# Patient Record
Sex: MALE | Race: WHITE | NOT HISPANIC OR LATINO | Employment: FULL TIME | URBAN - METROPOLITAN AREA
[De-identification: names, ages, dates, MRNs, and addresses within clinical notes are randomized per-mention and may not be internally consistent; named-entity substitution may affect disease eponyms.]

---

## 2017-01-30 RX ORDER — BUMETANIDE 1 MG/1
1 TABLET ORAL DAILY
Qty: 30 TABLET | Refills: 3 | Status: SHIPPED | OUTPATIENT
Start: 2017-01-30 | End: 2018-02-01 | Stop reason: ALTCHOICE

## 2017-05-24 ENCOUNTER — APPOINTMENT (OUTPATIENT)
Dept: CARDIOLOGY | Facility: HOSPITAL | Age: 52
End: 2017-05-24
Attending: INTERNAL MEDICINE

## 2017-06-28 ENCOUNTER — APPOINTMENT (OUTPATIENT)
Dept: CARDIOLOGY | Facility: HOSPITAL | Age: 52
End: 2017-06-28
Attending: INTERNAL MEDICINE

## 2018-02-01 ENCOUNTER — ANTICOAGULATION VISIT (OUTPATIENT)
Dept: CARDIOLOGY | Facility: CLINIC | Age: 53
End: 2018-02-01

## 2018-02-01 ENCOUNTER — OFFICE VISIT (OUTPATIENT)
Dept: CARDIOLOGY | Facility: CLINIC | Age: 53
End: 2018-02-01

## 2018-02-01 ENCOUNTER — HOSPITAL ENCOUNTER (OUTPATIENT)
Dept: CARDIOLOGY | Facility: HOSPITAL | Age: 53
Discharge: HOME | End: 2018-02-01
Admitting: INTERNAL MEDICINE

## 2018-02-01 VITALS
WEIGHT: 315 LBS | BODY MASS INDEX: 40.05 KG/M2 | HEART RATE: 59 BPM | DIASTOLIC BLOOD PRESSURE: 83 MMHG | SYSTOLIC BLOOD PRESSURE: 120 MMHG

## 2018-02-01 DIAGNOSIS — E78.5 HYPERLIPIDEMIA, UNSPECIFIED HYPERLIPIDEMIA TYPE: ICD-10-CM

## 2018-02-01 DIAGNOSIS — I50.9 CONGESTIVE HEART FAILURE, UNSPECIFIED CONGESTIVE HEART FAILURE CHRONICITY, UNSPECIFIED CONGESTIVE HEART FAILURE TYPE: ICD-10-CM

## 2018-02-01 DIAGNOSIS — I48.0 PAROXYSMAL ATRIAL FIBRILLATION (HCC): Primary | ICD-10-CM

## 2018-02-01 LAB — INR PPP: 1.6

## 2018-02-01 PROCEDURE — 93000 ELECTROCARDIOGRAM COMPLETE: CPT | Performed by: INTERNAL MEDICINE

## 2018-02-01 PROCEDURE — 85610 PROTHROMBIN TIME: CPT | Performed by: INTERNAL MEDICINE

## 2018-02-01 PROCEDURE — 99213 OFFICE O/P EST LOW 20 MIN: CPT | Performed by: INTERNAL MEDICINE

## 2018-02-01 NOTE — PROGRESS NOTES
Subjective:       Marcus Simon is a 52 y.o. male who here for follow up    CC  Follow-up for atrial Coblation congestive heart failure and hyperlipidemia  HPI  52-year-old male with known history of paroxysmal atrial fibrillation, congestive heart failure hyperlipidemia here for the follow-up with no complaints of chest pains or tightness in chest no heaviness with a pressure sensation     Problem List Items Addressed This Visit        Cardiovascular and Mediastinum    Paroxysmal atrial fibrillation - Primary    Relevant Orders    ECG 12 Lead    Adult Transthoracic Echo Complete W/ Cont if Necessary Per Protocol    Protime-INR    Congestive heart failure    Relevant Orders    Adult Transthoracic Echo Complete W/ Cont if Necessary Per Protocol    Protime-INR    Hyperlipidemia    Relevant Orders    Adult Transthoracic Echo Complete W/ Cont if Necessary Per Protocol    Protime-INR        .    The following portions of the patient's history were reviewed and updated as appropriate: allergies, current medications, past family history, past medical history, past social history, past surgical history and problem list.    Past Medical History:   Diagnosis Date   • Bulging disc    • CAD (coronary artery disease)    • CHF (congestive heart failure)    • DDD (degenerative disc disease)    • HLD (hyperlipidemia)    • HTN (hypertension)    • Hypothyroidism    • MI (myocardial infarction)    • Osteoarthritis    • PAF (paroxysmal atrial fibrillation)    • Peptic ulcer    • SOB (shortness of breath)    • Transient cerebral ischemia     reports that he has never smoked. He has never used smokeless tobacco. He reports that he does not drink alcohol or use illicit drugs.  Family History   Problem Relation Age of Onset   • Heart attack Mother    • No Known Problems Father    • No Known Problems Sister    • No Known Problems Brother    • No Known Problems Maternal Aunt    • No Known Problems Maternal Uncle    • No Known Problems  Paternal Aunt    • No Known Problems Paternal Uncle    • No Known Problems Maternal Grandmother    • No Known Problems Maternal Grandfather    • No Known Problems Paternal Grandmother    • No Known Problems Paternal Grandfather        Review of Systems  Constitutional: No wt loss, fever, fatigue  Gastrointestinal: No nausea, abdominal pain  Behavioral/Psych: No insomnia or anxiety   Cardiovascular No chest pains or tightness in chest  Objective:       Physical Exam           Physical Exam  /83  Pulse 59  Wt (!) 149 kg (329 lb)  BMI 40.05 kg/m2    General appearance: NAD, conversant   Eyes: anicteric sclerae, moist conjunctivae; no lid-lag; PERRLA   HENT: Atraumatic; oropharynx clear with moist mucous membranes and no mucosal ulcerations;  normal hard and soft palate   Neck: Trachea midline; FROM, supple, no thyromegaly or lymphadenopathy   Lungs: CTA, with normal respiratory effort and no intercostal retractions   CV: S1-S2 regular, no murmurs, no rub, no gallop   Abdomen: Soft, non-tender; no masses or HSM   Extremities: No peripheral edema or extremity lymphadenopathy  Skin: Normal temperature, turgor and texture; no rash, ulcers or subcutaneous nodules   Psych: Appropriate affect, alert and oriented to person, place and time           Cardiographics  @  ECG 12 Lead  Date/Time: 2/1/2018 1:03 PM  Performed by: GONZALES ALONSO  Authorized by: GONZALES ALONSO   Comparison: compared with previous ECG   Similar to previous ECG  Rhythm: atrial fibrillation  ST Flattening: all  Clinical impression: abnormal ECG            Echocardiogram:        Current Outpatient Prescriptions:   •  amLODIPine-benazepril (LOTREL) 10-20 MG per capsule, Take 1 capsule by mouth Daily., Disp: , Rfl:   •  baclofen (LIORESAL) 10 MG tablet, Take 10 mg by mouth 3 (Three) Times a Day., Disp: , Rfl:   •  colchicine 0.6 MG tablet, Take 0.6 mg by mouth Daily., Disp: , Rfl:   •  Furosemide (LASIX PO), Take  by mouth., Disp: ,  Rfl:   •  levothyroxine (SYNTHROID, LEVOTHROID) 150 MCG tablet, Take 150 mcg by mouth Daily., Disp: , Rfl:   •  metoprolol succinate XL (TOPROL-XL) 100 MG 24 hr tablet, Take 100 mg by mouth Daily., Disp: , Rfl:   •  sildenafil (VIAGRA) 100 MG tablet, Take 100 mg by mouth As Needed for erectile dysfunction., Disp: , Rfl:   •  warfarin (COUMADIN) 5 MG tablet, Take 5 mg by mouth Daily., Disp: , Rfl:    Assessment:        Patient Active Problem List   Diagnosis   • Dyspnea on exertion   • Paroxysmal atrial fibrillation   • Congestive heart failure   • Cardiomyopathy   • Hypertension   • Spinal stenosis of lumbar region   • Morbid (severe) obesity due to excess calories   • Old myocardial infarction   • Osteoarthritis of lumbar spine with myelopathy   • Hypothyroidism   • Hyperlipidemia   • Chronic atrial fibrillation   • Long term current use of anticoagulant   • Coronary artery disease involving native coronary artery of native heart without angina pectoris               Plan:            ICD-10-CM ICD-9-CM   1. Paroxysmal atrial fibrillation I48.0 427.31   2. Hyperlipidemia, unspecified hyperlipidemia type E78.5 272.4   3. Congestive heart failure, unspecified congestive heart failure chronicity, unspecified congestive heart failure type I50.9 428.0     1. Paroxysmal atrial fibrillation  Considering patient's medical condition as well as the risk factors, patient will require echocardiogram for further evaluation for the LV function, four-chamber evaluation, including the pressures, valvular function and  pericardial disease and pericardial effusion    - ECG 12 Lead  - Adult Transthoracic Echo Complete W/ Cont if Necessary Per Protocol; Future  - Protime-INR    2. Hyperlipidemia, unspecified hyperlipidemia type  Risk of the hyperlipidemia, importance of the treatment has been explained    Pros and cons of the statins has been explained    Regular blood workup as well as side effects including the liver failure,  myelopathy death has been explained        - Adult Transthoracic Echo Complete W/ Cont if Necessary Per Protocol; Future  - Protime-INR    3. Congestive heart failure, unspecified congestive heart failure chronicity, unspecified congestive heart failure type  Compensated  - Adult Transthoracic Echo Complete W/ Cont if Necessary Per Protocol; Future  - Protime-INR  1 YR WITH ECHO  COUNSELING:    Marcus Browneling was given to patient for the following topics: diagnostic results, risk factor reductions, impressions, risks and benefits of treatment options and importance of treatment compliance .       SMOKING COUNSELING:    Counseling given: Not Answered      EMR Dragon/Transcription disclaimer:   Much of this encounter note is an electronic transcription/translation of spoken language to printed text. The electronic translation of spoken language may permit erroneous, or at times, nonsensical words or phrases to be inadvertently transcribed; Although I have reviewed the note for such errors, some may still exist.

## 2018-11-02 DIAGNOSIS — I50.9 CONGESTIVE HEART FAILURE, UNSPECIFIED HF CHRONICITY, UNSPECIFIED HEART FAILURE TYPE (HCC): ICD-10-CM

## 2018-11-02 DIAGNOSIS — I48.91 ATRIAL FIBRILLATION, UNSPECIFIED TYPE (HCC): Primary | ICD-10-CM

## 2019-02-07 ENCOUNTER — HOSPITAL ENCOUNTER (OUTPATIENT)
Dept: CARDIOLOGY | Facility: HOSPITAL | Age: 54
Discharge: HOME | End: 2019-02-07
Attending: INTERNAL MEDICINE | Admitting: INTERNAL MEDICINE

## 2019-02-07 ENCOUNTER — OFFICE VISIT (OUTPATIENT)
Dept: CARDIOLOGY | Facility: CLINIC | Age: 54
End: 2019-02-07

## 2019-02-07 VITALS
BODY MASS INDEX: 38.36 KG/M2 | WEIGHT: 315 LBS | HEIGHT: 76 IN | SYSTOLIC BLOOD PRESSURE: 146 MMHG | HEART RATE: 91 BPM | DIASTOLIC BLOOD PRESSURE: 89 MMHG

## 2019-02-07 VITALS
WEIGHT: 315 LBS | BODY MASS INDEX: 38.36 KG/M2 | HEIGHT: 76 IN | HEART RATE: 84 BPM | SYSTOLIC BLOOD PRESSURE: 187 MMHG | DIASTOLIC BLOOD PRESSURE: 80 MMHG

## 2019-02-07 DIAGNOSIS — I25.10 CORONARY ARTERY DISEASE INVOLVING NATIVE CORONARY ARTERY OF NATIVE HEART WITHOUT ANGINA PECTORIS: ICD-10-CM

## 2019-02-07 DIAGNOSIS — I48.0 PAROXYSMAL ATRIAL FIBRILLATION (HCC): Primary | ICD-10-CM

## 2019-02-07 DIAGNOSIS — I10 ESSENTIAL HYPERTENSION: ICD-10-CM

## 2019-02-07 LAB
BH CV ECHO MEAS - ACS: 2.4 CM
BH CV ECHO MEAS - AO MAX PG (FULL): 2.3 MMHG
BH CV ECHO MEAS - AO MAX PG: 5 MMHG
BH CV ECHO MEAS - AO MEAN PG (FULL): 1 MMHG
BH CV ECHO MEAS - AO MEAN PG: 3 MMHG
BH CV ECHO MEAS - AO ROOT AREA (BSA CORRECTED): 1.2
BH CV ECHO MEAS - AO ROOT AREA: 8 CM^2
BH CV ECHO MEAS - AO ROOT DIAM: 3.2 CM
BH CV ECHO MEAS - AO V2 MAX: 112 CM/SEC
BH CV ECHO MEAS - AO V2 MEAN: 81.4 CM/SEC
BH CV ECHO MEAS - AO V2 VTI: 19 CM
BH CV ECHO MEAS - AVA(I,A): 3.9 CM^2
BH CV ECHO MEAS - AVA(I,D): 3.9 CM^2
BH CV ECHO MEAS - AVA(V,A): 3.6 CM^2
BH CV ECHO MEAS - AVA(V,D): 3.6 CM^2
BH CV ECHO MEAS - BSA(HAYCOCK): 2.9 M^2
BH CV ECHO MEAS - BSA: 2.7 M^2
BH CV ECHO MEAS - BZI_BMI: 40 KILOGRAMS/M^2
BH CV ECHO MEAS - BZI_METRIC_HEIGHT: 193 CM
BH CV ECHO MEAS - BZI_METRIC_WEIGHT: 149.2 KG
BH CV ECHO MEAS - EDV(CUBED): 117.6 ML
BH CV ECHO MEAS - EDV(MOD-SP2): 144 ML
BH CV ECHO MEAS - EDV(MOD-SP4): 173 ML
BH CV ECHO MEAS - EDV(TEICH): 112.8 ML
BH CV ECHO MEAS - EF(CUBED): 56.9 %
BH CV ECHO MEAS - EF(MOD-BP): 40 %
BH CV ECHO MEAS - EF(MOD-SP2): 48.6 %
BH CV ECHO MEAS - EF(MOD-SP4): 45.1 %
BH CV ECHO MEAS - EF(TEICH): 48.5 %
BH CV ECHO MEAS - ESV(CUBED): 50.7 ML
BH CV ECHO MEAS - ESV(MOD-SP2): 74 ML
BH CV ECHO MEAS - ESV(MOD-SP4): 95 ML
BH CV ECHO MEAS - ESV(TEICH): 58.1 ML
BH CV ECHO MEAS - FS: 24.5 %
BH CV ECHO MEAS - IVS/LVPW: 0.79
BH CV ECHO MEAS - IVSD: 1.1 CM
BH CV ECHO MEAS - LA DIMENSION: 5.6 CM
BH CV ECHO MEAS - LA/AO: 1.8
BH CV ECHO MEAS - LAT PEAK E' VEL: 16 CM/SEC
BH CV ECHO MEAS - LV DIASTOLIC VOL/BSA (35-75): 63.2 ML/M^2
BH CV ECHO MEAS - LV MASS(C)D: 239.9 GRAMS
BH CV ECHO MEAS - LV MASS(C)DI: 87.6 GRAMS/M^2
BH CV ECHO MEAS - LV MAX PG: 2.7 MMHG
BH CV ECHO MEAS - LV MEAN PG: 2 MMHG
BH CV ECHO MEAS - LV SYSTOLIC VOL/BSA (12-30): 34.7 ML/M^2
BH CV ECHO MEAS - LV V1 MAX: 82.8 CM/SEC
BH CV ECHO MEAS - LV V1 MEAN: 56.3 CM/SEC
BH CV ECHO MEAS - LV V1 VTI: 15 CM
BH CV ECHO MEAS - LVIDD: 4.9 CM
BH CV ECHO MEAS - LVIDS: 3.7 CM
BH CV ECHO MEAS - LVLD AP2: 8.9 CM
BH CV ECHO MEAS - LVLD AP4: 9.4 CM
BH CV ECHO MEAS - LVLS AP2: 7.7 CM
BH CV ECHO MEAS - LVLS AP4: 8.4 CM
BH CV ECHO MEAS - LVOT AREA (M): 4.9 CM^2
BH CV ECHO MEAS - LVOT AREA: 4.9 CM^2
BH CV ECHO MEAS - LVOT DIAM: 2.5 CM
BH CV ECHO MEAS - LVPWD: 1.4 CM
BH CV ECHO MEAS - MED PEAK E' VEL: 8.4 CM/SEC
BH CV ECHO MEAS - MR MAX PG: 55.1 MMHG
BH CV ECHO MEAS - MR MAX VEL: 371 CM/SEC
BH CV ECHO MEAS - MV DEC SLOPE: 522 CM/SEC^2
BH CV ECHO MEAS - MV DEC TIME: 0.17 SEC
BH CV ECHO MEAS - MV E MAX VEL: 89.2 CM/SEC
BH CV ECHO MEAS - MV MAX PG: 5.6 MMHG
BH CV ECHO MEAS - MV MEAN PG: 2 MMHG
BH CV ECHO MEAS - MV P1/2T MAX VEL: 113 CM/SEC
BH CV ECHO MEAS - MV P1/2T: 63.4 MSEC
BH CV ECHO MEAS - MV V2 MAX: 118 CM/SEC
BH CV ECHO MEAS - MV V2 MEAN: 56.2 CM/SEC
BH CV ECHO MEAS - MV V2 VTI: 27.4 CM
BH CV ECHO MEAS - MVA P1/2T LCG: 1.9 CM^2
BH CV ECHO MEAS - MVA(P1/2T): 3.5 CM^2
BH CV ECHO MEAS - MVA(VTI): 2.7 CM^2
BH CV ECHO MEAS - PA ACC TIME: 0.09 SEC
BH CV ECHO MEAS - PA MAX PG (FULL): 1.2 MMHG
BH CV ECHO MEAS - PA MAX PG: 2.8 MMHG
BH CV ECHO MEAS - PA PR(ACCEL): 37.6 MMHG
BH CV ECHO MEAS - PA V2 MAX: 83.3 CM/SEC
BH CV ECHO MEAS - PVA(V,A): 3.2 CM^2
BH CV ECHO MEAS - PVA(V,D): 3.2 CM^2
BH CV ECHO MEAS - QP/QS: 0.77
BH CV ECHO MEAS - RAP SYSTOLE: 15 MMHG
BH CV ECHO MEAS - RV MAX PG: 1.6 MMHG
BH CV ECHO MEAS - RV MEAN PG: 1 MMHG
BH CV ECHO MEAS - RV V1 MAX: 63.5 CM/SEC
BH CV ECHO MEAS - RV V1 MEAN: 43 CM/SEC
BH CV ECHO MEAS - RV V1 VTI: 13.7 CM
BH CV ECHO MEAS - RVDD: 2.8 CM
BH CV ECHO MEAS - RVOT AREA: 4.2 CM^2
BH CV ECHO MEAS - RVOT DIAM: 2.3 CM
BH CV ECHO MEAS - RVSP: 40 MMHG
BH CV ECHO MEAS - SI(AO): 55.8 ML/M^2
BH CV ECHO MEAS - SI(CUBED): 24.5 ML/M^2
BH CV ECHO MEAS - SI(LVOT): 26.9 ML/M^2
BH CV ECHO MEAS - SI(MOD-SP2): 25.6 ML/M^2
BH CV ECHO MEAS - SI(MOD-SP4): 28.5 ML/M^2
BH CV ECHO MEAS - SI(TEICH): 20 ML/M^2
BH CV ECHO MEAS - SV(AO): 152.8 ML
BH CV ECHO MEAS - SV(CUBED): 67 ML
BH CV ECHO MEAS - SV(LVOT): 73.6 ML
BH CV ECHO MEAS - SV(MOD-SP2): 70 ML
BH CV ECHO MEAS - SV(MOD-SP4): 78 ML
BH CV ECHO MEAS - SV(RVOT): 56.9 ML
BH CV ECHO MEAS - SV(TEICH): 54.7 ML
BH CV ECHO MEAS - TAPSE (>1.6): 1.5 CM2
BH CV ECHO MEAS - TR MAX VEL: 250 CM/SEC
BH CV ECHO MEASUREMENTS AVERAGE E/E' RATIO: 7.31
BH CV XLRA - RV BASE: 3.9 CM
BH CV XLRA - RV LENGTH: 8 CM
BH CV XLRA - RV MID: 3.7 CM
BH CV XLRA - TDI S': 9.7 CM/SEC
LEFT ATRIUM VOLUME INDEX: 36 ML/M2
MAXIMAL PREDICTED HEART RATE: 168 BPM
STRESS TARGET HR: 143 BPM

## 2019-02-07 PROCEDURE — 93306 TTE W/DOPPLER COMPLETE: CPT

## 2019-02-07 PROCEDURE — 93306 TTE W/DOPPLER COMPLETE: CPT | Performed by: INTERNAL MEDICINE

## 2019-02-07 PROCEDURE — 99214 OFFICE O/P EST MOD 30 MIN: CPT | Performed by: INTERNAL MEDICINE

## 2019-02-07 PROCEDURE — 25010000002 PERFLUTREN (DEFINITY) 8.476 MG IN SODIUM CHLORIDE 0.9 % 10 ML INJECTION: Performed by: INTERNAL MEDICINE

## 2019-02-07 PROCEDURE — 93000 ELECTROCARDIOGRAM COMPLETE: CPT | Performed by: INTERNAL MEDICINE

## 2019-02-07 RX ORDER — FUROSEMIDE 20 MG/1
10 TABLET ORAL DAILY
Qty: 30 TABLET | Refills: 6 | Status: SHIPPED | OUTPATIENT
Start: 2019-02-07

## 2019-02-07 RX ADMIN — SODIUM CHLORIDE 2.5 ML: 9 INJECTION INTRAMUSCULAR; INTRAVENOUS; SUBCUTANEOUS at 11:39

## 2019-02-07 NOTE — PROGRESS NOTES
Subjective:        Marcus Simon is a 53 y.o. male who here for follow up    CC  INCREASE WT GAIN    LEG SWELLING  HPI  53-year-old male with known history of the paroxysmal atrial fibrillation, benign essential arterial hypertension and coronary artery disease here for the follow-up has been complaining of increasing weight gain as well as increasing leg swellings     Problem List Items Addressed This Visit        Cardiovascular and Mediastinum    Paroxysmal atrial fibrillation (CMS/HCC) - Primary    Relevant Orders    ECG 12 Lead    Hypertension    Relevant Medications    furosemide (LASIX) 20 MG tablet    Coronary artery disease involving native coronary artery of native heart without angina pectoris        .    The following portions of the patient's history were reviewed and updated as appropriate: allergies, current medications, past family history, past medical history, past social history, past surgical history and problem list.    Past Medical History:   Diagnosis Date   • Bulging disc    • CAD (coronary artery disease)    • CHF (congestive heart failure) (CMS/HCC)    • DDD (degenerative disc disease)    • HLD (hyperlipidemia)    • HTN (hypertension)    • Hypothyroidism    • MI (myocardial infarction) (CMS/HCC)    • Osteoarthritis    • PAF (paroxysmal atrial fibrillation) (CMS/HCC)    • Peptic ulcer    • SOB (shortness of breath)    • Transient cerebral ischemia      reports that  has never smoked. he has never used smokeless tobacco. He reports that he does not drink alcohol or use drugs.   Family History   Problem Relation Age of Onset   • Heart attack Mother    • No Known Problems Father    • No Known Problems Sister    • No Known Problems Brother    • No Known Problems Maternal Aunt    • No Known Problems Maternal Uncle    • No Known Problems Paternal Aunt    • No Known Problems Paternal Uncle    • No Known Problems Maternal Grandmother    • No Known Problems Maternal Grandfather    • No Known  "Problems Paternal Grandmother    • No Known Problems Paternal Grandfather        Review of Systems  Constitutional: No wt loss, fever, fatigue  Gastrointestinal: No nausea, abdominal pain  Behavioral/Psych: No insomnia or anxiety   Cardiovascular increasing weight gain and swelling  Objective:   .BP (!) 187/80   Pulse 84   Ht 193 cm (76\")   Wt (!) 166 kg (366 lb)   BMI 44.55 kg/m²   General appearance: No acute changes   Neck: Trachea midline; NECK, supple, no thyromegaly or lymphadenopathy   Lungs: Normal size and shape, normal breath sounds, equal distribution of air, no rales and rhonchi   CV: S1-S2 regular, no murmurs, no rub, no gallop   Abdomen: Soft, non-tender; no masses , no abnormal abdominal sounds   Extremities: No deformity , normal color , 2+ peripheral edema   Skin: Normal temperature, turgor and texture; no rash, ulcers            ECG 12 Lead  Date/Time: 2/7/2019 12:46 PM  Performed by: Ron Cowan MD  Authorized by: Ron Cowan MD   Comparison: compared with previous ECG   Similar to previous ECG  Rhythm: atrial fibrillation  ST Flattening: all  Clinical impression: abnormal ECG              Echocardiogram:        Current Outpatient Medications:   •  amLODIPine-benazepril (LOTREL) 10-20 MG per capsule, Take 1 capsule by mouth Daily., Disp: , Rfl:   •  baclofen (LIORESAL) 10 MG tablet, Take 10 mg by mouth 3 (Three) Times a Day., Disp: , Rfl:   •  colchicine 0.6 MG tablet, Take 0.6 mg by mouth Daily., Disp: , Rfl:   •  Furosemide (LASIX PO), Take 10 mg by mouth Daily., Disp: , Rfl:   •  levothyroxine (SYNTHROID, LEVOTHROID) 150 MCG tablet, Take 150 mcg by mouth Daily., Disp: , Rfl:   •  metoprolol succinate XL (TOPROL-XL) 100 MG 24 hr tablet, Take 100 mg by mouth Daily., Disp: , Rfl:   •  warfarin (COUMADIN) 5 MG tablet, Take 5 mg by mouth Daily., Disp: , Rfl:   •  sildenafil (VIAGRA) 100 MG tablet, Take 100 mg by mouth As Needed for erectile dysfunction., Disp: , Rfl:   No " current facility-administered medications for this visit.    Assessment:        Patient Active Problem List   Diagnosis   • Dyspnea on exertion   • Paroxysmal atrial fibrillation (CMS/HCC)   • Congestive heart failure (CMS/HCC)   • Cardiomyopathy (CMS/HCC)   • Hypertension   • Spinal stenosis of lumbar region   • Morbid (severe) obesity due to excess calories (CMS/HCC)   • Old myocardial infarction   • Osteoarthritis of lumbar spine with myelopathy   • Hypothyroidism   • Hyperlipidemia   • Chronic atrial fibrillation (CMS/HCC)   • Long term current use of anticoagulant   • Coronary artery disease involving native coronary artery of native heart without angina pectoris               Plan:            ICD-10-CM ICD-9-CM   1. Paroxysmal atrial fibrillation (CMS/HCC) I48.0 427.31   2. Coronary artery disease involving native coronary artery of native heart without angina pectoris I25.10 414.01   3. Essential hypertension I10 401.9     1. Paroxysmal atrial fibrillation (CMS/HCC)  Atrial fibrillation rate controlled  - ECG 12 Lead    2. Coronary artery disease involving native coronary artery of native heart without angina pectoris  No chest pain    3. Essential hypertension  Blood pressure controlled       INCREASE 20 MG  LASIX    Pros and cons of this new medication / change medication has been explained to  the patient    Possible side effects has been explained    Associated need of the blood  Work has been explained    Need for the compliance of the medication has been explained      SEE IN 1 MONTH      COUNSELING:    Marcus Beverly was given to patient for the following topics: diagnostic results, risk factor reductions, impressions, risks and benefits of treatment options and importance of treatment compliance .       SMOKING COUNSELING:    Counseling given: Not Answered      Dictated using Dragon dictation

## 2019-03-07 ENCOUNTER — OFFICE VISIT (OUTPATIENT)
Dept: CARDIOLOGY | Facility: CLINIC | Age: 54
End: 2019-03-07

## 2019-03-07 VITALS
HEART RATE: 79 BPM | DIASTOLIC BLOOD PRESSURE: 85 MMHG | SYSTOLIC BLOOD PRESSURE: 136 MMHG | HEIGHT: 76 IN | BODY MASS INDEX: 38.36 KG/M2 | WEIGHT: 315 LBS

## 2019-03-07 DIAGNOSIS — E78.5 HYPERLIPIDEMIA, UNSPECIFIED HYPERLIPIDEMIA TYPE: ICD-10-CM

## 2019-03-07 DIAGNOSIS — I25.10 CORONARY ARTERY DISEASE INVOLVING NATIVE CORONARY ARTERY OF NATIVE HEART WITHOUT ANGINA PECTORIS: ICD-10-CM

## 2019-03-07 DIAGNOSIS — I10 ESSENTIAL HYPERTENSION: ICD-10-CM

## 2019-03-07 DIAGNOSIS — I48.0 PAROXYSMAL ATRIAL FIBRILLATION (HCC): Primary | ICD-10-CM

## 2019-03-07 DIAGNOSIS — I42.0 DILATED CARDIOMYOPATHY (HCC): ICD-10-CM

## 2019-03-07 PROCEDURE — 99213 OFFICE O/P EST LOW 20 MIN: CPT | Performed by: INTERNAL MEDICINE

## 2019-03-07 NOTE — PROGRESS NOTES
Subjective:        Marcus Simon is a 54 y.o. male who here for follow up    CC  FOLLOW UP AFTER INCREASE LASIX  HPI  54-year-old male with known history of coronary artery disease, benign essential arterial hypertension, hyperlipidemia and paroxysmal atrial fibrillation here for the follow-up after Lasix was increased for shortness of breath and bilateral leg swelling 1 month ago     Problem List Items Addressed This Visit        Cardiovascular and Mediastinum    Paroxysmal atrial fibrillation (CMS/HCC) - Primary    Hypertension    Hyperlipidemia    Coronary artery disease involving native coronary artery of native heart without angina pectoris        .    The following portions of the patient's history were reviewed and updated as appropriate: allergies, current medications, past family history, past medical history, past social history, past surgical history and problem list.    Past Medical History:   Diagnosis Date   • Bulging disc    • CAD (coronary artery disease)    • CHF (congestive heart failure) (CMS/HCC)    • DDD (degenerative disc disease)    • HLD (hyperlipidemia)    • HTN (hypertension)    • Hypothyroidism    • MI (myocardial infarction) (CMS/HCC)    • Osteoarthritis    • PAF (paroxysmal atrial fibrillation) (CMS/HCC)    • Peptic ulcer    • SOB (shortness of breath)    • Transient cerebral ischemia      reports that  has never smoked. he has never used smokeless tobacco. He reports that he does not drink alcohol or use drugs.   Family History   Problem Relation Age of Onset   • Heart attack Mother    • No Known Problems Father    • No Known Problems Sister    • No Known Problems Brother    • No Known Problems Maternal Aunt    • No Known Problems Maternal Uncle    • No Known Problems Paternal Aunt    • No Known Problems Paternal Uncle    • No Known Problems Maternal Grandmother    • No Known Problems Maternal Grandfather    • No Known Problems Paternal Grandmother    • No Known Problems Paternal  "Grandfather        Review of Systems  Constitutional: No wt loss, fever, fatigue  Gastrointestinal: No nausea, abdominal pain  Behavioral/Psych: No insomnia or anxiety   Cardiovascular swelling much better  Objective:       Physical Exam  /85   Pulse 79   Ht 193 cm (76\")   Wt (!) 160 kg (353 lb)   BMI 42.97 kg/m²   General appearance: No acute changes   Neck: Trachea midline; NECK, supple, no thyromegaly or lymphadenopathy   Lungs: Normal size and shape, normal breath sounds, equal distribution of air, no rales and rhonchi   CV: S1-S2 regular, no murmurs, no rub, no gallop   Abdomen: Soft, non-tender; no masses , no abnormal abdominal sounds   Extremities: No deformity , normal color , trace peripheral edema   Skin: Normal temperature, turgor and texture; no rash, ulcers          Procedures      Echocardiogram:        Current Outpatient Medications:   •  amLODIPine-benazepril (LOTREL) 10-20 MG per capsule, Take 1 capsule by mouth Daily., Disp: , Rfl:   •  baclofen (LIORESAL) 10 MG tablet, Take 10 mg by mouth 3 (Three) Times a Day., Disp: , Rfl:   •  colchicine 0.6 MG tablet, Take 0.6 mg by mouth Daily., Disp: , Rfl:   •  furosemide (LASIX) 20 MG tablet, Take 0.5 tablets by mouth Daily. (Patient taking differently: Take 20 mg by mouth Daily. PT STATES HAD BEEN TAKING 20 MG DAILY, INCREASED TO 40 MG FOR THE PAST MONTH.), Disp: 30 tablet, Rfl: 6  •  levothyroxine (SYNTHROID, LEVOTHROID) 150 MCG tablet, Take 150 mcg by mouth Daily., Disp: , Rfl:   •  metoprolol succinate XL (TOPROL-XL) 100 MG 24 hr tablet, Take 100 mg by mouth Daily., Disp: , Rfl:   •  sildenafil (VIAGRA) 100 MG tablet, Take 100 mg by mouth As Needed for erectile dysfunction., Disp: , Rfl:   •  warfarin (COUMADIN) 5 MG tablet, Take 5 mg by mouth Daily., Disp: , Rfl:    Assessment:        Patient Active Problem List   Diagnosis   • Dyspnea on exertion   • Paroxysmal atrial fibrillation (CMS/HCC)   • Congestive heart failure (CMS/HCC)   • " Cardiomyopathy (CMS/HCC)   • Hypertension   • Spinal stenosis of lumbar region   • Morbid (severe) obesity due to excess calories (CMS/HCC)   • Old myocardial infarction   • Osteoarthritis of lumbar spine with myelopathy   • Hypothyroidism   • Hyperlipidemia   • Chronic atrial fibrillation (CMS/HCC)   • Long term current use of anticoagulant   • Coronary artery disease involving native coronary artery of native heart without angina pectoris               Plan:            ICD-10-CM ICD-9-CM   1. Paroxysmal atrial fibrillation (CMS/HCC) I48.0 427.31   2. Essential hypertension I10 401.9   3. Hyperlipidemia, unspecified hyperlipidemia type E78.5 272.4   4. Coronary artery disease involving native coronary artery of native heart without angina pectoris I25.10 414.01     1. Paroxysmal atrial fibrillation (CMS/HCC)  Controlled    2. Essential hypertension  Blood pressure controlled    3. Hyperlipidemia, unspecified hyperlipidemia type  Counseling done    4. Coronary artery disease involving native coronary artery of native heart without angina pectoris  No angina pectoris    5. Dilated cardiomyopathy (CMS/HCC)  Compensated    MUCH BETTER after starting the Lasix    SEE IN 6 MONTHS    COUNSELING:    Marcus Beverly was given to patient for the following topics: diagnostic results, risk factor reductions, impressions, risks and benefits of treatment options and importance of treatment compliance .       SMOKING COUNSELING:    Counseling given: Not Answered      Dictated using Dragon dictation

## 2021-03-11 ENCOUNTER — OFFICE VISIT (OUTPATIENT)
Dept: CARDIOLOGY | Facility: CLINIC | Age: 56
End: 2021-03-11

## 2021-03-11 VITALS
SYSTOLIC BLOOD PRESSURE: 131 MMHG | DIASTOLIC BLOOD PRESSURE: 85 MMHG | HEART RATE: 94 BPM | HEIGHT: 76 IN | WEIGHT: 315 LBS | BODY MASS INDEX: 38.36 KG/M2

## 2021-03-11 DIAGNOSIS — Z79.01 LONG TERM CURRENT USE OF ANTICOAGULANT: ICD-10-CM

## 2021-03-11 DIAGNOSIS — E78.5 HYPERLIPIDEMIA, UNSPECIFIED HYPERLIPIDEMIA TYPE: ICD-10-CM

## 2021-03-11 DIAGNOSIS — I50.9 CONGESTIVE HEART FAILURE, UNSPECIFIED HF CHRONICITY, UNSPECIFIED HEART FAILURE TYPE (HCC): ICD-10-CM

## 2021-03-11 DIAGNOSIS — I48.21 PERMANENT ATRIAL FIBRILLATION (HCC): Primary | ICD-10-CM

## 2021-03-11 DIAGNOSIS — I10 ESSENTIAL HYPERTENSION: ICD-10-CM

## 2021-03-11 PROCEDURE — 99213 OFFICE O/P EST LOW 20 MIN: CPT | Performed by: INTERNAL MEDICINE

## 2021-03-11 PROCEDURE — 93000 ELECTROCARDIOGRAM COMPLETE: CPT | Performed by: INTERNAL MEDICINE

## 2021-03-11 RX ORDER — GABAPENTIN 300 MG/1
300 CAPSULE ORAL 2 TIMES DAILY
COMMUNITY
Start: 2021-02-23

## 2021-03-11 RX ORDER — DOXYCYCLINE HYCLATE 50 MG/1
324 CAPSULE, GELATIN COATED ORAL
COMMUNITY
Start: 2021-02-11

## 2021-03-11 RX ORDER — DIGOXIN 125 MCG
125 TABLET ORAL DAILY
Qty: 30 TABLET | Refills: 11 | Status: SHIPPED | OUTPATIENT
Start: 2021-03-11 | End: 2021-03-11 | Stop reason: SDUPTHER

## 2021-03-11 RX ORDER — DIGOXIN 125 MCG
125 TABLET ORAL DAILY
Qty: 30 TABLET | Refills: 11 | Status: ON HOLD | OUTPATIENT
Start: 2021-03-11 | End: 2022-10-19

## 2021-03-11 NOTE — PROGRESS NOTES
Follow up       Leg swelling    Subjective:        Marcus Simon is a 56 y.o. male who here for follow up    CC  afib    Leg swelling  HPI  56-year-old male with history of the hypertension congestive heart failure complaining of the bilateral mild leg swelling     Problems Addressed this Visit        Other    Congestive heart failure (CMS/HCC)    Relevant Orders    Stress Test With Myocardial Perfusion One Day    Adult Transthoracic Echo Complete W/ Cont if Necessary Per Protocol    Hypertension    Relevant Orders    Stress Test With Myocardial Perfusion One Day    Adult Transthoracic Echo Complete W/ Cont if Necessary Per Protocol    Hyperlipidemia    Relevant Orders    Stress Test With Myocardial Perfusion One Day    Adult Transthoracic Echo Complete W/ Cont if Necessary Per Protocol    Long term current use of anticoagulant    Relevant Orders    Stress Test With Myocardial Perfusion One Day    Adult Transthoracic Echo Complete W/ Cont if Necessary Per Protocol    Permanent atrial fibrillation (CMS/HCC) - Primary    Relevant Orders    Stress Test With Myocardial Perfusion One Day    Adult Transthoracic Echo Complete W/ Cont if Necessary Per Protocol      Diagnoses       Codes Comments    Permanent atrial fibrillation (CMS/HCC)    -  Primary ICD-10-CM: I48.21  ICD-9-CM: 427.31     Long term current use of anticoagulant     ICD-10-CM: Z79.01  ICD-9-CM: V58.61     Congestive heart failure, unspecified HF chronicity, unspecified heart failure type (CMS/HCC)     ICD-10-CM: I50.9  ICD-9-CM: 428.0     Essential hypertension     ICD-10-CM: I10  ICD-9-CM: 401.9     Hyperlipidemia, unspecified hyperlipidemia type     ICD-10-CM: E78.5  ICD-9-CM: 272.4         .    The following portions of the patient's history were reviewed and updated as appropriate: allergies, current medications, past family history, past medical history, past social history, past surgical history and problem list.    Past Medical History:   Diagnosis  "Date   • Bulging disc    • CAD (coronary artery disease)    • CHF (congestive heart failure) (CMS/[facility])    • DDD (degenerative disc disease)    • HLD (hyperlipidemia)    • HTN (hypertension)    • Hypothyroidism    • MI (myocardial infarction) (CMS/[facility])    • Osteoarthritis    • PAF (paroxysmal atrial fibrillation) (CMS/[facility])    • Peptic ulcer    • SOB (shortness of breath)    • Transient cerebral ischemia      reports that he has never smoked. He has never used smokeless tobacco. He reports that he does not drink alcohol and does not use drugs.   Family History   Problem Relation Age of Onset   • Heart attack Mother    • No Known Problems Father    • No Known Problems Sister    • No Known Problems Brother    • No Known Problems Maternal Aunt    • No Known Problems Maternal Uncle    • No Known Problems Paternal Aunt    • No Known Problems Paternal Uncle    • No Known Problems Maternal Grandmother    • No Known Problems Maternal Grandfather    • No Known Problems Paternal Grandmother    • No Known Problems Paternal Grandfather        Review of Systems  Constitutional: No wt loss, fever, fatigue  Gastrointestinal: No nausea, abdominal pain  Behavioral/Psych: No insomnia or anxiety   Cardiovascular bilateral leg swelling  Objective:       Physical Exam  /85   Pulse 94   Ht 193 cm (76\")   Wt (!) 161 kg (356 lb)   BMI 43.33 kg/m²   General appearance: No acute changes   Neck: Trachea midline; NECK, supple, no thyromegaly or lymphadenopathy   Lungs: Normal size and shape, normal breath sounds, equal distribution of air, no rales and rhonchi   CV: S1-S2 regular, no murmurs, no rub, no gallop   Abdomen: Soft, non-tender; no masses , no abnormal abdominal sounds   Extremities: No deformity , normal color , 1+ peripheral edema   Skin: Normal temperature, turgor and texture; no rash, ulcers            ECG 12 Lead    Date/Time: 3/11/2021 3:32 PM  Performed by: Ron Cowan MD  Authorized by: Chapo" MD Ron   Comparison: compared with previous ECG   Comparison to previous ECG: afib  Rhythm: atrial fibrillation  ST Flattening: all    Clinical impression: abnormal EKG              Echocardiogram:        Current Outpatient Medications:   •  amLODIPine-benazepril (LOTREL) 10-20 MG per capsule, Take 1 capsule by mouth Daily., Disp: , Rfl:   •  baclofen (LIORESAL) 10 MG tablet, Take 10 mg by mouth 3 (Three) Times a Day., Disp: , Rfl:   •  colchicine 0.6 MG tablet, Take 0.6 mg by mouth Daily., Disp: , Rfl:   •  ferrous gluconate (FERGON) 324 MG tablet, , Disp: , Rfl:   •  furosemide (LASIX) 20 MG tablet, Take 0.5 tablets by mouth Daily. (Patient taking differently: Take 20 mg by mouth Daily. PT STATES HAD BEEN TAKING 20 MG DAILY, INCREASED TO 40 MG FOR THE PAST MONTH.), Disp: 30 tablet, Rfl: 6  •  gabapentin (NEURONTIN) 300 MG capsule, , Disp: , Rfl:   •  levothyroxine (SYNTHROID, LEVOTHROID) 150 MCG tablet, Take 150 mcg by mouth Daily., Disp: , Rfl:   •  metoprolol succinate XL (TOPROL-XL) 100 MG 24 hr tablet, Take 100 mg by mouth Daily., Disp: , Rfl:   •  sildenafil (VIAGRA) 100 MG tablet, Take 100 mg by mouth As Needed for erectile dysfunction., Disp: , Rfl:   •  warfarin (COUMADIN) 5 MG tablet, Take 5 mg by mouth Daily., Disp: , Rfl:    Assessment:        Patient Active Problem List   Diagnosis   • Dyspnea on exertion   • Paroxysmal atrial fibrillation (CMS/HCC)   • Congestive heart failure (CMS/HCC)   • Cardiomyopathy (CMS/HCC)   • Hypertension   • Spinal stenosis of lumbar region   • Morbid (severe) obesity due to excess calories (CMS/HCC)   • Old myocardial infarction   • Osteoarthritis of lumbar spine with myelopathy   • Hypothyroidism   • Hyperlipidemia   • Chronic atrial fibrillation (CMS/HCC)   • Long term current use of anticoagulant   • Coronary artery disease involving native coronary artery of native heart without angina pectoris               Plan:            ICD-10-CM ICD-9-CM   1. Permanent  atrial fibrillation (CMS/HCC)  I48.21 427.31   2. Long term current use of anticoagulant  Z79.01 V58.61   3. Congestive heart failure, unspecified HF chronicity, unspecified heart failure type (CMS/HCC)  I50.9 428.0   4. Essential hypertension  I10 401.9   5. Hyperlipidemia, unspecified hyperlipidemia type  E78.5 272.4     1. Permanent atrial fibrillation (CMS/HCC)  Considering the patient's symptoms as well as clinical situation and  EKG findings, along with cardiac risk factors, ischemic workup is necessary to rule out ischemic cardiomyopathy, stress induced arrhythmias, and functional capacity for diagnosis as well as prognostic consideration    - Stress Test With Myocardial Perfusion One Day  - Adult Transthoracic Echo Complete W/ Cont if Necessary Per Protocol    2. Long term current use of anticoagulant  Continue current treatment  - Stress Test With Myocardial Perfusion One Day  - Adult Transthoracic Echo Complete W/ Cont if Necessary Per Protocol    3. Congestive heart failure, unspecified HF chronicity, unspecified heart failure type (CMS/HCC)  Considering patient's medical condition as well as the risk factors, patient will require echocardiogram for further evaluation for the LV function, four-chamber evaluation, including the pressures, valvular function and  pericardial disease and pericardial effusion    - Stress Test With Myocardial Perfusion One Day  - Adult Transthoracic Echo Complete W/ Cont if Necessary Per Protocol    4. Essential hypertension  Blood pressure controlled  - Stress Test With Myocardial Perfusion One Day  - Adult Transthoracic Echo Complete W/ Cont if Necessary Per Protocol    5. Hyperlipidemia, unspecified hyperlipidemia type  Continue current treatment  - Stress Test With Myocardial Perfusion One Day  - Adult Transthoracic Echo Complete W/ Cont if Necessary Per Protocol       Echo, yfn    May need cath  COUNSELING:    Marcus Beverly was given to patient for the  following topics: diagnostic results, risk factor reductions, impressions, risks and benefits of treatment options and importance of treatment compliance .       SMOKING COUNSELING:    [unfilled]    Dictated using Dragon dictation

## 2021-03-24 ENCOUNTER — BULK ORDERING (OUTPATIENT)
Dept: CASE MANAGEMENT | Facility: OTHER | Age: 56
End: 2021-03-24

## 2021-03-24 DIAGNOSIS — Z23 IMMUNIZATION DUE: ICD-10-CM

## 2021-03-26 ENCOUNTER — HOSPITAL ENCOUNTER (OUTPATIENT)
Dept: CARDIOLOGY | Facility: HOSPITAL | Age: 56
Discharge: HOME | End: 2021-03-26
Admitting: INTERNAL MEDICINE

## 2021-03-26 VITALS
DIASTOLIC BLOOD PRESSURE: 101 MMHG | HEIGHT: 76 IN | SYSTOLIC BLOOD PRESSURE: 141 MMHG | WEIGHT: 315 LBS | BODY MASS INDEX: 38.36 KG/M2

## 2021-03-26 PROCEDURE — 25010000002 PERFLUTREN (DEFINITY) 8.476 MG IN SODIUM CHLORIDE (PF) 0.9 % 10 ML INJECTION: Performed by: INTERNAL MEDICINE

## 2021-03-26 PROCEDURE — 93306 TTE W/DOPPLER COMPLETE: CPT

## 2021-03-26 PROCEDURE — 93306 TTE W/DOPPLER COMPLETE: CPT | Performed by: INTERNAL MEDICINE

## 2021-03-26 RX ADMIN — SODIUM CHLORIDE 2 ML: 9 INJECTION INTRAMUSCULAR; INTRAVENOUS; SUBCUTANEOUS at 13:46

## 2021-03-29 LAB
AORTIC DIMENSIONLESS INDEX: 0.9 (DI)
BH CV ECHO MEAS - ACS: 2.2 CM
BH CV ECHO MEAS - AO MAX PG (FULL): 1 MMHG
BH CV ECHO MEAS - AO MAX PG: 3 MMHG
BH CV ECHO MEAS - AO MEAN PG (FULL): 1 MMHG
BH CV ECHO MEAS - AO MEAN PG: 2 MMHG
BH CV ECHO MEAS - AO ROOT AREA (BSA CORRECTED): 0.95
BH CV ECHO MEAS - AO ROOT AREA: 5.7 CM^2
BH CV ECHO MEAS - AO ROOT DIAM: 2.7 CM
BH CV ECHO MEAS - AO V2 MAX: 87 CM/SEC
BH CV ECHO MEAS - AO V2 MEAN: 67.9 CM/SEC
BH CV ECHO MEAS - AO V2 VTI: 13.8 CM
BH CV ECHO MEAS - AVA(I,A): 2.8 CM^2
BH CV ECHO MEAS - AVA(I,D): 2.8 CM^2
BH CV ECHO MEAS - AVA(V,A): 2.6 CM^2
BH CV ECHO MEAS - AVA(V,D): 2.6 CM^2
BH CV ECHO MEAS - BSA(HAYCOCK): 3 M^2
BH CV ECHO MEAS - BSA: 2.8 M^2
BH CV ECHO MEAS - BZI_BMI: 43.3 KILOGRAMS/M^2
BH CV ECHO MEAS - BZI_METRIC_HEIGHT: 193 CM
BH CV ECHO MEAS - BZI_METRIC_WEIGHT: 161.5 KG
BH CV ECHO MEAS - CONTRAST EF 4CH: 44 CM2
BH CV ECHO MEAS - EDV(CUBED): 140.6 ML
BH CV ECHO MEAS - EDV(MOD-SP2): 102 ML
BH CV ECHO MEAS - EDV(MOD-SP4): 100 ML
BH CV ECHO MEAS - EDV(TEICH): 129.5 ML
BH CV ECHO MEAS - EF(CUBED): 76.7 %
BH CV ECHO MEAS - EF(MOD-BP): 40 %
BH CV ECHO MEAS - EF(MOD-SP2): 48 %
BH CV ECHO MEAS - EF(MOD-SP4): 40 %
BH CV ECHO MEAS - EF(TEICH): 68.4 %
BH CV ECHO MEAS - ESV(CUBED): 32.8 ML
BH CV ECHO MEAS - ESV(MOD-SP2): 53 ML
BH CV ECHO MEAS - ESV(MOD-SP4): 60 ML
BH CV ECHO MEAS - ESV(TEICH): 41 ML
BH CV ECHO MEAS - FS: 38.5 %
BH CV ECHO MEAS - IVS/LVPW: 1
BH CV ECHO MEAS - IVSD: 1.2 CM
BH CV ECHO MEAS - LV DIASTOLIC VOL/BSA (35-75): 35.3 ML/M^2
BH CV ECHO MEAS - LV MASS(C)D: 248.8 GRAMS
BH CV ECHO MEAS - LV MASS(C)DI: 87.9 GRAMS/M^2
BH CV ECHO MEAS - LV MAX PG: 2 MMHG
BH CV ECHO MEAS - LV MEAN PG: 1 MMHG
BH CV ECHO MEAS - LV SYSTOLIC VOL/BSA (12-30): 21.2 ML/M^2
BH CV ECHO MEAS - LV V1 MAX: 71.1 CM/SEC
BH CV ECHO MEAS - LV V1 MEAN: 53.8 CM/SEC
BH CV ECHO MEAS - LV V1 VTI: 12.5 CM
BH CV ECHO MEAS - LVIDD: 5.2 CM
BH CV ECHO MEAS - LVIDS: 3.2 CM
BH CV ECHO MEAS - LVLD AP2: 7.7 CM
BH CV ECHO MEAS - LVLD AP4: 7.6 CM
BH CV ECHO MEAS - LVLS AP2: 7 CM
BH CV ECHO MEAS - LVLS AP4: 7.2 CM
BH CV ECHO MEAS - LVOT AREA (M): 3.1 CM^2
BH CV ECHO MEAS - LVOT AREA: 3.1 CM^2
BH CV ECHO MEAS - LVOT DIAM: 2 CM
BH CV ECHO MEAS - LVPWD: 1.2 CM
BH CV ECHO MEAS - MV DEC SLOPE: 550 CM/SEC^2
BH CV ECHO MEAS - MV DEC TIME: 280 SEC
BH CV ECHO MEAS - MV E MAX VEL: 76.7 CM/SEC
BH CV ECHO MEAS - MV MAX PG: 5.3 MMHG
BH CV ECHO MEAS - MV MEAN PG: 4 MMHG
BH CV ECHO MEAS - MV P1/2T MAX VEL: 107 CM/SEC
BH CV ECHO MEAS - MV P1/2T: 57 MSEC
BH CV ECHO MEAS - MV V2 MAX: 114.5 CM/SEC
BH CV ECHO MEAS - MV V2 MEAN: 89.2 CM/SEC
BH CV ECHO MEAS - MV V2 VTI: 19.9 CM
BH CV ECHO MEAS - MVA P1/2T LCG: 2.1 CM^2
BH CV ECHO MEAS - MVA(P1/2T): 3.9 CM^2
BH CV ECHO MEAS - MVA(VTI): 2 CM^2
BH CV ECHO MEAS - PA ACC TIME: 0.07 SEC
BH CV ECHO MEAS - PA MAX PG (FULL): 1.7 MMHG
BH CV ECHO MEAS - PA MAX PG: 2.5 MMHG
BH CV ECHO MEAS - PA PR(ACCEL): 49.3 MMHG
BH CV ECHO MEAS - PA V2 MAX: 78.7 CM/SEC
BH CV ECHO MEAS - PVA(V,A): 2.5 CM^2
BH CV ECHO MEAS - PVA(V,D): 2.5 CM^2
BH CV ECHO MEAS - QP/QS: 0.7
BH CV ECHO MEAS - RV MAX PG: 0.74 MMHG
BH CV ECHO MEAS - RV MEAN PG: 0 MMHG
BH CV ECHO MEAS - RV V1 MAX: 43.1 CM/SEC
BH CV ECHO MEAS - RV V1 MEAN: 25.4 CM/SEC
BH CV ECHO MEAS - RV V1 VTI: 6 CM
BH CV ECHO MEAS - RVOT AREA: 4.5 CM^2
BH CV ECHO MEAS - RVOT DIAM: 2.4 CM
BH CV ECHO MEAS - SI(AO): 27.9 ML/M^2
BH CV ECHO MEAS - SI(CUBED): 38.1 ML/M^2
BH CV ECHO MEAS - SI(LVOT): 13.9 ML/M^2
BH CV ECHO MEAS - SI(MOD-SP2): 17.3 ML/M^2
BH CV ECHO MEAS - SI(MOD-SP4): 14.1 ML/M^2
BH CV ECHO MEAS - SI(TEICH): 31.3 ML/M^2
BH CV ECHO MEAS - SV(AO): 79 ML
BH CV ECHO MEAS - SV(CUBED): 107.8 ML
BH CV ECHO MEAS - SV(LVOT): 39.3 ML
BH CV ECHO MEAS - SV(MOD-SP2): 49 ML
BH CV ECHO MEAS - SV(MOD-SP4): 40 ML
BH CV ECHO MEAS - SV(RVOT): 27.3 ML
BH CV ECHO MEAS - SV(TEICH): 88.5 ML
BH CV ECHO MEAS - TAPSE (>1.6): 1.4 CM
BH CV ECHO MEAS - TR MAX VEL: 227 CM/SEC
BH CV VAS BP RIGHT ARM: NORMAL MMHG
BH CV XLRA - RV BASE: 3.9 CM
BH CV XLRA - RV LENGTH: 8.6 CM
BH CV XLRA - RV MID: 3.1 CM
BH CV XLRA - TDI S': 9.4 CM/SEC
LEFT ATRIUM VOLUME INDEX: 28 ML/M2
MAXIMAL PREDICTED HEART RATE: 165 BPM
STRESS TARGET HR: 140 BPM

## 2021-03-31 ENCOUNTER — APPOINTMENT (OUTPATIENT)
Dept: CARDIOLOGY | Facility: HOSPITAL | Age: 56
End: 2021-03-31

## 2022-03-23 RX ORDER — DIGOXIN 125 MCG
TABLET ORAL
Qty: 30 TABLET | Refills: 11 | OUTPATIENT
Start: 2022-03-23

## 2022-08-24 ENCOUNTER — OFFICE VISIT (OUTPATIENT)
Dept: CARDIOLOGY | Age: 57
End: 2022-08-24

## 2022-08-24 VITALS
BODY MASS INDEX: 38.36 KG/M2 | HEIGHT: 76 IN | HEART RATE: 69 BPM | WEIGHT: 315 LBS | DIASTOLIC BLOOD PRESSURE: 79 MMHG | SYSTOLIC BLOOD PRESSURE: 169 MMHG

## 2022-08-24 DIAGNOSIS — I42.0 DILATED CARDIOMYOPATHY: ICD-10-CM

## 2022-08-24 DIAGNOSIS — R94.31 ABNORMAL ELECTROCARDIOGRAM: ICD-10-CM

## 2022-08-24 DIAGNOSIS — I48.21 PERMANENT ATRIAL FIBRILLATION: Primary | ICD-10-CM

## 2022-08-24 DIAGNOSIS — I10 PRIMARY HYPERTENSION: ICD-10-CM

## 2022-08-24 DIAGNOSIS — I25.10 CORONARY ARTERY DISEASE INVOLVING NATIVE CORONARY ARTERY OF NATIVE HEART WITHOUT ANGINA PECTORIS: ICD-10-CM

## 2022-08-24 PROCEDURE — 99214 OFFICE O/P EST MOD 30 MIN: CPT | Performed by: INTERNAL MEDICINE

## 2022-08-24 PROCEDURE — 93000 ELECTROCARDIOGRAM COMPLETE: CPT | Performed by: INTERNAL MEDICINE

## 2022-08-24 RX ORDER — METHYLPREDNISOLONE 4 MG/1
TABLET ORAL SEE ADMIN INSTRUCTIONS
COMMUNITY
Start: 2022-08-24 | End: 2022-08-24

## 2022-09-15 ENCOUNTER — HOSPITAL ENCOUNTER (OUTPATIENT)
Dept: CARDIOLOGY | Facility: HOSPITAL | Age: 57
Discharge: HOME | End: 2022-09-15

## 2022-09-15 VITALS
BODY MASS INDEX: 42.12 KG/M2 | HEART RATE: 120 BPM | OXYGEN SATURATION: 97 % | DIASTOLIC BLOOD PRESSURE: 60 MMHG | SYSTOLIC BLOOD PRESSURE: 110 MMHG | HEIGHT: 76 IN

## 2022-09-15 VITALS — BODY MASS INDEX: 42.12 KG/M2 | WEIGHT: 315 LBS

## 2022-09-15 LAB
BH CV IMMEDIATE POST TECH DATA BLOOD PRESSURE: NORMAL MMHG
BH CV IMMEDIATE POST TECH DATA HEART RATE: 136 BPM
BH CV REST NUCLEAR ISOTOPE DOSE: 10.9 MCI
BH CV STRESS BP STAGE 1: NORMAL
BH CV STRESS DURATION MIN STAGE 1: 1
BH CV STRESS DURATION SEC STAGE 1: 53
BH CV STRESS GRADE STAGE 1: 0
BH CV STRESS HR STAGE 1: 156
BH CV STRESS METS STAGE 1: 2.2
BH CV STRESS NUCLEAR ISOTOPE DOSE: 29.5 MCI
BH CV STRESS PROTOCOL 1: NORMAL
BH CV STRESS RECOVERY BP: NORMAL MMHG
BH CV STRESS RECOVERY HR: 100 BPM
BH CV STRESS SPEED STAGE 1: 1.7
BH CV STRESS STAGE 1: 1
BH CV THREE MINUTE POST TECH DATA BLOOD PRESSURE: NORMAL MMHG
BH CV THREE MINUTE POST TECH DATA HEART RATE: 115 BPM
LV EF NUC BP: 42 %
MAXIMAL PREDICTED HEART RATE: 164 BPM
PERCENT MAX PREDICTED HR: 95.12 %
STRESS BASELINE BP: NORMAL MMHG
STRESS BASELINE HR: 120 BPM
STRESS O2 SAT REST: 97 %
STRESS PERCENT HR: 112 %
STRESS POST ESTIMATED WORKLOAD: 2.2 METS
STRESS POST EXERCISE DUR MIN: 1 MIN
STRESS POST EXERCISE DUR SEC: 53 SEC
STRESS POST PEAK BP: NORMAL MMHG
STRESS POST PEAK HR: 156 BPM
STRESS TARGET HR: 139 BPM

## 2022-09-15 PROCEDURE — 0 TECHNETIUM SESTAMIBI: Performed by: INTERNAL MEDICINE

## 2022-09-15 PROCEDURE — 78452 HT MUSCLE IMAGE SPECT MULT: CPT

## 2022-09-15 PROCEDURE — 93017 CV STRESS TEST TRACING ONLY: CPT

## 2022-09-15 PROCEDURE — 93018 CV STRESS TEST I&R ONLY: CPT | Performed by: INTERNAL MEDICINE

## 2022-09-15 PROCEDURE — 78452 HT MUSCLE IMAGE SPECT MULT: CPT | Performed by: INTERNAL MEDICINE

## 2022-09-15 PROCEDURE — 25010000002 REGADENOSON 0.4 MG/5ML SOLUTION: Performed by: INTERNAL MEDICINE

## 2022-09-15 PROCEDURE — A9500 TC99M SESTAMIBI: HCPCS | Performed by: INTERNAL MEDICINE

## 2022-09-15 RX ADMIN — TECHNETIUM TC 99M SESTAMIBI 1 DOSE: 1 INJECTION INTRAVENOUS at 08:05

## 2022-09-15 RX ADMIN — TECHNETIUM TC 99M SESTAMIBI 1 DOSE: 1 INJECTION INTRAVENOUS at 11:37

## 2022-09-21 ENCOUNTER — HOSPITAL ENCOUNTER (OUTPATIENT)
Dept: CARDIOLOGY | Facility: HOSPITAL | Age: 57
Discharge: HOME | End: 2022-09-21
Admitting: INTERNAL MEDICINE

## 2022-09-21 VITALS
HEART RATE: 105 BPM | WEIGHT: 315 LBS | DIASTOLIC BLOOD PRESSURE: 86 MMHG | HEIGHT: 76 IN | BODY MASS INDEX: 38.36 KG/M2 | SYSTOLIC BLOOD PRESSURE: 124 MMHG

## 2022-09-21 PROCEDURE — 93306 TTE W/DOPPLER COMPLETE: CPT

## 2022-09-21 PROCEDURE — 25010000002 PERFLUTREN (DEFINITY) 8.476 MG IN SODIUM CHLORIDE (PF) 0.9 % 10 ML INJECTION: Performed by: INTERNAL MEDICINE

## 2022-09-21 PROCEDURE — 93306 TTE W/DOPPLER COMPLETE: CPT | Performed by: INTERNAL MEDICINE

## 2022-09-21 RX ADMIN — SODIUM CHLORIDE 3 ML: 9 INJECTION INTRAMUSCULAR; INTRAVENOUS; SUBCUTANEOUS at 15:26

## 2022-09-22 LAB
BH CV ECHO MEAS - ACS: 2.6 CM
BH CV ECHO MEAS - AO MAX PG: 3.7 MMHG
BH CV ECHO MEAS - AO MEAN PG: 3 MMHG
BH CV ECHO MEAS - AO ROOT DIAM: 2.9 CM
BH CV ECHO MEAS - AO V2 MAX: 96.4 CM/SEC
BH CV ECHO MEAS - AO V2 VTI: 18.2 CM
BH CV ECHO MEAS - AVA(I,D): 3.3 CM2
BH CV ECHO MEAS - EDV(CUBED): 60 ML
BH CV ECHO MEAS - EDV(MOD-SP2): 76 ML
BH CV ECHO MEAS - EDV(MOD-SP4): 100 ML
BH CV ECHO MEAS - EF(MOD-BP): 40.5 %
BH CV ECHO MEAS - EF(MOD-SP2): 42.1 %
BH CV ECHO MEAS - EF(MOD-SP4): 46 %
BH CV ECHO MEAS - ESV(CUBED): 28.9 ML
BH CV ECHO MEAS - ESV(MOD-SP2): 44 ML
BH CV ECHO MEAS - ESV(MOD-SP4): 54 ML
BH CV ECHO MEAS - FS: 21.7 %
BH CV ECHO MEAS - IVS/LVPW: 1.04 CM
BH CV ECHO MEAS - IVSD: 1.5 CM
BH CV ECHO MEAS - LA DIMENSION: 3.6 CM
BH CV ECHO MEAS - LV DIASTOLIC VOL/BSA (35-75): 35.8 CM2
BH CV ECHO MEAS - LV MASS(C)D: 220 GRAMS
BH CV ECHO MEAS - LV MAX PG: 1.9 MMHG
BH CV ECHO MEAS - LV MEAN PG: 1.18 MMHG
BH CV ECHO MEAS - LV SYSTOLIC VOL/BSA (12-30): 19.3 CM2
BH CV ECHO MEAS - LV V1 MAX: 68.9 CM/SEC
BH CV ECHO MEAS - LV V1 VTI: 12.3 CM
BH CV ECHO MEAS - LVIDD: 3.9 CM
BH CV ECHO MEAS - LVIDS: 3.1 CM
BH CV ECHO MEAS - LVOT AREA: 4.9 CM2
BH CV ECHO MEAS - LVOT DIAM: 2.5 CM
BH CV ECHO MEAS - LVPWD: 1.45 CM
BH CV ECHO MEAS - MV DEC SLOPE: 305.6 CM/SEC2
BH CV ECHO MEAS - MV DEC TIME: 0.2 MSEC
BH CV ECHO MEAS - MV E MAX VEL: 91.2 CM/SEC
BH CV ECHO MEAS - MV MAX PG: 3.1 MMHG
BH CV ECHO MEAS - MV MEAN PG: 1.54 MMHG
BH CV ECHO MEAS - MV P1/2T: 80.7 MSEC
BH CV ECHO MEAS - MV V2 VTI: 14.9 CM
BH CV ECHO MEAS - MVA(P1/2T): 2.7 CM2
BH CV ECHO MEAS - MVA(VTI): 4 CM2
BH CV ECHO MEAS - PA ACC TIME: 0.11 SEC
BH CV ECHO MEAS - PA PR(ACCEL): 29.9 MMHG
BH CV ECHO MEAS - PA V2 MAX: 80.1 CM/SEC
BH CV ECHO MEAS - QP/QS: 0.73
BH CV ECHO MEAS - RAP SYSTOLE: 3 MMHG
BH CV ECHO MEAS - RV MAX PG: 1.33 MMHG
BH CV ECHO MEAS - RV V1 MAX: 57.7 CM/SEC
BH CV ECHO MEAS - RV V1 VTI: 9.7 CM
BH CV ECHO MEAS - RVDD: 3.7 CM
BH CV ECHO MEAS - RVOT DIAM: 2.4 CM
BH CV ECHO MEAS - RVSP: 32.2 MMHG
BH CV ECHO MEAS - SI(MOD-SP2): 11.4 ML/M2
BH CV ECHO MEAS - SI(MOD-SP4): 16.4 ML/M2
BH CV ECHO MEAS - SV(LVOT): 60.4 ML
BH CV ECHO MEAS - SV(MOD-SP2): 32 ML
BH CV ECHO MEAS - SV(MOD-SP4): 46 ML
BH CV ECHO MEAS - SV(RVOT): 44 ML
BH CV ECHO MEAS - TAPSE (>1.6): 1.95 CM
BH CV ECHO MEAS - TR MAX PG: 29.2 MMHG
BH CV ECHO MEAS - TR MAX VEL: 270.1 CM/SEC
BH CV XLRA - RV BASE: 3.8 CM
BH CV XLRA - RV LENGTH: 7.7 CM
BH CV XLRA - RV MID: 3.4 CM
LV EF 2D ECHO EST: 40 %
MAXIMAL PREDICTED HEART RATE: 164 BPM
STRESS TARGET HR: 139 BPM

## 2022-10-05 ENCOUNTER — OFFICE VISIT (OUTPATIENT)
Dept: CARDIOLOGY | Age: 57
End: 2022-10-05

## 2022-10-05 VITALS
SYSTOLIC BLOOD PRESSURE: 131 MMHG | WEIGHT: 315 LBS | HEART RATE: 123 BPM | BODY MASS INDEX: 38.36 KG/M2 | DIASTOLIC BLOOD PRESSURE: 84 MMHG | HEIGHT: 76 IN

## 2022-10-05 DIAGNOSIS — E78.00 PURE HYPERCHOLESTEROLEMIA: ICD-10-CM

## 2022-10-05 DIAGNOSIS — I48.20 CHRONIC ATRIAL FIBRILLATION: ICD-10-CM

## 2022-10-05 DIAGNOSIS — R94.39 ABNORMAL NUCLEAR STRESS TEST: Primary | ICD-10-CM

## 2022-10-05 DIAGNOSIS — I25.10 CORONARY ARTERY DISEASE INVOLVING NATIVE CORONARY ARTERY OF NATIVE HEART WITHOUT ANGINA PECTORIS: ICD-10-CM

## 2022-10-05 DIAGNOSIS — I10 PRIMARY HYPERTENSION: ICD-10-CM

## 2022-10-05 PROCEDURE — 99214 OFFICE O/P EST MOD 30 MIN: CPT | Performed by: INTERNAL MEDICINE

## 2022-10-05 NOTE — H&P (VIEW-ONLY)
TEST RESULTS   Subjective:        Marcus Simon is a 57 y.o. male who here for follow up    CC  ABNORMAL CARDIAC FUN TEST  HPI  57 years old male with a chronic atrial fibrillation coronary artery disease hyperlipidemia and hypertension had a stress test which is positive     Problems Addressed this Visit        Cardiac and Vasculature    Hypertension    Hyperlipidemia    Chronic atrial fibrillation (HCC)    Coronary artery disease involving native coronary artery of native heart without angina pectoris      Other Visit Diagnoses     Abnormal nuclear stress test    -  Primary    Relevant Orders    Case Request Cath Lab: Left Heart Cath (Completed)    CBC & Differential    Basic Metabolic Panel    aPTT    Protime-INR      Diagnoses       Codes Comments    Abnormal nuclear stress test    -  Primary ICD-10-CM: R94.39  ICD-9-CM: 794.39     Chronic atrial fibrillation (HCC)     ICD-10-CM: I48.20  ICD-9-CM: 427.31     Coronary artery disease involving native coronary artery of native heart without angina pectoris     ICD-10-CM: I25.10  ICD-9-CM: 414.01     Primary hypertension     ICD-10-CM: I10  ICD-9-CM: 401.9     Pure hypercholesterolemia     ICD-10-CM: E78.00  ICD-9-CM: 272.0         .    The following portions of the patient's history were reviewed and updated as appropriate: allergies, current medications, past family history, past medical history, past social history, past surgical history and problem list.    Past Medical History:   Diagnosis Date   • Bulging disc    • CAD (coronary artery disease)    • CHF (congestive heart failure) ([facility])    • DDD (degenerative disc disease)    • HLD (hyperlipidemia)    • HTN (hypertension)    • Hypothyroidism    • MI (myocardial infarction) ([facility])    • Osteoarthritis    • PAF (paroxysmal atrial fibrillation) ([facility])    • Peptic ulcer    • SOB (shortness of breath)    • Transient cerebral ischemia      reports that he has never smoked. He has never used smokeless tobacco. He  "reports that he does not drink alcohol and does not use drugs.   Family History   Problem Relation Age of Onset   • Heart attack Mother    • No Known Problems Father    • No Known Problems Sister    • No Known Problems Brother    • No Known Problems Maternal Aunt    • No Known Problems Maternal Uncle    • No Known Problems Paternal Aunt    • No Known Problems Paternal Uncle    • No Known Problems Maternal Grandmother    • No Known Problems Maternal Grandfather    • No Known Problems Paternal Grandmother    • No Known Problems Paternal Grandfather        Review of Systems  Constitutional: No wt loss, fever, fatigue  Gastrointestinal: No nausea, abdominal pain  Behavioral/Psych: No insomnia or anxiety   Cardiovascular chest pain  Objective:       Physical Exam           Physical Exam  /84   Pulse (!) 123   Ht 193 cm (76\")   Wt (!) 164 kg (361 lb)   BMI 43.94 kg/m²     General appearance: No acute changes   Eyes: Sclerae conjunctivae normal, pupils reactive   HENT: Atraumatic; oropharynx clear with moist mucous membranes and no mucosal ulcerations;  Neck: Trachea midline; NECK, supple, no thyromegaly or lymphadenopathy   Lungs: Normal size and shape, normal breath sounds, equal distribution of air, no rales and rhonchi   CV: S1-S2 regular, no murmurs, no rub, no gallop   Abdomen: Soft, nontender; no masses , no abnormal abdominal sounds   Extremities: No deformity , normal color , no peripheral edema   Skin: Normal temperature, turgor and texture; no rash, ulcers  Psych: Appropriate affect, alert and oriented to person, place and time           Procedures      Echocardiogram:        Current Outpatient Medications:   •  amLODIPine-benazepril (LOTREL) 10-20 MG per capsule, Take 1 capsule by mouth Daily., Disp: , Rfl:   •  baclofen (LIORESAL) 10 MG tablet, Take 10 mg by mouth 3 (Three) Times a Day., Disp: , Rfl:   •  digoxin (LANOXIN) 125 MCG tablet, Take 1 tablet by mouth Daily., Disp: 30 tablet, Rfl: 11  •  " ferrous gluconate (FERGON) 324 MG tablet, , Disp: , Rfl:   •  furosemide (LASIX) 20 MG tablet, Take 0.5 tablets by mouth Daily. (Patient taking differently: Take 20 mg by mouth 2 (Two) Times a Day. PT STATES HAD BEEN TAKING 20 MG DAILY, INCREASED TO 40 MG FOR THE PAST MONTH.), Disp: 30 tablet, Rfl: 6  •  gabapentin (NEURONTIN) 300 MG capsule, , Disp: , Rfl:   •  levothyroxine (SYNTHROID, LEVOTHROID) 150 MCG tablet, Take 150 mcg by mouth Daily., Disp: , Rfl:   •  metoprolol succinate XL (TOPROL-XL) 100 MG 24 hr tablet, Take 100 mg by mouth Daily., Disp: , Rfl:   •  warfarin (COUMADIN) 5 MG tablet, Take 5 mg by mouth Daily., Disp: , Rfl:    Assessment:        Patient Active Problem List   Diagnosis   • Dyspnea on exertion   • Congestive heart failure (HCC)   • Cardiomyopathy (HCC)   • Hypertension   • Spinal stenosis of lumbar region   • Morbid (severe) obesity due to excess calories (HCC)   • Old myocardial infarction   • Osteoarthritis of lumbar spine with myelopathy   • Hypothyroidism   • Hyperlipidemia   • Chronic atrial fibrillation (HCC)   • Long term current use of anticoagulant   • Coronary artery disease involving native coronary artery of native heart without angina pectoris   • Permanent atrial fibrillation (HCC)     Interpretation Summary       · Findings consistent with a normal ECG stress test.  · Left ventricular ejection fraction is mildly reduced. (Calculated EF = 42%).  · Myocardial perfusion imaging indicates a small-sized, mildly severe area of ischemia located in the inferior wall.  · Impressions are consistent with an intermediate risk study.    Interpretation Summary    · Estimated left ventricular EF = 40% Left ventricular systolic function is mildly decreased.  · Left ventricular diastolic function was indeterminate.  · Left ventricular wall thickness is consistent with moderate concentric hypertrophy.  · The right ventricular cavity is borderline dilated.  · The right atrial cavity is mildly  dilated.  · Mildly reduced right ventricular systolic function noted.  · Estimated right ventricular systolic pressure from tricuspid regurgitation is normal (<35 mmHg).  · The following left ventricular wall segments are hypokinetic: mid anterior, apical anterior, basal anterolateral, mid anterolateral, apical lateral, basal inferolateral, mid inferolateral, apical inferior, mid inferior, apical septal, basal inferoseptal, mid inferoseptal, apex hypokinetic, mid anteroseptal, basal anterior, basal inferior and basal inferoseptal.               Plan:            ICD-10-CM ICD-9-CM   1. Abnormal nuclear stress test  R94.39 794.39   2. Chronic atrial fibrillation (HCC)  I48.20 427.31   3. Coronary artery disease involving native coronary artery of native heart without angina pectoris  I25.10 414.01   4. Primary hypertension  I10 401.9   5. Pure hypercholesterolemia  E78.00 272.0     1. Abnormal nuclear stress test  Procedure, risks and options of cardiac cath explained to pt INCLUDING BUT NOT LIMITED TO MI, STROKE, DEATH, INFECTION HAEMORRHAGE, . Pt understands well and agrees with no further questions.    - Case Request Cath Lab: Left Heart Cath  - CBC & Differential; Future  - Basic Metabolic Panel  - aPTT; Future  - Protime-INR; Future    2. Chronic atrial fibrillation (HCC)  Under control    3. Coronary artery disease involving native coronary artery of native heart without angina pectoris  No angina pectoris    4. Primary hypertension  Continue current treatment    5. Pure hypercholesterolemia  Continue current treatment     Procedure, risks and options of cardiac cath explained to pt INCLUDING BUT NOT LIMITED TO MI, STROKE, DEATH, INFECTION HAEMORRHAGE, . Pt understands well and agrees with no further questions.      STOP WARFARIN 3 DAYS  COUNSELING:    Marcus Beverly was given to patient for the following topics: diagnostic results, risk factor reductions, impressions, risks and benefits of treatment  options and importance of treatment compliance .       SMOKING COUNSELING:        Dictated using Dragon dictation

## 2022-10-05 NOTE — PROGRESS NOTES
TEST RESULTS   Subjective:        Marcus Simon is a 57 y.o. male who here for follow up    CC  ABNORMAL CARDIAC FUN TEST  HPI  57 years old male with a chronic atrial fibrillation coronary artery disease hyperlipidemia and hypertension had a stress test which is positive     Problems Addressed this Visit        Cardiac and Vasculature    Hypertension    Hyperlipidemia    Chronic atrial fibrillation (HCC)    Coronary artery disease involving native coronary artery of native heart without angina pectoris      Other Visit Diagnoses     Abnormal nuclear stress test    -  Primary    Relevant Orders    Case Request Cath Lab: Left Heart Cath (Completed)    CBC & Differential    Basic Metabolic Panel    aPTT    Protime-INR      Diagnoses       Codes Comments    Abnormal nuclear stress test    -  Primary ICD-10-CM: R94.39  ICD-9-CM: 794.39     Chronic atrial fibrillation (HCC)     ICD-10-CM: I48.20  ICD-9-CM: 427.31     Coronary artery disease involving native coronary artery of native heart without angina pectoris     ICD-10-CM: I25.10  ICD-9-CM: 414.01     Primary hypertension     ICD-10-CM: I10  ICD-9-CM: 401.9     Pure hypercholesterolemia     ICD-10-CM: E78.00  ICD-9-CM: 272.0         .    The following portions of the patient's history were reviewed and updated as appropriate: allergies, current medications, past family history, past medical history, past social history, past surgical history and problem list.    Past Medical History:   Diagnosis Date   • Bulging disc    • CAD (coronary artery disease)    • CHF (congestive heart failure) ([facility])    • DDD (degenerative disc disease)    • HLD (hyperlipidemia)    • HTN (hypertension)    • Hypothyroidism    • MI (myocardial infarction) ([facility])    • Osteoarthritis    • PAF (paroxysmal atrial fibrillation) ([facility])    • Peptic ulcer    • SOB (shortness of breath)    • Transient cerebral ischemia      reports that he has never smoked. He has never used smokeless tobacco. He  "reports that he does not drink alcohol and does not use drugs.   Family History   Problem Relation Age of Onset   • Heart attack Mother    • No Known Problems Father    • No Known Problems Sister    • No Known Problems Brother    • No Known Problems Maternal Aunt    • No Known Problems Maternal Uncle    • No Known Problems Paternal Aunt    • No Known Problems Paternal Uncle    • No Known Problems Maternal Grandmother    • No Known Problems Maternal Grandfather    • No Known Problems Paternal Grandmother    • No Known Problems Paternal Grandfather        Review of Systems  Constitutional: No wt loss, fever, fatigue  Gastrointestinal: No nausea, abdominal pain  Behavioral/Psych: No insomnia or anxiety   Cardiovascular chest pain  Objective:       Physical Exam           Physical Exam  /84   Pulse (!) 123   Ht 193 cm (76\")   Wt (!) 164 kg (361 lb)   BMI 43.94 kg/m²     General appearance: No acute changes   Eyes: Sclerae conjunctivae normal, pupils reactive   HENT: Atraumatic; oropharynx clear with moist mucous membranes and no mucosal ulcerations;  Neck: Trachea midline; NECK, supple, no thyromegaly or lymphadenopathy   Lungs: Normal size and shape, normal breath sounds, equal distribution of air, no rales and rhonchi   CV: S1-S2 regular, no murmurs, no rub, no gallop   Abdomen: Soft, nontender; no masses , no abnormal abdominal sounds   Extremities: No deformity , normal color , no peripheral edema   Skin: Normal temperature, turgor and texture; no rash, ulcers  Psych: Appropriate affect, alert and oriented to person, place and time           Procedures      Echocardiogram:        Current Outpatient Medications:   •  amLODIPine-benazepril (LOTREL) 10-20 MG per capsule, Take 1 capsule by mouth Daily., Disp: , Rfl:   •  baclofen (LIORESAL) 10 MG tablet, Take 10 mg by mouth 3 (Three) Times a Day., Disp: , Rfl:   •  digoxin (LANOXIN) 125 MCG tablet, Take 1 tablet by mouth Daily., Disp: 30 tablet, Rfl: 11  •  " ferrous gluconate (FERGON) 324 MG tablet, , Disp: , Rfl:   •  furosemide (LASIX) 20 MG tablet, Take 0.5 tablets by mouth Daily. (Patient taking differently: Take 20 mg by mouth 2 (Two) Times a Day. PT STATES HAD BEEN TAKING 20 MG DAILY, INCREASED TO 40 MG FOR THE PAST MONTH.), Disp: 30 tablet, Rfl: 6  •  gabapentin (NEURONTIN) 300 MG capsule, , Disp: , Rfl:   •  levothyroxine (SYNTHROID, LEVOTHROID) 150 MCG tablet, Take 150 mcg by mouth Daily., Disp: , Rfl:   •  metoprolol succinate XL (TOPROL-XL) 100 MG 24 hr tablet, Take 100 mg by mouth Daily., Disp: , Rfl:   •  warfarin (COUMADIN) 5 MG tablet, Take 5 mg by mouth Daily., Disp: , Rfl:    Assessment:        Patient Active Problem List   Diagnosis   • Dyspnea on exertion   • Congestive heart failure (HCC)   • Cardiomyopathy (HCC)   • Hypertension   • Spinal stenosis of lumbar region   • Morbid (severe) obesity due to excess calories (HCC)   • Old myocardial infarction   • Osteoarthritis of lumbar spine with myelopathy   • Hypothyroidism   • Hyperlipidemia   • Chronic atrial fibrillation (HCC)   • Long term current use of anticoagulant   • Coronary artery disease involving native coronary artery of native heart without angina pectoris   • Permanent atrial fibrillation (HCC)     Interpretation Summary       · Findings consistent with a normal ECG stress test.  · Left ventricular ejection fraction is mildly reduced. (Calculated EF = 42%).  · Myocardial perfusion imaging indicates a small-sized, mildly severe area of ischemia located in the inferior wall.  · Impressions are consistent with an intermediate risk study.    Interpretation Summary    · Estimated left ventricular EF = 40% Left ventricular systolic function is mildly decreased.  · Left ventricular diastolic function was indeterminate.  · Left ventricular wall thickness is consistent with moderate concentric hypertrophy.  · The right ventricular cavity is borderline dilated.  · The right atrial cavity is mildly  dilated.  · Mildly reduced right ventricular systolic function noted.  · Estimated right ventricular systolic pressure from tricuspid regurgitation is normal (<35 mmHg).  · The following left ventricular wall segments are hypokinetic: mid anterior, apical anterior, basal anterolateral, mid anterolateral, apical lateral, basal inferolateral, mid inferolateral, apical inferior, mid inferior, apical septal, basal inferoseptal, mid inferoseptal, apex hypokinetic, mid anteroseptal, basal anterior, basal inferior and basal inferoseptal.               Plan:            ICD-10-CM ICD-9-CM   1. Abnormal nuclear stress test  R94.39 794.39   2. Chronic atrial fibrillation (HCC)  I48.20 427.31   3. Coronary artery disease involving native coronary artery of native heart without angina pectoris  I25.10 414.01   4. Primary hypertension  I10 401.9   5. Pure hypercholesterolemia  E78.00 272.0     1. Abnormal nuclear stress test  Procedure, risks and options of cardiac cath explained to pt INCLUDING BUT NOT LIMITED TO MI, STROKE, DEATH, INFECTION HAEMORRHAGE, . Pt understands well and agrees with no further questions.    - Case Request Cath Lab: Left Heart Cath  - CBC & Differential; Future  - Basic Metabolic Panel  - aPTT; Future  - Protime-INR; Future    2. Chronic atrial fibrillation (HCC)  Under control    3. Coronary artery disease involving native coronary artery of native heart without angina pectoris  No angina pectoris    4. Primary hypertension  Continue current treatment    5. Pure hypercholesterolemia  Continue current treatment     Procedure, risks and options of cardiac cath explained to pt INCLUDING BUT NOT LIMITED TO MI, STROKE, DEATH, INFECTION HAEMORRHAGE, . Pt understands well and agrees with no further questions.      STOP WARFARIN 3 DAYS  COUNSELING:    Marcus Beverly was given to patient for the following topics: diagnostic results, risk factor reductions, impressions, risks and benefits of treatment  options and importance of treatment compliance .       SMOKING COUNSELING:        Dictated using Dragon dictation

## 2022-10-06 PROBLEM — R94.39 ABNORMAL NUCLEAR STRESS TEST: Status: ACTIVE | Noted: 2022-10-06

## 2022-10-14 ENCOUNTER — HOSPITAL ENCOUNTER (OUTPATIENT)
Dept: CARDIOLOGY | Facility: HOSPITAL | Age: 57
Discharge: HOME | End: 2022-10-14
Admitting: INTERNAL MEDICINE

## 2022-10-14 DIAGNOSIS — R94.39 ABNORMAL NUCLEAR STRESS TEST: ICD-10-CM

## 2022-10-14 LAB
ANION GAP SERPL CALCULATED.3IONS-SCNC: 9.5 MMOL/L (ref 5–15)
APTT PPP: 33.1 SECONDS (ref 22.7–35.4)
BASOPHILS # BLD AUTO: 0.03 10*3/MM3 (ref 0–0.2)
BASOPHILS NFR BLD AUTO: 0.7 % (ref 0–1.5)
BUN SERPL-MCNC: 15 MG/DL (ref 6–20)
BUN/CREAT SERPL: 19.2 (ref 7–25)
CALCIUM SPEC-SCNC: 9.5 MG/DL (ref 8.6–10.5)
CHLORIDE SERPL-SCNC: 101 MMOL/L (ref 98–107)
CO2 SERPL-SCNC: 28.5 MMOL/L (ref 22–29)
CREAT SERPL-MCNC: 0.78 MG/DL (ref 0.76–1.27)
DEPRECATED RDW RBC AUTO: 47.7 FL (ref 37–54)
EGFRCR SERPLBLD CKD-EPI 2021: 104.7 ML/MIN/1.73
EOSINOPHIL # BLD AUTO: 0.16 10*3/MM3 (ref 0–0.4)
EOSINOPHIL NFR BLD AUTO: 3.7 % (ref 0.3–6.2)
ERYTHROCYTE [DISTWIDTH] IN BLOOD BY AUTOMATED COUNT: 13 % (ref 12.3–15.4)
GLUCOSE SERPL-MCNC: 235 MG/DL (ref 65–99)
HCT VFR BLD AUTO: 45.4 % (ref 37.5–51)
HGB BLD-MCNC: 14.9 G/DL (ref 13–17.7)
IMM GRANULOCYTES # BLD AUTO: 0.01 10*3/MM3 (ref 0–0.05)
IMM GRANULOCYTES NFR BLD AUTO: 0.2 % (ref 0–0.5)
INR PPP: 1.71 (ref 0.9–1.1)
LYMPHOCYTES # BLD AUTO: 0.9 10*3/MM3 (ref 0.7–3.1)
LYMPHOCYTES NFR BLD AUTO: 20.6 % (ref 19.6–45.3)
MCH RBC QN AUTO: 33.2 PG (ref 26.6–33)
MCHC RBC AUTO-ENTMCNC: 32.8 G/DL (ref 31.5–35.7)
MCV RBC AUTO: 101.1 FL (ref 79–97)
MONOCYTES # BLD AUTO: 0.34 10*3/MM3 (ref 0.1–0.9)
MONOCYTES NFR BLD AUTO: 7.8 % (ref 5–12)
NEUTROPHILS NFR BLD AUTO: 2.93 10*3/MM3 (ref 1.7–7)
NEUTROPHILS NFR BLD AUTO: 67 % (ref 42.7–76)
NRBC BLD AUTO-RTO: 0 /100 WBC (ref 0–0.2)
PLATELET # BLD AUTO: 255 10*3/MM3 (ref 140–450)
PMV BLD AUTO: 9.6 FL (ref 6–12)
POTASSIUM SERPL-SCNC: 4.4 MMOL/L (ref 3.5–5.2)
PROTHROMBIN TIME: 20.3 SECONDS (ref 11.7–14.2)
RBC # BLD AUTO: 4.49 10*6/MM3 (ref 4.14–5.8)
SODIUM SERPL-SCNC: 139 MMOL/L (ref 136–145)
WBC NRBC COR # BLD: 4.37 10*3/MM3 (ref 3.4–10.8)

## 2022-10-14 PROCEDURE — 36415 COLL VENOUS BLD VENIPUNCTURE: CPT | Performed by: INTERNAL MEDICINE

## 2022-10-14 PROCEDURE — 85025 COMPLETE CBC W/AUTO DIFF WBC: CPT | Performed by: INTERNAL MEDICINE

## 2022-10-14 PROCEDURE — 80048 BASIC METABOLIC PNL TOTAL CA: CPT | Performed by: INTERNAL MEDICINE

## 2022-10-14 PROCEDURE — 85610 PROTHROMBIN TIME: CPT | Performed by: INTERNAL MEDICINE

## 2022-10-14 PROCEDURE — 85730 THROMBOPLASTIN TIME PARTIAL: CPT | Performed by: INTERNAL MEDICINE

## 2022-10-19 ENCOUNTER — HOSPITAL ENCOUNTER (OUTPATIENT)
Facility: HOSPITAL | Age: 57
Setting detail: HOSPITAL OUTPATIENT SURGERY
Discharge: HOME | End: 2022-10-19
Attending: INTERNAL MEDICINE | Admitting: INTERNAL MEDICINE

## 2022-10-19 VITALS
HEART RATE: 91 BPM | SYSTOLIC BLOOD PRESSURE: 122 MMHG | DIASTOLIC BLOOD PRESSURE: 91 MMHG | OXYGEN SATURATION: 95 % | BODY MASS INDEX: 38.36 KG/M2 | TEMPERATURE: 96.8 F | WEIGHT: 315 LBS | RESPIRATION RATE: 18 BRPM | HEIGHT: 76 IN

## 2022-10-19 DIAGNOSIS — R94.39 ABNORMAL NUCLEAR STRESS TEST: ICD-10-CM

## 2022-10-19 LAB
INR PPP: 1.3 (ref 0.8–1.2)
PROTHROMBIN TIME: 15.9 SECONDS (ref 12.8–15.2)

## 2022-10-19 PROCEDURE — C1894 INTRO/SHEATH, NON-LASER: HCPCS | Performed by: INTERNAL MEDICINE

## 2022-10-19 PROCEDURE — C1769 GUIDE WIRE: HCPCS | Performed by: INTERNAL MEDICINE

## 2022-10-19 PROCEDURE — 85610 PROTHROMBIN TIME: CPT

## 2022-10-19 PROCEDURE — 25010000002 MIDAZOLAM PER 1 MG: Performed by: INTERNAL MEDICINE

## 2022-10-19 PROCEDURE — 25010000002 HEPARIN (PORCINE) PER 1000 UNITS: Performed by: INTERNAL MEDICINE

## 2022-10-19 PROCEDURE — 93458 L HRT ARTERY/VENTRICLE ANGIO: CPT | Performed by: INTERNAL MEDICINE

## 2022-10-19 PROCEDURE — 0 IOPAMIDOL PER 1 ML: Performed by: INTERNAL MEDICINE

## 2022-10-19 PROCEDURE — 25010000002 FENTANYL CITRATE (PF) 50 MCG/ML SOLUTION: Performed by: INTERNAL MEDICINE

## 2022-10-19 RX ORDER — MIDAZOLAM HYDROCHLORIDE 1 MG/ML
INJECTION INTRAMUSCULAR; INTRAVENOUS
Status: DISCONTINUED | OUTPATIENT
Start: 2022-10-19 | End: 2022-10-19 | Stop reason: HOSPADM

## 2022-10-19 RX ORDER — ACETAMINOPHEN 325 MG/1
650 TABLET ORAL EVERY 4 HOURS PRN
Status: DISCONTINUED | OUTPATIENT
Start: 2022-10-19 | End: 2022-10-19 | Stop reason: HOSPADM

## 2022-10-19 RX ORDER — SODIUM CHLORIDE 9 MG/ML
75 INJECTION, SOLUTION INTRAVENOUS CONTINUOUS
Status: DISCONTINUED | OUTPATIENT
Start: 2022-10-19 | End: 2022-10-19 | Stop reason: HOSPADM

## 2022-10-19 RX ORDER — ERGOCALCIFEROL (VITAMIN D2) 10 MCG
400 TABLET ORAL DAILY
COMMUNITY

## 2022-10-19 RX ORDER — FENTANYL CITRATE 50 UG/ML
INJECTION, SOLUTION INTRAMUSCULAR; INTRAVENOUS
Status: DISCONTINUED | OUTPATIENT
Start: 2022-10-19 | End: 2022-10-19 | Stop reason: HOSPADM

## 2022-10-19 RX ORDER — SODIUM CHLORIDE 0.9 % (FLUSH) 0.9 %
10 SYRINGE (ML) INJECTION EVERY 12 HOURS SCHEDULED
Status: DISCONTINUED | OUTPATIENT
Start: 2022-10-19 | End: 2022-10-19 | Stop reason: HOSPADM

## 2022-10-19 RX ORDER — SODIUM CHLORIDE 0.9 % (FLUSH) 0.9 %
10 SYRINGE (ML) INJECTION AS NEEDED
Status: DISCONTINUED | OUTPATIENT
Start: 2022-10-19 | End: 2022-10-19 | Stop reason: HOSPADM

## 2022-10-19 RX ORDER — HEPARIN SODIUM 1000 [USP'U]/ML
INJECTION, SOLUTION INTRAVENOUS; SUBCUTANEOUS
Status: DISCONTINUED | OUTPATIENT
Start: 2022-10-19 | End: 2022-10-19 | Stop reason: HOSPADM

## 2022-10-19 RX ORDER — LIDOCAINE HYDROCHLORIDE 20 MG/ML
INJECTION, SOLUTION INFILTRATION; PERINEURAL
Status: DISCONTINUED | OUTPATIENT
Start: 2022-10-19 | End: 2022-10-19 | Stop reason: HOSPADM

## 2022-10-19 RX ORDER — VERAPAMIL HYDROCHLORIDE 2.5 MG/ML
INJECTION, SOLUTION INTRAVENOUS
Status: DISCONTINUED | OUTPATIENT
Start: 2022-10-19 | End: 2022-10-19 | Stop reason: HOSPADM

## 2022-10-19 RX ADMIN — SODIUM CHLORIDE 75 ML/HR: 9 INJECTION, SOLUTION INTRAVENOUS at 08:59

## 2022-10-19 NOTE — DISCHARGE INSTRUCTIONS
[facility]  [address] [place] [address]    Coronary Angiogram (Radial/Ulnar Approach) After Care    Refer to this sheet in the next few weeks. These instructions provide you with information on caring for yourself after your procedure. Your caregiver may also give you more specific instructions. Your treatment has been planned according to current medical practices, but problems sometimes occur. Call your caregiver if you have any problems or questions after your procedure.    Home Care Instructions:  You may shower the day after the procedure. Remove the bandage (dressing) and gently wash the site with plain soap and water. Gently pat the site dry. You may apply a band aid daily for 2 days if desired.    Do not apply powder or lotion to the site.  Do not submerge the affected site in water for 3 to 5 days or until the site is completely healed.   Do not lift, push or pull anything over 5 pounds for 5 days after your procedure or as directed by your physician.  As a reference, a gallon of milk weighs 8 pounds.   Inspect the site at least twice daily. You may notice some bruising at the site and it may be tender for 1 to 2 weeks.     Increase your fluid intake for the next 2 days.    Keep arm elevated for 24 hours. For the remainder of the day, keep your arm in “Pledge of Allegiance” position when up and about.     You may drive 24 hours after the procedure unless otherwise instructed by your caregiver.  Do not operate machinery or power tools for 24 hours.  A responsible adult should be with you for the first 24 hours after you arrive home. Do not make any important legal decisions or sign legal papers for 24 hours.  Do not drink alcohol for 24 hours.    Metformin or any medications containing Metformin should not be taken for 48 hours after your procedure.      Call Your Doctor if:   You have unusual pain at the radial/ulnar (wrist) site.  You have redness, warmth, swelling, or pain at the  radial/ulnar (wrist) site.  You have drainage (other than a small amount of blood on the dressing).  `You have chills or a fever > 101.  Your arm becomes pale or dark, cool, tingly, or numb.  You develop chest pain, shortness of breath, feel faint or pass out.    You have heavy bleeding from the site, hold pressure on the site for 20 minutes.  If the bleeding stops, apply a fresh bandage and call your cardiologist.  However, if you        continue to have bleeding, call 911 and continue to apply pressure to the site.   You have any symptoms of a stroke.  Remember BE FAST  B-balance. Sudden trouble walking or loss of balance.  E-eyes.  Sudden changes in how you see or a sudden onset of a very bad headache.   F-face. Sudden weakness or loss of feeling of the face or facial droop on one side.   A-arms Sudden weakness or numbness in one arm.  One arm drifts down if they are both held out in front of you. This happens suddenly and usually on one side of the body.   S-speech.  Sudden trouble speaking, slurred speech or trouble understanding what are saying.   T-time  Time to call emergency services.  Write down the symptoms and the time they started.

## 2022-11-08 ENCOUNTER — OFFICE VISIT (OUTPATIENT)
Dept: CARDIOLOGY | Facility: CLINIC | Age: 57
End: 2022-11-08

## 2022-11-08 VITALS
HEIGHT: 76 IN | SYSTOLIC BLOOD PRESSURE: 135 MMHG | WEIGHT: 315 LBS | BODY MASS INDEX: 38.36 KG/M2 | HEART RATE: 95 BPM | DIASTOLIC BLOOD PRESSURE: 85 MMHG

## 2022-11-08 DIAGNOSIS — I10 PRIMARY HYPERTENSION: ICD-10-CM

## 2022-11-08 DIAGNOSIS — I50.22 CHRONIC SYSTOLIC CONGESTIVE HEART FAILURE: ICD-10-CM

## 2022-11-08 DIAGNOSIS — I25.10 CORONARY ARTERY DISEASE INVOLVING NATIVE CORONARY ARTERY OF NATIVE HEART WITHOUT ANGINA PECTORIS: ICD-10-CM

## 2022-11-08 DIAGNOSIS — I48.0 PAROXYSMAL ATRIAL FIBRILLATION: Primary | ICD-10-CM

## 2022-11-08 DIAGNOSIS — I48.21 PERMANENT ATRIAL FIBRILLATION: ICD-10-CM

## 2022-11-08 PROCEDURE — 93000 ELECTROCARDIOGRAM COMPLETE: CPT

## 2022-11-08 PROCEDURE — 99213 OFFICE O/P EST LOW 20 MIN: CPT

## 2022-11-08 RX ORDER — BENZONATATE 200 MG/1
CAPSULE ORAL 2 TIMES DAILY PRN
COMMUNITY
Start: 2022-10-06

## 2022-11-08 RX ORDER — COLCHICINE 0.6 MG/1
TABLET ORAL DAILY PRN
COMMUNITY
Start: 2022-10-20

## 2022-11-08 NOTE — PROGRESS NOTES
Subjective:        Marcus Simon is a 57 y.o. male who here for follow up    Chief Complaint   Patient presents with   • Hospital Follow Up Visit     CATH       HPI    Marcus Simon is a 57 year old male with cardiomyopathy, hypertension, atrial fibrillation, CHF, hyperlipidemia, CAD. He follows up in office today s/p cath 10/19/22 early atherosclerotic plaque otherwise normal coronaries, LV function lower limit of normal.  Echo 9/21/22 ef 40%, full report as below.       The following portions of the patient's history were reviewed and updated as appropriate: allergies, current medications, past family history, past medical history, past social history, past surgical history and problem list.    Past Medical History:   Diagnosis Date   • Bulging disc    • CAD (coronary artery disease)    • CHF (congestive heart failure) ([facility])    • DDD (degenerative disc disease)    • HLD (hyperlipidemia)    • HTN (hypertension)    • Hypothyroidism    • MI (myocardial infarction) ([facility])    • Osteoarthritis    • PAF (paroxysmal atrial fibrillation) ([facility])    • Peptic ulcer    • SOB (shortness of breath)    • Transient cerebral ischemia          reports that he has never smoked. He has never used smokeless tobacco. He reports that he does not drink alcohol and does not use drugs.     Family History   Problem Relation Age of Onset   • Heart attack Mother    • No Known Problems Father    • No Known Problems Sister    • No Known Problems Brother    • No Known Problems Maternal Aunt    • No Known Problems Maternal Uncle    • No Known Problems Paternal Aunt    • No Known Problems Paternal Uncle    • No Known Problems Maternal Grandmother    • No Known Problems Maternal Grandfather    • No Known Problems Paternal Grandmother    • No Known Problems Paternal Grandfather        ROS     Review of Systems  Constitutional: No wt loss, fever, fatigue  Gastrointestinal: No nausea, abdominal pain  Behavioral/Psych: No insomnia or  anxiety  Cardiovascular no chest pain or shortness of breath      Objective:           Vitals and nursing note reviewed.   Constitutional:       Appearance: Well-developed.   HENT:      Head: Normocephalic.      Right Ear: External ear normal.      Left Ear: External ear normal.   Neck:      Vascular: No JVD.   Pulmonary:      Effort: Pulmonary effort is normal. No respiratory distress.      Breath sounds: Normal breath sounds. No stridor. No rales.   Cardiovascular:      Normal rate. Irregularly irregular rhythm.      No gallop.   Pulses:     Intact distal pulses.   Abdominal:      General: Bowel sounds are normal. There is no distension.      Palpations: Abdomen is soft.      Tenderness: There is no abdominal tenderness. There is no guarding.   Musculoskeletal: Normal range of motion.         General: No tenderness.      Cervical back: Normal range of motion. Skin:     General: Skin is warm.   Neurological:      Mental Status: Alert and oriented to person, place, and time.      Deep Tendon Reflexes: Reflexes are normal and symmetric.   Psychiatric:         Judgment: Judgment normal.           ECG 12 Lead    Date/Time: 11/8/2022 2:18 PM  Performed by: Delrois Lund APRN  Authorized by: Deloris Lund APRN   Comparison: compared with previous ECG from 11/8/2022  Similar to previous ECG  Rhythm: atrial fibrillation  Rate: normal  BPM: 96  ST Flattening: all    Clinical impression: abnormal EKG          Interpretation Summary    • Estimated left ventricular EF = 40% Left ventricular systolic function is mildly decreased.  • Left ventricular diastolic function was indeterminate.  • Left ventricular wall thickness is consistent with moderate concentric hypertrophy.  • The right ventricular cavity is borderline dilated.  • The right atrial cavity is mildly dilated.  • Mildly reduced right ventricular systolic function noted.  • Estimated right ventricular systolic pressure from tricuspid regurgitation is normal  (<35 mmHg).  • The following left ventricular wall segments are hypokinetic: mid anterior, apical anterior, basal anterolateral, mid anterolateral, apical lateral, basal inferolateral, mid inferolateral, apical inferior, mid inferior, apical septal, basal inferoseptal, mid inferoseptal, apex hypokinetic, mid anteroseptal, basal anterior, basal inferior and basal inferoseptal.  Conclusion       •  Successful right and left coronary angiogram and LV gram  •  Early atherosclerotic plaque otherwise normal coronary arteries  •  LV function at the lower limit of normal        Current Outpatient Medications:   •  amLODIPine-benazepril (LOTREL) 10-20 MG per capsule, Take 1 capsule by mouth Daily., Disp: , Rfl:   •  baclofen (LIORESAL) 10 MG tablet, Take 10 mg by mouth 3 (Three) Times a Day., Disp: , Rfl:   •  benzonatate (TESSALON) 200 MG capsule, , Disp: , Rfl:   •  colchicine 0.6 MG tablet, , Disp: , Rfl:   •  ferrous gluconate (FERGON) 324 MG tablet, , Disp: , Rfl:   •  furosemide (LASIX) 20 MG tablet, Take 0.5 tablets by mouth Daily., Disp: 30 tablet, Rfl: 6  •  gabapentin (NEURONTIN) 300 MG capsule, Daily., Disp: , Rfl:   •  levothyroxine (SYNTHROID, LEVOTHROID) 150 MCG tablet, Take 150 mcg by mouth Daily., Disp: , Rfl:   •  metoprolol succinate XL (TOPROL-XL) 100 MG 24 hr tablet, Take 100 mg by mouth Daily., Disp: , Rfl:   •  Vitamin D, Cholecalciferol, (CHOLECALCIFEROL) 10 MCG (400 UNIT) tablet, Take 1 tablet by mouth Daily., Disp: , Rfl:   •  vitamin E 100 UNIT capsule, Take 1 capsule by mouth Daily., Disp: , Rfl:   •  warfarin (COUMADIN) 5 MG tablet, Take 5 mg by mouth Daily., Disp: , Rfl:      Assessment:        Patient Active Problem List   Diagnosis   • Dyspnea on exertion   • Congestive heart failure (HCC)   • Cardiomyopathy (HCC)   • Hypertension   • Spinal stenosis of lumbar region   • Morbid (severe) obesity due to excess calories (HCC)   • Old myocardial infarction   • Osteoarthritis of lumbar spine with  myelopathy   • Hypothyroidism   • Hyperlipidemia   • Chronic atrial fibrillation (HCC)   • Long term current use of anticoagulant   • Coronary artery disease involving native coronary artery of native heart without angina pectoris   • Permanent atrial fibrillation (HCC)   • Abnormal nuclear stress test               Plan:   1. Permanent atrial fibrillation: feels tired and fatigued all the time, would like to speak follow up with EP.     2. HFrEF: euvolemic on exam today, no weight gain. Continue gdmt    3. Coronary artery disease: cath with ealry atherosclerotic plaque    4. Hypertension: controlled on current regimen             No diagnosis found.    There are no diagnoses linked to this encounter.    COUNSELING: alyssa Browneling was given to patient for the following topics: diagnostic results, risk factor reductions, impressions, risks and benefits of treatment options and importance of treatment compliance .       SMOKING COUNSELING:denies    Request referral to ep    Sincerely,   JOESPH Hwang  [facility]  11/08/22  14:17 EST    EMR Dragon/Transcription disclaimer:   Much of this encounter note is an electronic transcription/translation of spoken language to printed text. The electronic translation of spoken language may permit erroneous, or at times, nonsensical words or phrases to be inadvertently transcribed; Although I have reviewed the note for such errors, some may still exist.

## 2022-11-21 ENCOUNTER — OFFICE (OUTPATIENT)
Dept: URBAN - METROPOLITAN AREA CLINIC 76 | Facility: CLINIC | Age: 57
End: 2022-11-21
Payer: COMMERCIAL

## 2022-11-21 VITALS
SYSTOLIC BLOOD PRESSURE: 128 MMHG | HEIGHT: 76 IN | DIASTOLIC BLOOD PRESSURE: 82 MMHG | HEART RATE: 117 BPM | OXYGEN SATURATION: 97 % | WEIGHT: 315 LBS

## 2022-11-21 DIAGNOSIS — R10.11 RIGHT UPPER QUADRANT PAIN: ICD-10-CM

## 2022-11-21 DIAGNOSIS — K70.30 ALCOHOLIC CIRRHOSIS OF LIVER WITHOUT ASCITES: ICD-10-CM

## 2022-11-21 DIAGNOSIS — Z86.010 PERSONAL HISTORY OF COLONIC POLYPS: ICD-10-CM

## 2022-11-21 DIAGNOSIS — F10.10 ALCOHOL ABUSE, UNCOMPLICATED: ICD-10-CM

## 2022-11-21 PROCEDURE — 99204 OFFICE O/P NEW MOD 45 MIN: CPT | Performed by: INTERNAL MEDICINE

## 2022-12-27 ENCOUNTER — OFFICE VISIT (OUTPATIENT)
Dept: CARDIOLOGY | Facility: CLINIC | Age: 57
End: 2022-12-27
Payer: COMMERCIAL

## 2022-12-27 VITALS
SYSTOLIC BLOOD PRESSURE: 144 MMHG | WEIGHT: 315 LBS | DIASTOLIC BLOOD PRESSURE: 82 MMHG | BODY MASS INDEX: 38.36 KG/M2 | HEART RATE: 152 BPM | HEIGHT: 76 IN

## 2022-12-27 DIAGNOSIS — I48.19 ATRIAL FIBRILLATION, PERSISTENT: Primary | ICD-10-CM

## 2022-12-27 PROCEDURE — 93000 ELECTROCARDIOGRAM COMPLETE: CPT | Performed by: INTERNAL MEDICINE

## 2022-12-27 PROCEDURE — 99203 OFFICE O/P NEW LOW 30 MIN: CPT | Performed by: INTERNAL MEDICINE

## 2023-01-31 NOTE — PROGRESS NOTES
Date of Office Visit: 2022  Encounter Provider: Ben Adams MD  Place of Service: [facility] CARDIOLOGY  Patient Name: Marcus Simon  :1965    Chief Complaint   Patient presents with   • Atrial Fibrillation     New Patient Consult   :     HPI: Marcus Simon is a 57 y.o. male who presents today for persistent atrial fibrillation.     He has a history of coronary artery disease and cardiomyopathy    He was recently found to be in atrial fibrillation.     He notes symptoms of increased fatigue.     He has not had previous antiarrhythmic treatment or cardioversion.           Past Medical History:   Diagnosis Date   • Bulging disc    • CAD (coronary artery disease)    • CHF (congestive heart failure) ([facility])    • DDD (degenerative disc disease)    • HLD (hyperlipidemia)    • HTN (hypertension)    • Hypothyroidism    • MI (myocardial infarction) ([facility])    • Osteoarthritis    • PAF (paroxysmal atrial fibrillation) ([facility])    • Peptic ulcer    • SOB (shortness of breath)    • Transient cerebral ischemia        Past Surgical History:   Procedure Laterality Date   • CARDIAC CATHETERIZATION     • CARDIAC CATHETERIZATION N/A 10/19/2022    Procedure: Left Heart Cath;  Surgeon: Ron Cowan MD;  Location: [facility] CATH INVASIVE LOCATION;  Service: Cardiology;  Laterality: N/A;   • CARDIAC CATHETERIZATION N/A 10/19/2022    Procedure: Left ventriculography;  Surgeon: Ron Cowan MD;  Location: [facility] CATH INVASIVE LOCATION;  Service: Cardiology;  Laterality: N/A;   • CARDIAC CATHETERIZATION N/A 10/19/2022    Procedure: Coronary angiography;  Surgeon: Ron Cowan MD;  Location: [facility] CATH INVASIVE LOCATION;  Service: Cardiology;  Laterality: N/A;   • CHOLECYSTECTOMY     • GASTRIC RESTRICTION SURGERY     • HAND SURGERY     • SHOULDER SURGERY         Social History     Socioeconomic History   • Marital status: Unknown   Tobacco Use   • Smoking status: Never   •  Smokeless tobacco: Never   Vaping Use   • Vaping Use: Never used   Substance and Sexual Activity   • Alcohol use: No   • Drug use: No   • Sexual activity: Yes       Family History   Problem Relation Age of Onset   • Heart attack Mother    • No Known Problems Father    • No Known Problems Sister    • No Known Problems Brother    • No Known Problems Maternal Aunt    • No Known Problems Maternal Uncle    • No Known Problems Paternal Aunt    • No Known Problems Paternal Uncle    • No Known Problems Maternal Grandmother    • No Known Problems Maternal Grandfather    • No Known Problems Paternal Grandmother    • No Known Problems Paternal Grandfather        Review of Systems   Constitutional: Positive for malaise/fatigue.   Cardiovascular: Negative.    Respiratory: Negative.    Gastrointestinal: Negative.        Allergies   Allergen Reactions   • Penicillins          Current Outpatient Medications:   •  amLODIPine-benazepril (LOTREL) 10-20 MG per capsule, Take 1 capsule by mouth Daily., Disp: , Rfl:   •  baclofen (LIORESAL) 10 MG tablet, Take 10 mg by mouth 3 (Three) Times a Day As Needed., Disp: , Rfl:   •  benzonatate (TESSALON) 200 MG capsule, 2 (Two) Times a Day As Needed., Disp: , Rfl:   •  colchicine 0.6 MG tablet, Daily As Needed (GOUT)., Disp: , Rfl:   •  ferrous gluconate (FERGON) 324 MG tablet, Take 324 mg by mouth Daily With Breakfast., Disp: , Rfl:   •  furosemide (LASIX) 20 MG tablet, Take 0.5 tablets by mouth Daily. (Patient taking differently: Take 20 mg by mouth 2 (Two) Times a Day.), Disp: 30 tablet, Rfl: 6  •  gabapentin (NEURONTIN) 300 MG capsule, Take 300 mg by mouth 2 (Two) Times a Day., Disp: , Rfl:   •  levothyroxine (SYNTHROID, LEVOTHROID) 150 MCG tablet, Take 150 mcg by mouth Daily., Disp: , Rfl:   •  metoprolol succinate XL (TOPROL-XL) 100 MG 24 hr tablet, Take 100 mg by mouth Daily., Disp: , Rfl:   •  Vitamin D, Cholecalciferol, (CHOLECALCIFEROL) 10 MCG (400 UNIT) tablet, Take 1 tablet by mouth  "Daily., Disp: , Rfl:   •  vitamin E 100 UNIT capsule, Take 1 capsule by mouth Daily., Disp: , Rfl:   •  warfarin (COUMADIN) 5 MG tablet, Take 5 mg by mouth Daily., Disp: , Rfl:       Objective:     Vitals:    12/27/22 1203   BP: 144/82   Pulse: (!) 152   Weight: (!) 167 kg (369 lb)   Height: 193 cm (76\")     Body mass index is 44.92 kg/m².    PHYSICAL EXAM:    Vitals and nursing note reviewed.   Constitutional:       General: Not in acute distress.  Pulmonary:      Effort: Pulmonary effort is normal. No respiratory distress.   Cardiovascular:      Normal rate. Irregular rhythm.   Skin:     General: Skin is warm and dry.   Neurological:      Mental Status: Alert and oriented to person, place, and time.   Psychiatric:         Behavior: Behavior normal.         Thought Content: Thought content normal.         Judgment: Judgment normal.             ECG 12 Lead    Date/Time: 12/27/2022 9:39 PM  Performed by: Ben Adams MD  Authorized by: Ben Adams MD   Comparison: compared with previous ECG from 11/8/2022  Rhythm: atrial fibrillation              Assessment:       Diagnosis Plan   1. Atrial fibrillation, persistent (HCC)  Cardioversion External in Cardiology Department             Plan:       We discussed options for rhythm control.  He is symptomatic and has poor rate control    We discussed ablation, antiarrhythmic treatment, and cardioversion.     We discussed lifestyle management including weight loss.     He wishes to proceed with ablation.  He will need three weeks of therapeutic INR 2-3.     As always, it has been a pleasure to participate in your patient's care.      Sincerely,         Ben Adams MD  "

## 2023-02-08 DIAGNOSIS — I48.19 ATRIAL FIBRILLATION, PERSISTENT: Primary | ICD-10-CM

## 2023-03-01 ENCOUNTER — TELEPHONE (OUTPATIENT)
Dept: CARDIOLOGY | Facility: CLINIC | Age: 58
End: 2023-03-01
Payer: COMMERCIAL

## 2023-03-01 NOTE — TELEPHONE ENCOUNTER
Caller: Marcus Simon    Relationship: Self    Best call back number: [phone]    What is the best time to reach you: ANYTIME    Who are you requesting to speak with (clinical staff, provider,  specific staff member): ZOHREH      What was the call regarding:  PATIENT WAS TOLD TO CALLBACK WITH INFORMATION. YET PATIENT ATTEMPTED TO REACHED EXTENSION AND THERE WAS NO RESPONSE. PATIENT STATES QING HRARIS IS PERFORMING IRL.    Do you require a callback: YES

## 2023-03-08 ENCOUNTER — HOSPITAL ENCOUNTER (INPATIENT)
Facility: HOSPITAL | Age: 58
LOS: 2 days | Discharge: HOME | DRG: 308 | End: 2023-03-10
Attending: INTERNAL MEDICINE | Admitting: INTERNAL MEDICINE
Payer: COMMERCIAL

## 2023-03-08 DIAGNOSIS — I48.19 ATRIAL FIBRILLATION, PERSISTENT: ICD-10-CM

## 2023-03-08 LAB
ANION GAP SERPL CALCULATED.3IONS-SCNC: 6.8 MMOL/L (ref 5–15)
ANION GAP SERPL CALCULATED.3IONS-SCNC: 9.3 MMOL/L (ref 5–15)
BUN SERPL-MCNC: 15 MG/DL (ref 6–20)
BUN SERPL-MCNC: 15 MG/DL (ref 6–20)
BUN/CREAT SERPL: 19.7 (ref 7–25)
BUN/CREAT SERPL: 24.2 (ref 7–25)
CALCIUM SPEC-SCNC: 9.5 MG/DL (ref 8.6–10.5)
CALCIUM SPEC-SCNC: 9.6 MG/DL (ref 8.6–10.5)
CHLORIDE SERPL-SCNC: 101 MMOL/L (ref 98–107)
CHLORIDE SERPL-SCNC: 103 MMOL/L (ref 98–107)
CO2 SERPL-SCNC: 26.7 MMOL/L (ref 22–29)
CO2 SERPL-SCNC: 29.2 MMOL/L (ref 22–29)
CREAT SERPL-MCNC: 0.62 MG/DL (ref 0.76–1.27)
CREAT SERPL-MCNC: 0.76 MG/DL (ref 0.76–1.27)
DEPRECATED RDW RBC AUTO: 49.8 FL (ref 37–54)
EGFRCR SERPLBLD CKD-EPI 2021: 104.8 ML/MIN/1.73
EGFRCR SERPLBLD CKD-EPI 2021: 111.5 ML/MIN/1.73
ERYTHROCYTE [DISTWIDTH] IN BLOOD BY AUTOMATED COUNT: 13.2 % (ref 12.3–15.4)
GLUCOSE SERPL-MCNC: 126 MG/DL (ref 65–99)
GLUCOSE SERPL-MCNC: 169 MG/DL (ref 65–99)
HCT VFR BLD AUTO: 46.8 % (ref 37.5–51)
HGB BLD-MCNC: 16.1 G/DL (ref 13–17.7)
INR PPP: 2.99 (ref 0.9–1.1)
MAGNESIUM SERPL-MCNC: 1.9 MG/DL (ref 1.6–2.6)
MAGNESIUM SERPL-MCNC: 2 MG/DL (ref 1.6–2.6)
MCH RBC QN AUTO: 35.2 PG (ref 26.6–33)
MCHC RBC AUTO-ENTMCNC: 34.4 G/DL (ref 31.5–35.7)
MCV RBC AUTO: 102.4 FL (ref 79–97)
PLATELET # BLD AUTO: 163 10*3/MM3 (ref 140–450)
PMV BLD AUTO: 9.7 FL (ref 6–12)
POTASSIUM SERPL-SCNC: 4.1 MMOL/L (ref 3.5–5.2)
POTASSIUM SERPL-SCNC: 4.4 MMOL/L (ref 3.5–5.2)
PROTHROMBIN TIME: 31.5 SECONDS (ref 11.7–14.2)
QT INTERVAL: 331 MS
QT INTERVAL: 343 MS
RBC # BLD AUTO: 4.57 10*6/MM3 (ref 4.14–5.8)
SODIUM SERPL-SCNC: 137 MMOL/L (ref 136–145)
SODIUM SERPL-SCNC: 139 MMOL/L (ref 136–145)
WBC NRBC COR # BLD: 5.85 10*3/MM3 (ref 3.4–10.8)

## 2023-03-08 PROCEDURE — 85027 COMPLETE CBC AUTOMATED: CPT

## 2023-03-08 PROCEDURE — 93005 ELECTROCARDIOGRAM TRACING: CPT | Performed by: INTERNAL MEDICINE

## 2023-03-08 PROCEDURE — 80048 BASIC METABOLIC PNL TOTAL CA: CPT

## 2023-03-08 PROCEDURE — 93010 ELECTROCARDIOGRAM REPORT: CPT | Performed by: INTERNAL MEDICINE

## 2023-03-08 PROCEDURE — 93005 ELECTROCARDIOGRAM TRACING: CPT

## 2023-03-08 PROCEDURE — 83735 ASSAY OF MAGNESIUM: CPT

## 2023-03-08 PROCEDURE — S0260 H&P FOR SURGERY: HCPCS | Performed by: INTERNAL MEDICINE

## 2023-03-08 PROCEDURE — 85610 PROTHROMBIN TIME: CPT

## 2023-03-08 RX ORDER — MAGNESIUM SULFATE HEPTAHYDRATE 40 MG/ML
2 INJECTION, SOLUTION INTRAVENOUS AS NEEDED
Status: DISCONTINUED | OUTPATIENT
Start: 2023-03-08 | End: 2023-03-10 | Stop reason: HOSPADM

## 2023-03-08 RX ORDER — POTASSIUM CHLORIDE 1.5 G/1.77G
40 POWDER, FOR SOLUTION ORAL AS NEEDED
Status: DISCONTINUED | OUTPATIENT
Start: 2023-03-08 | End: 2023-03-10 | Stop reason: HOSPADM

## 2023-03-08 RX ORDER — FUROSEMIDE 20 MG/1
10 TABLET ORAL DAILY
Status: DISCONTINUED | OUTPATIENT
Start: 2023-03-09 | End: 2023-03-10 | Stop reason: HOSPADM

## 2023-03-08 RX ORDER — BACLOFEN 10 MG/1
10 TABLET ORAL 3 TIMES DAILY PRN
Status: DISCONTINUED | OUTPATIENT
Start: 2023-03-08 | End: 2023-03-10 | Stop reason: HOSPADM

## 2023-03-08 RX ORDER — MAGNESIUM SULFATE HEPTAHYDRATE 40 MG/ML
4 INJECTION, SOLUTION INTRAVENOUS AS NEEDED
Status: DISCONTINUED | OUTPATIENT
Start: 2023-03-08 | End: 2023-03-10 | Stop reason: HOSPADM

## 2023-03-08 RX ORDER — LISINOPRIL 20 MG/1
20 TABLET ORAL
Status: DISCONTINUED | OUTPATIENT
Start: 2023-03-09 | End: 2023-03-10 | Stop reason: HOSPADM

## 2023-03-08 RX ORDER — LEVOTHYROXINE SODIUM 0.15 MG/1
150 TABLET ORAL
Status: DISCONTINUED | OUTPATIENT
Start: 2023-03-09 | End: 2023-03-10 | Stop reason: HOSPADM

## 2023-03-08 RX ORDER — FERROUS SULFATE 325(65) MG
325 TABLET ORAL
Status: DISCONTINUED | OUTPATIENT
Start: 2023-03-09 | End: 2023-03-10 | Stop reason: HOSPADM

## 2023-03-08 RX ORDER — METOPROLOL SUCCINATE 100 MG/1
100 TABLET, EXTENDED RELEASE ORAL DAILY
Status: DISCONTINUED | OUTPATIENT
Start: 2023-03-09 | End: 2023-03-10 | Stop reason: HOSPADM

## 2023-03-08 RX ORDER — NITROGLYCERIN 0.4 MG/1
0.4 TABLET SUBLINGUAL
Status: DISCONTINUED | OUTPATIENT
Start: 2023-03-08 | End: 2023-03-10 | Stop reason: HOSPADM

## 2023-03-08 RX ORDER — WARFARIN SODIUM 7.5 MG/1
7.5 TABLET ORAL
Status: DISCONTINUED | OUTPATIENT
Start: 2023-03-09 | End: 2023-03-09

## 2023-03-08 RX ORDER — WARFARIN SODIUM 3 MG/1
1.5 TABLET ORAL
Status: DISCONTINUED | OUTPATIENT
Start: 2023-03-08 | End: 2023-03-08

## 2023-03-08 RX ORDER — GABAPENTIN 300 MG/1
300 CAPSULE ORAL 2 TIMES DAILY
Status: DISCONTINUED | OUTPATIENT
Start: 2023-03-08 | End: 2023-03-10 | Stop reason: HOSPADM

## 2023-03-08 RX ORDER — DOFETILIDE 0.5 MG/1
500 CAPSULE ORAL EVERY 12 HOURS
Status: DISCONTINUED | OUTPATIENT
Start: 2023-03-08 | End: 2023-03-10 | Stop reason: HOSPADM

## 2023-03-08 RX ORDER — BENZONATATE 100 MG/1
100 CAPSULE ORAL 2 TIMES DAILY PRN
Status: DISCONTINUED | OUTPATIENT
Start: 2023-03-08 | End: 2023-03-10 | Stop reason: HOSPADM

## 2023-03-08 RX ORDER — POTASSIUM CHLORIDE 750 MG/1
40 TABLET, FILM COATED, EXTENDED RELEASE ORAL AS NEEDED
Status: DISCONTINUED | OUTPATIENT
Start: 2023-03-08 | End: 2023-03-10 | Stop reason: HOSPADM

## 2023-03-08 RX ORDER — AMLODIPINE BESYLATE 10 MG/1
10 TABLET ORAL
Status: DISCONTINUED | OUTPATIENT
Start: 2023-03-09 | End: 2023-03-10 | Stop reason: HOSPADM

## 2023-03-08 RX ADMIN — DOFETILIDE 500 MCG: 0.5 CAPSULE ORAL at 20:02

## 2023-03-08 RX ADMIN — GABAPENTIN 300 MG: 300 CAPSULE ORAL at 20:02

## 2023-03-08 NOTE — PROGRESS NOTES
[facility] Clinical Pharmacy Services: Tikosyn (Dofetilide) Consult    Pharmacy has been consulted to look over Marcus Simon's profile to review appropriateness of starting tikosyn inpatient based on renal function and drug interactions per Noah PINK's request.    Initiation   Patient's creatinine clearance has been hand-calculated using actual body weight: 293 mL/min    CrCl (mL/min) = (140 - AGE) X Actual Body Weight (kg)        X 0.85 (for females)                                                              72 X SCr (mg/dL)       I verified the dose based on CrCl:   CrCl >60 mL/minute: Initial: 500 mcg twice daily.    I evaluated the timing and interval between first and second doses to insure the space between the 2 doses is not <10 hours.    The baseline QTc has been reviewed and confirmed that QTc < 440 msec (500 msec in patients with ventricular conduction abnormality) QTc 470     Drug Interactions    CONTRAINDICATED WARNING Other Antiarrhythmics   verapamil   cimetidine   ketoconazole   trimethoprim   cotrimoxazole   hydrochlorothiazide   prochlorperazine   megestrol   dolutegravir   dronedarone  phenothiazines   amiodarone   tricyclic antidepressants   bepridil   some macrolide antibiotics   SSRIs   azole antifungals   fluoroquinolone antibiotics   protease inhibitors   potassium-depleting diuretics  procainamide   disopyramide   lidocaine   mexilitene   flecanide   propafenone   ibutilide   sotalol   amiodarone         Patient's home medications: Vitamin D (Cholecalciferol), amLODIPine-benazepril, baclofen, benzonatate, colchicine, ferrous gluconate, furosemide, gabapentin, levothyroxine, metoprolol succinate XL, vitamin E, and warfarin    Based on the known drug interactions and their above med list, the appropriate changes have been made or were discussed with the ordering provider.  Furosemide - may increase Qtc-prolonging effect of Tikosyn - Benefit outweighs risk    Assessment/Plan    1.  Based on patient's renal function and baseline EKG, dosing guidelines recommend a starting dose of tikosyn 500 mcg PO every 12 hours. This dose may change based on the EKG 2-3 hrs after initial dose, or per physician's judgement. QTc interval will continue to be monitored in-hospital after all additional doses to see if QTc has increased. If the QTc has increased by greater than 15%, or if the QTc is greater than 500 msec (550 msec for patients with ventricular conduction abnormalities), dose reduction should be considered. If patient does not convert to normal sinus rhythm within 24 hours of initiation of tikosyn therapy, electrical conversion may be scheduled for the patient.    Dose Adjustments based on prolonged QTc  Starting Dose Based on CrCl: Then the Adjusted Dose (for QT prolongation) is:   500mcg PO BID (greater than 60ml/min) 250mcg PO BID   250mcg PO BID (40 - 60ml/min) 125mcg PO BID   125mcg PO BID (20 - 39.9ml/min) 125mcg PO Daily     2. Ensure serum potassium and magnesium are maintained within normal range while on tikosyn to reduce risk of torsade de pointes     Potassium/Magnesium is currently:   Potassium   Date Value Ref Range Status   03/08/2023 4.1 3.5 - 5.2 mmol/L Final   01/02/2020 4.5 3.5 - 5.1 mmol/L Final     Magnesium   Date Value Ref Range Status   03/08/2023 1.9 1.6 - 2.6 mg/dL Final     Both K and Mag are within normal limits. Potassium and Magnesium protocols have been ordered on the patient's profile to replace as needed during hospitalization.    Thank you for allowing me to participate in your patient's care. Please call pharmacy with any questions or concerns.    Yasmin Wilson, [facility]  Clinical Pharmacist

## 2023-03-08 NOTE — PROGRESS NOTES
[facility] Clinical Pharmacy Services: Tikosyn (Dofetilide) Education    Marcus Simon was initiated on tikosyn for atrial fibrillation. Counseling points included the followin) Explained indication and need for tikosyn for atrial fibrillation, and the testing and labs needed during the initiation phase (EKGs, potassium, magnesium, renal function).  2) Went over dosing and frequency of this medication, stressing importance of taking medication every 12 hours and not missing or doubling up on doses.  3) Discussed any administration, storage, and monitoring instructions with tikosyn.  4) Discussed all important drug interactions, including antibiotics, antiemetics, over-the-counter medications and supplements.  5) Explained possible side effects for tikosyn.  6) Instructed the patient not to begin or discontinue any medications without informing his/her physician/pharmacist.    Patient expressed understanding and had no further questions.      Emily Combs, Pharm.D., [facility]   Clinical Staff Pharmacist   Phone Extension #[phone]

## 2023-03-08 NOTE — PLAN OF CARE
Goal Outcome Evaluation:  Plan of Care Reviewed With: patient           Outcome Evaluation: Direct admit for tikosyn therapy. Currently Afib on tele, HR 120s-160s. All other VSS, on RA. Ambulating independently. Tikosyn ordered for 2000/0800. Wife and pt updated on POC at bedside.

## 2023-03-09 ENCOUNTER — APPOINTMENT (OUTPATIENT)
Dept: CARDIOLOGY | Facility: HOSPITAL | Age: 58
DRG: 308 | End: 2023-03-09
Payer: COMMERCIAL

## 2023-03-09 PROBLEM — I48.19 PERSISTENT ATRIAL FIBRILLATION (HCC): Status: ACTIVE | Noted: 2023-03-09

## 2023-03-09 LAB
ANION GAP SERPL CALCULATED.3IONS-SCNC: 9 MMOL/L (ref 5–15)
BUN SERPL-MCNC: 15 MG/DL (ref 6–20)
BUN/CREAT SERPL: 25 (ref 7–25)
CALCIUM SPEC-SCNC: 9 MG/DL (ref 8.6–10.5)
CHLORIDE SERPL-SCNC: 103 MMOL/L (ref 98–107)
CO2 SERPL-SCNC: 28 MMOL/L (ref 22–29)
CREAT SERPL-MCNC: 0.6 MG/DL (ref 0.76–1.27)
EGFRCR SERPLBLD CKD-EPI 2021: 112.6 ML/MIN/1.73
GLUCOSE SERPL-MCNC: 121 MG/DL (ref 65–99)
INR PPP: 2.88 (ref 0.9–1.1)
MAXIMAL PREDICTED HEART RATE: 163 BPM
POTASSIUM SERPL-SCNC: 4 MMOL/L (ref 3.5–5.2)
PROTHROMBIN TIME: 30.6 SECONDS (ref 11.7–14.2)
QT INTERVAL: 369 MS
QT INTERVAL: 370 MS
QT INTERVAL: 381 MS
QT INTERVAL: 388 MS
SODIUM SERPL-SCNC: 140 MMOL/L (ref 136–145)
STRESS TARGET HR: 139 BPM
WHOLE BLOOD HOLD SPECIMEN: NORMAL

## 2023-03-09 PROCEDURE — 80048 BASIC METABOLIC PNL TOTAL CA: CPT

## 2023-03-09 PROCEDURE — 92960 CARDIOVERSION ELECTRIC EXT: CPT

## 2023-03-09 PROCEDURE — 93010 ELECTROCARDIOGRAM REPORT: CPT | Performed by: INTERNAL MEDICINE

## 2023-03-09 PROCEDURE — 93005 ELECTROCARDIOGRAM TRACING: CPT

## 2023-03-09 PROCEDURE — 85610 PROTHROMBIN TIME: CPT

## 2023-03-09 PROCEDURE — 93005 ELECTROCARDIOGRAM TRACING: CPT | Performed by: INTERNAL MEDICINE

## 2023-03-09 PROCEDURE — 5A2204Z RESTORATION OF CARDIAC RHYTHM, SINGLE: ICD-10-PCS | Performed by: INTERNAL MEDICINE

## 2023-03-09 RX ORDER — WARFARIN SODIUM 1 MG/1
1.5 TABLET ORAL
Status: ON HOLD | COMMUNITY
End: 2023-03-10

## 2023-03-09 RX ORDER — WARFARIN SODIUM 3 MG/1
1.5 TABLET ORAL
Status: DISCONTINUED | OUTPATIENT
Start: 2023-03-09 | End: 2023-03-10

## 2023-03-09 RX ORDER — SODIUM CHLORIDE 9 MG/ML
INJECTION, SOLUTION INTRAVENOUS
Status: COMPLETED | OUTPATIENT
Start: 2023-03-09 | End: 2023-03-09

## 2023-03-09 RX ADMIN — SODIUM CHLORIDE 50 ML/HR: 9 INJECTION, SOLUTION INTRAVENOUS at 12:01

## 2023-03-09 RX ADMIN — GABAPENTIN 300 MG: 300 CAPSULE ORAL at 09:58

## 2023-03-09 RX ADMIN — LEVOTHYROXINE SODIUM 150 MCG: 0.15 TABLET ORAL at 05:59

## 2023-03-09 RX ADMIN — Medication 70 MG: at 12:10

## 2023-03-09 RX ADMIN — DOFETILIDE 500 MCG: 0.5 CAPSULE ORAL at 20:00

## 2023-03-09 RX ADMIN — DOFETILIDE 500 MCG: 0.5 CAPSULE ORAL at 08:11

## 2023-03-09 RX ADMIN — WARFARIN 1.5 MG: 3 TABLET ORAL at 18:40

## 2023-03-09 RX ADMIN — GABAPENTIN 300 MG: 300 CAPSULE ORAL at 20:00

## 2023-03-09 RX ADMIN — METOPROLOL SUCCINATE 100 MG: 100 TABLET, EXTENDED RELEASE ORAL at 09:58

## 2023-03-09 RX ADMIN — AMLODIPINE BESYLATE 10 MG: 10 TABLET ORAL at 09:58

## 2023-03-09 RX ADMIN — LISINOPRIL 20 MG: 20 TABLET ORAL at 09:58

## 2023-03-09 RX ADMIN — FUROSEMIDE 10 MG: 20 TABLET ORAL at 09:58

## 2023-03-09 NOTE — PROGRESS NOTES
[facility] Clinical Pharmacy Services: Warfarin Dosing/Monitoring Consult    Marcus Simon is a 58 y.o. male, estimated creatinine clearance is 213.8 mL/min (A) (by C-G formula based on SCr of 0.62 mg/dL (L)). weighing (!) 158 kg (348 lb 3.2 oz).    Results from last 7 days   Lab Units 03/08/23  1533   INR  2.99*   HEMOGLOBIN g/dL 16.1   HEMATOCRIT % 46.8   PLATELETS 10*3/mm3 163     Prior to admission anticoagulation: warfarin 7.5 mg daily    Hospital Anticoagulation:  Consulting provider: Noah PINK  Start date: 3/9  Indication: A Fib - requiring full anticoagulation  Target INR: 2 - 3  Expected duration: tbd   Bridge Therapy: No      Potential food or drug interactions: Levothyroxine - previously tolerated    Education complete?/Date: No; plan for follow up TBD    Assessment/Plan:  Dose: no dose tonight (patient took prior to visit) - will restart home regimen starting tomorrow  Monitor for any signs or symptoms of bleeding  Follow up daily INRs and dose adjustments    Pharmacy will continue to follow until discharge or discontinuation of warfarin.     Yasmin Wilson [facility]  Clinical Pharmacist

## 2023-03-09 NOTE — PROGRESS NOTES
[facility] Clinical Pharmacy Services: Warfarin Dosing/Monitoring Consult    Marcus Simon is a 58 y.o. male, estimated creatinine clearance is 220.9 mL/min (A) (by C-G formula based on SCr of 0.6 mg/dL (L)). weighing (!) 158 kg (348 lb 3.2 oz).    Results from last 7 days   Lab Units 03/09/23  0248 03/08/23  1533   INR  2.88* 2.99*   HEMOGLOBIN g/dL  --  16.1   HEMATOCRIT %  --  46.8   PLATELETS 10*3/mm3  --  163     Prior to admission anticoagulation: Per patient interview, most recent warfarin regimen is 1.5 mg daily (10.5 mg/week). This has been updated on the patient's home med list.    Hospital Anticoagulation:  Consulting provider: JOESPH Mai  Start date: 3/8/23  Indication: Atrial fibrillation  Target INR: 2 - 3  Expected duration: Lifelong  Bridge Therapy: No    Potential food or drug interactions:   • Levothyroxine (minor-home med)-concurrent use increases metabolism of vitamin K-dependent clotting factors, resulting in an increased risk of bleeding.      Education complete?/Date: No; plan for follow up TBD    INR Assessment:  Date INR Dose   3/8 2.99 1.5 mg   3/9 2.88                Assessment/Plan:  1. INR is currently therapeutic today at 2.88. Will continue home dose of 1.5 mg daily. INR will be assessed tomorrow to determine if anything needs to be changed.  2. Monitor for any signs or symptoms of bleeding. H/H is stable at 16.1/46.8-no reports of bleeding.   3. Follow up daily INRs and dose adjustments.    Pharmacy will continue to follow until discharge or discontinuation of warfarin.     Emily Combs, Pharm.D., [facility]   Clinical Pharmacist   Phone Extension #[phone]    ADDENDUM: Patient has been telling staff that he is taking 1.5 mg daily, but he is actually taking 1 and a half of the 5 mg tablets=7.5 mg. JOESPH Mai is aware, and 7.5 mg has been given on 3/10 morning.

## 2023-03-09 NOTE — CASE MANAGEMENT/SOCIAL WORK
Discharge Planning Assessment  [facility]     Patient Name: Marcus Simon  MRN: [id number]  Today's Date: 3/9/2023    Admit Date: 3/8/2023    Plan: Home; follow for cost of Tikosyn   Discharge Needs Assessment     Row Name 03/09/23 1352       Living Environment    People in Home spouse    Current Living Arrangements home    Potentially Unsafe Housing Conditions none    Primary Care Provided by self    Provides Primary Care For no one    Family Caregiver if Needed spouse    Quality of Family Relationships helpful;involved;supportive    Able to Return to Prior Arrangements yes       Resource/Environmental Concerns    Resource/Environmental Concerns none       Transition Planning    Patient/Family Anticipates Transition to home with family    Patient/Family Anticipated Services at Transition none    Transportation Anticipated family or friend will provide       Discharge Needs Assessment    Readmission Within the Last 30 Days no previous admission in last 30 days    Equipment Currently Used at Home none    Concerns to be Addressed no discharge needs identified;denies needs/concerns at this time    Anticipated Changes Related to Illness none    Equipment Needed After Discharge none               Discharge Plan     Row Name 03/09/23 1352       Plan    Plan Home; follow for cost of Tikosyn    Plan Comments CCP met with patient at bedside. CCP role explained and face sheet verified. Patient lives with his wife. Patient is independent with his ADLs and does not use DME. Patient has not used home health or been to SNF. Patient plans to return home at discharge and does not anticipate any discharge needs. Patient’s preferred [facility] is [facility] but was agreeable to meds to beds. Patient’s wife to provide transportation home. CCP will follow for cost of Tikosyn and assist as needed. Gisel RINALDI              Continued Care and Services - Admitted Since 3/8/2023    Coordination has not been started for this  encounter.       Expected Discharge Date and Time     Expected Discharge Date Expected Discharge Time    Mar 10, 2023          Demographic Summary     Row Name 03/09/23 1352       General Information    Admission Type inpatient    Arrived From emergency department    Referral Source admission list    Reason for Consult discharge planning    Preferred Language English               Functional Status     Row Name 03/09/23 1352       Functional Status    Usual Activity Tolerance good    Current Activity Tolerance good       Functional Status, IADL    Medications independent    Meal Preparation independent    Housekeeping independent    Laundry independent    Shopping independent       Mental Status    General Appearance WDL WDL       Mental Status Summary    Recent Changes in Mental Status/Cognitive Functioning no changes               Psychosocial    No documentation.                Abuse/Neglect    No documentation.                Legal    No documentation.                Substance Abuse    No documentation.                Patient Forms    No documentation.                   GENTRY Farr

## 2023-03-09 NOTE — PROGRESS NOTES
Doing well this morning. QT WNL after first dose of tikosyn. Remains in A fib. Will proceed with cardioversion this afternoon. Patient NPO.

## 2023-03-09 NOTE — NURSING NOTE
Pt admitted for tikosyn administration. First dose given. Remains in afib, NPO for cardioversion today. Pt on RA. Up ad ady.

## 2023-03-09 NOTE — H&P
Patient Name: Marcus Simon  Age/Sex: 58 y.o. male  : 1965  MRN: [id number]    Date of Admission: 3/8/2023  Date of Encounter Visit: 23  Encounter Provider: Ben Adams MD  Place of Service: [facility] CARDIOLOGY      Referring Provider: Ben Adams MD  Patient Care Team:  Jackson Vázquez MD as PCP - General  Jackson Vázquez MD as PCP - Family Medicine    Subjective:       Chief Complaint:   Persistent Atrial Fibrillation    History of Present Illness:  Marcus Simon is a 57 y.o. male who presents today for persistent atrial fibrillation.      He has a history of coronary artery disease and cardiomyopathy     He was recently found to be in atrial fibrillation.      He notes symptoms of increased fatigue.      He has not had previous antiarrhythmic treatment or cardioversion.           Past Medical History:  Past Medical History:   Diagnosis Date   • Bulging disc    • CAD (coronary artery disease)    • CHF (congestive heart failure) ([facility])    • DDD (degenerative disc disease)    • HLD (hyperlipidemia)    • HTN (hypertension)    • Hypothyroidism    • MI (myocardial infarction) ([facility])    • Osteoarthritis    • PAF (paroxysmal atrial fibrillation) ([facility])    • Peptic ulcer    • SOB (shortness of breath)    • Transient cerebral ischemia        Past Surgical History:   Procedure Laterality Date   • CARDIAC CATHETERIZATION     • CARDIAC CATHETERIZATION N/A 10/19/2022    Procedure: Left Heart Cath;  Surgeon: Ron Cowan MD;  Location: [facility] CATH INVASIVE LOCATION;  Service: Cardiology;  Laterality: N/A;   • CARDIAC CATHETERIZATION N/A 10/19/2022    Procedure: Left ventriculography;  Surgeon: Ron Cowan MD;  Location[facility] CATH INVASIVE LOCATION;  Service: Cardiology;  Laterality: N/A;   • CARDIAC CATHETERIZATION N/A 10/19/2022    Procedure: Coronary angiography;  Surgeon: Ron Cowan MD;  Location: [facility] CATH INVASIVE  LOCATION;  Service: Cardiology;  Laterality: N/A;   • CHOLECYSTECTOMY     • GASTRIC RESTRICTION SURGERY     • HAND SURGERY     • SHOULDER SURGERY         Home Medications:   Medications Prior to Admission   Medication Sig Dispense Refill Last Dose   • amLODIPine-benazepril (LOTREL) 10-20 MG per capsule Take 1 capsule by mouth Daily.   3/8/2023   • benzonatate (TESSALON) 200 MG capsule 2 (Two) Times a Day As Needed.   3/7/2023   • colchicine 0.6 MG tablet Daily As Needed (GOUT).   Past Week   • ferrous gluconate (FERGON) 324 MG tablet Take 1 tablet by mouth Daily With Breakfast.   3/8/2023   • furosemide (LASIX) 20 MG tablet Take 0.5 tablets by mouth Daily. 30 tablet 6 3/8/2023   • gabapentin (NEURONTIN) 300 MG capsule Take 1 capsule by mouth 2 (Two) Times a Day.   3/8/2023   • levothyroxine (SYNTHROID, LEVOTHROID) 150 MCG tablet Take 1 tablet by mouth Daily.   3/8/2023   • metoprolol succinate XL (TOPROL-XL) 100 MG 24 hr tablet Take 1 tablet by mouth Daily.   3/8/2023   • Vitamin D, Cholecalciferol, (CHOLECALCIFEROL) 10 MCG (400 UNIT) tablet Take 1 tablet by mouth Daily.   3/8/2023   • vitamin E 100 UNIT capsule Take 1 capsule by mouth Daily.   3/8/2023   • warfarin (COUMADIN) 5 MG tablet Take 1.5 tablets by mouth Daily.   3/8/2023   • baclofen (LIORESAL) 10 MG tablet Take 1 tablet by mouth 3 (Three) Times a Day As Needed.   More than a month       Allergies:  Allergies   Allergen Reactions   • Penicillins    • Sulfa Antibiotics Unknown - High Severity       Past Social History:  Social History     Socioeconomic History   • Marital status:    Tobacco Use   • Smoking status: Never   • Smokeless tobacco: Never   Vaping Use   • Vaping Use: Never used   Substance and Sexual Activity   • Alcohol use: No   • Drug use: No   • Sexual activity: Yes        Past Family History:  Family History   Problem Relation Age of Onset   • Heart attack Mother    • No Known Problems Father    • No Known Problems Sister    • No  Known Problems Brother    • No Known Problems Maternal Aunt    • No Known Problems Maternal Uncle    • No Known Problems Paternal Aunt    • No Known Problems Paternal Uncle    • No Known Problems Maternal Grandmother    • No Known Problems Maternal Grandfather    • No Known Problems Paternal Grandmother    • No Known Problems Paternal Grandfather        Review of Systems: All systems reviewed. Pertinent positives identified in HPI. All other systems are negative.     Review of Systems   Constitutional: Negative for malaise/fatigue.   HENT: Negative.    Eyes: Negative.    Cardiovascular: Negative for chest pain, dyspnea on exertion, leg swelling and near-syncope.   Respiratory: Negative for cough and shortness of breath.    Endocrine: Negative.    Hematologic/Lymphatic: Negative.    Skin: Negative.    Musculoskeletal: Negative.    Gastrointestinal: Negative.    Genitourinary: Negative.    Neurological: Negative.  Negative for weakness.   Psychiatric/Behavioral: Negative.    Allergic/Immunologic: Negative.          Objective:     Objective:  Temp:  [98 °F (36.7 °C)-98.2 °F (36.8 °C)] 98.2 °F (36.8 °C)  Heart Rate:  [] 97  Resp:  [20] 20  BP: (114-132)/(64-88) 129/88    Intake/Output Summary (Last 24 hours) at 3/8/2023 2256  Last data filed at 3/8/2023 2100  Gross per 24 hour   Intake 240 ml   Output --   Net 240 ml     Body mass index is 42.38 kg/m².      03/08/23  1431   Weight: (!) 158 kg (348 lb 3.2 oz)           Physical Exam:   Vitals and nursing note reviewed.   Constitutional:       Appearance: Well-developed.   Eyes:      General:         Right eye: No discharge.         Left eye: No discharge.      Conjunctiva/sclera: Conjunctivae normal.   HENT:      Head: Normocephalic and atraumatic.   Neck:      Vascular: No JVD.   Pulmonary:      Effort: No respiratory distress.   Cardiovascular:      Normal rate. Irregular rhythm.   Edema:     Peripheral edema absent.   Abdominal:      General: There is no  distension.      Tenderness: There is no abdominal tenderness.   Musculoskeletal: Normal range of motion.      Cervical back: Neck supple. Neurological:      Cranial Nerves: No cranial nerve deficit.           Lab Review:     Results from last 7 days   Lab Units 03/08/23 1951 03/08/23  1533   SODIUM mmol/L 139 137   POTASSIUM mmol/L 4.4 4.1   CHLORIDE mmol/L 103 101   CO2 mmol/L 26.7 29.2*   BUN mg/dL 15 15   CREATININE mg/dL 0.76 0.62*   GLUCOSE mg/dL 169* 126*   CALCIUM mg/dL 9.6 9.5           Results from last 7 days   Lab Units 03/08/23  1533   WBC 10*3/mm3 5.85   HEMOGLOBIN g/dL 16.1   HEMATOCRIT % 46.8   PLATELETS 10*3/mm3 163     Results from last 7 days   Lab Units 03/08/23  1533   INR  2.99*         Results from last 7 days   Lab Units 03/08/23 1951   MAGNESIUM mg/dL 2.0                       Imaging:    Imaging Results (Most Recent)     None          EKG:   Atrial Fibrillation with rapid ventricular response      Baseline:     I personally viewed and interpreted the patient's EKG/Telemetry tracings.    Assessment:   Assessment & Plan     Persistent Atrial Fibrillation  Systolic Cardiomyopathy    Plan:         We previous discussed options for rhythm control of his atrial fibrillation.     After discussion we elected to pursue ablation.  We discussed the risk of dofetilide therapy.  He was agreeable with proceeding.     Thank you for allowing me to participate in the care of Marcus Simon. Feel free to contact me directly with any further questions or concerns.    Ben Adams MD  03/08/23  22:56 EST

## 2023-03-10 ENCOUNTER — READMISSION MANAGEMENT (OUTPATIENT)
Dept: CALL CENTER | Facility: HOSPITAL | Age: 58
End: 2023-03-10
Payer: COMMERCIAL

## 2023-03-10 ENCOUNTER — APPOINTMENT (OUTPATIENT)
Dept: CARDIOLOGY | Facility: HOSPITAL | Age: 58
DRG: 308 | End: 2023-03-10
Payer: COMMERCIAL

## 2023-03-10 VITALS
DIASTOLIC BLOOD PRESSURE: 97 MMHG | RESPIRATION RATE: 18 BRPM | BODY MASS INDEX: 38.36 KG/M2 | HEART RATE: 110 BPM | HEIGHT: 76 IN | SYSTOLIC BLOOD PRESSURE: 125 MMHG | OXYGEN SATURATION: 94 % | WEIGHT: 315 LBS | TEMPERATURE: 98.9 F

## 2023-03-10 PROBLEM — I50.21 ACUTE SYSTOLIC HEART FAILURE: Status: ACTIVE | Noted: 2023-03-10

## 2023-03-10 PROBLEM — I50.22 CHRONIC SYSTOLIC HEART FAILURE: Status: ACTIVE | Noted: 2023-03-10

## 2023-03-10 PROBLEM — I50.23 ACUTE ON CHRONIC SYSTOLIC HEART FAILURE: Status: ACTIVE | Noted: 2023-03-10

## 2023-03-10 LAB
ANION GAP SERPL CALCULATED.3IONS-SCNC: 11.3 MMOL/L (ref 5–15)
BUN SERPL-MCNC: 14 MG/DL (ref 6–20)
BUN/CREAT SERPL: 20.6 (ref 7–25)
CALCIUM SPEC-SCNC: 9.3 MG/DL (ref 8.6–10.5)
CHLORIDE SERPL-SCNC: 102 MMOL/L (ref 98–107)
CO2 SERPL-SCNC: 26.7 MMOL/L (ref 22–29)
CREAT SERPL-MCNC: 0.68 MG/DL (ref 0.76–1.27)
EGFRCR SERPLBLD CKD-EPI 2021: 108.4 ML/MIN/1.73
GLUCOSE SERPL-MCNC: 136 MG/DL (ref 65–99)
INR PPP: 1.57 (ref 0.9–1.1)
INR PPP: 1.59 (ref 0.9–1.1)
INR PPP: 1.82 (ref 0.9–1.1)
POTASSIUM SERPL-SCNC: 4 MMOL/L (ref 3.5–5.2)
PROTHROMBIN TIME: 19 SECONDS (ref 11.7–14.2)
PROTHROMBIN TIME: 19.2 SECONDS (ref 11.7–14.2)
PROTHROMBIN TIME: 21.3 SECONDS (ref 11.7–14.2)
QT INTERVAL: 344 MS
QT INTERVAL: 379 MS
QT INTERVAL: 405 MS
SODIUM SERPL-SCNC: 140 MMOL/L (ref 136–145)

## 2023-03-10 PROCEDURE — 80048 BASIC METABOLIC PNL TOTAL CA: CPT

## 2023-03-10 PROCEDURE — 93005 ELECTROCARDIOGRAM TRACING: CPT

## 2023-03-10 PROCEDURE — 85610 PROTHROMBIN TIME: CPT

## 2023-03-10 PROCEDURE — 99238 HOSP IP/OBS DSCHRG MGMT 30/<: CPT

## 2023-03-10 PROCEDURE — 93005 ELECTROCARDIOGRAM TRACING: CPT | Performed by: INTERNAL MEDICINE

## 2023-03-10 PROCEDURE — 93010 ELECTROCARDIOGRAM REPORT: CPT | Performed by: INTERNAL MEDICINE

## 2023-03-10 RX ORDER — COLCHICINE 0.6 MG/1
1.2 TABLET ORAL ONCE
Status: COMPLETED | OUTPATIENT
Start: 2023-03-10 | End: 2023-03-10

## 2023-03-10 RX ORDER — ENOXAPARIN SODIUM 150 MG/ML
150 INJECTION SUBCUTANEOUS EVERY 12 HOURS
Qty: 20 ML | Refills: 0 | Status: SHIPPED | OUTPATIENT
Start: 2023-03-10

## 2023-03-10 RX ORDER — ENOXAPARIN SODIUM 100 MG/ML
1 INJECTION SUBCUTANEOUS EVERY 12 HOURS SCHEDULED
Qty: 32 ML | Refills: 0 | Status: SHIPPED | OUTPATIENT
Start: 2023-03-10 | End: 2023-03-10

## 2023-03-10 RX ORDER — WARFARIN SODIUM 7.5 MG/1
7.5 TABLET ORAL
Status: DISCONTINUED | OUTPATIENT
Start: 2023-03-10 | End: 2023-03-10

## 2023-03-10 RX ORDER — WARFARIN SODIUM 3 MG/1
3 TABLET ORAL
Status: DISCONTINUED | OUTPATIENT
Start: 2023-03-10 | End: 2023-03-10

## 2023-03-10 RX ORDER — WARFARIN SODIUM 7.5 MG/1
7.5 TABLET ORAL ONCE
Status: COMPLETED | OUTPATIENT
Start: 2023-03-10 | End: 2023-03-10

## 2023-03-10 RX ORDER — WARFARIN SODIUM 5 MG/1
7.5 TABLET ORAL
COMMUNITY

## 2023-03-10 RX ORDER — WARFARIN SODIUM 7.5 MG/1
7.5 TABLET ORAL
Status: DISCONTINUED | OUTPATIENT
Start: 2023-03-11 | End: 2023-03-10 | Stop reason: HOSPADM

## 2023-03-10 RX ORDER — DOFETILIDE 0.5 MG/1
500 CAPSULE ORAL EVERY 12 HOURS
Qty: 60 CAPSULE | Refills: 2 | Status: SHIPPED | OUTPATIENT
Start: 2023-03-10

## 2023-03-10 RX ADMIN — METOPROLOL SUCCINATE 100 MG: 100 TABLET, EXTENDED RELEASE ORAL at 09:31

## 2023-03-10 RX ADMIN — AMLODIPINE BESYLATE 10 MG: 10 TABLET ORAL at 09:31

## 2023-03-10 RX ADMIN — COLCHICINE 1.2 MG: 0.6 TABLET, FILM COATED ORAL at 17:11

## 2023-03-10 RX ADMIN — WARFARIN 7.5 MG: 7.5 TABLET ORAL at 10:49

## 2023-03-10 RX ADMIN — LEVOTHYROXINE SODIUM 150 MCG: 0.15 TABLET ORAL at 05:39

## 2023-03-10 RX ADMIN — FUROSEMIDE 10 MG: 20 TABLET ORAL at 09:31

## 2023-03-10 RX ADMIN — DOFETILIDE 500 MCG: 0.5 CAPSULE ORAL at 09:51

## 2023-03-10 RX ADMIN — LISINOPRIL 20 MG: 20 TABLET ORAL at 09:31

## 2023-03-10 RX ADMIN — GABAPENTIN 300 MG: 300 CAPSULE ORAL at 09:31

## 2023-03-10 RX ADMIN — FERROUS SULFATE TAB 325 MG (65 MG ELEMENTAL FE) 325 MG: 325 (65 FE) TAB at 09:31

## 2023-03-10 NOTE — NURSING NOTE
S/p cardioversion that was unsuccessful. On tikosyn. Planned for cardioversion today. NPO. afib on tele. RA. Up ad ady. AOX4.

## 2023-03-10 NOTE — DISCHARGE SUMMARY
DISCHARGE NOTE    Patient Name: Marcus Simon  Age/Sex: 58 y.o. male  : 1965  MRN: [id number]    Date of Discharge:  3/10/2023   Date of Admit: 3/8/2023  Encounter Provider: JOESPH Mai  Place of Service: [facility] CARDIOLOGY  Patient Care Team:  Jackson Vázquez MD as PCP - General  Jackson Vázquez MD as PCP - Family Medicine    Subjective:     Discharge Diagnosis:    Persistent atrial fibrillation (HCC)    Acute systolic heart failure (CMS/HCC)    Acute on chronic systolic heart failure (CMS/HCC)    Chronic systolic heart failure (CMS/HCC)      Hospital Course:   Mr. Marcus Simon is a 58 year old male who is a patient of Dr. Adams. He has a history of persistent atrial fibrillation.     He was admitted for tikosyn initiation on 3/8. He was started on 500mcg BID. Yesterday 3/9 he was cardioverted. He went into NSR but after a couple of hours had recurrent AF. INR yesterday was 2.88.     We planned to cardiovert him again today. However, yesterday there was confusion about what dose of warfarin he was taking and he was underdosed. This morning his INR was 1.88, we gave 7.5mg and then rechecked and INR was 1.5.     We did not feel like it was safe to proceed with CV at this time. We discussed with the patient and will plan to discharge him on tikosyn 500mcg BID. We will bring him back when INR therapeutic for 3 weeks and cardiovert as outpatient.     He will see his PCP Monday who manages his INR. In the meantime we will cover with lovenox until INR therapeutic.     Vital Signs  Temp:  [97.7 °F (36.5 °C)-98.9 °F (37.2 °C)] 98.9 °F (37.2 °C)  Heart Rate:  [] 110  Resp:  [18] 18  BP: (116-129)/(82-99) 125/97    Intake/Output Summary (Last 24 hours) at 3/10/2023 1537  Last data filed at 3/10/2023 1203  Gross per 24 hour   Intake 0 ml   Output --   Net 0 ml        Physical Exam:    General Appearance: No acute distress, well developed and well nourished.   Eyes: Conjunctiva and lids: No erythema, swelling, or discharge. Sclera non-icteric.   HENT: Atraumatic, normocephalic. External eyes, ears, and nose normal.   Respiratory: No signs of respiratory distress. Respiration rhythm and depth normal.   Clear to auscultation. No rales, crackles, rhonchi, or wheezing auscultated.   Cardiovascular:  Heart Rate and Rhythm: irregularly irregular, Heart Sounds: Normal S1 and S2. No S3 or S4 noted  Gastrointestinal:  Abdomen soft, non-distended, non-tender.  Musculoskeletal: Normal movement of extremities  Skin: Warm and dry.   Psychiatric: Patient alert and oriented to person, place, and time. Speech and behavior appropriate. Normal mood and affect.    Labs:   Results from last 7 days   Lab Units 03/10/23  0308 03/09/23  0248 03/08/23 1951 03/08/23  1533   SODIUM mmol/L 140 140 139 137   POTASSIUM mmol/L 4.0 4.0 4.4 4.1   CHLORIDE mmol/L 102 103 103 101   CO2 mmol/L 26.7 28.0 26.7 29.2*   BUN mg/dL 14 15 15 15   CREATININE mg/dL 0.68* 0.60* 0.76 0.62*   GLUCOSE mg/dL 136* 121* 169* 126*   CALCIUM mg/dL 9.3 9.0 9.6 9.5         Results from last 7 days   Lab Units 03/08/23  1533   WBC 10*3/mm3 5.85   HEMOGLOBIN g/dL 16.1   HEMATOCRIT % 46.8   PLATELETS 10*3/mm3 163     Results from last 7 days   Lab Units 03/10/23  1454 03/10/23  1215 03/10/23  0308 03/09/23  0248 03/08/23  1533   INR  1.59* 1.57* 1.82* 2.88* 2.99*     Results from last 7 days   Lab Units 03/08/23 1951 03/08/23  1533   MAGNESIUM mg/dL 2.0 1.9                   Discharge Diet:    Dietary Orders (From admission, onward)     Start     Ordered    03/10/23 1433  Diet: Cardiac Diets; Healthy Heart (2-3 Na+); Texture: Regular Texture (IDDSI 7); Fluid Consistency: Thin (IDDSI 0)  Diet Effective Now        References:    Diet Order [place]   Question Answer Comment   Diets: Cardiac Diets    Cardiac Diet: Healthy Heart  (2-3 Na+)    Texture: Regular Texture (IDDSI 7)    Fluid Consistency: Thin (IDDSI 0)        03/10/23 9393                  Activity at Discharge:  as tolerated     Discharge Medications     Discharge Medications      New Medications      Instructions Start Date   dofetilide 500 MCG capsule  Commonly known as: Tikosyn   500 mcg, Oral, Every 12 Hours      Enoxaparin Sodium 100 MG/ML solution prefilled syringe syringe  Commonly known as: LOVENOX   1 mg/kg (160 mg), Subcutaneous, Every 12 Hours Scheduled, Take until INR within range (2-3)         Continue These Medications      Instructions Start Date   amLODIPine-benazepril 10-20 MG per capsule  Commonly known as: LOTREL   1 capsule, Oral, Daily      baclofen 10 MG tablet  Commonly known as: LIORESAL   10 mg, Oral, 3 Times Daily PRN      benzonatate 200 MG capsule  Commonly known as: TESSALON   2 Times Daily PRN      colchicine 0.6 MG tablet   Daily PRN      ferrous gluconate 324 MG tablet  Commonly known as: FERGON   324 mg, Oral, Daily With Breakfast      furosemide 20 MG tablet  Commonly known as: Lasix   10 mg, Oral, Daily      gabapentin 300 MG capsule  Commonly known as: NEURONTIN   300 mg, Oral, 2 Times Daily      levothyroxine 150 MCG tablet  Commonly known as: SYNTHROID, LEVOTHROID   150 mcg, Oral, Daily      metoprolol succinate  MG 24 hr tablet  Commonly known as: TOPROL-XL   100 mg, Oral, Daily      Vitamin D (Cholecalciferol) 10 MCG (400 UNIT) tablet  Commonly known as: CHOLECALCIFEROL   400 Units, Oral, Daily      vitamin E 100 UNIT capsule   100 Units, Oral, Daily      warfarin 5 MG tablet  Commonly known as: COUMADIN   7.5 mg, Oral, Daily Warfarin, For a total dose of 7.5 mg dose             Discharge disposition: Home     Follow-up Appointments   Follow-up Information     Jackson Vázquez MD .    Specialty: Family Medicine  Contact information:  [phone]  [place] [address]  [phone]                       No future  appointments.      Noah Aggarwal, APRN  03/10/23  15:37 EST

## 2023-03-10 NOTE — PROGRESS NOTES
[facility] Clinical Pharmacy Services: Warfarin Dosing/Monitoring Consult    Marcus Simon is a 58 y.o. male, estimated creatinine clearance is 220.9 mL/min (A) (by C-G formula based on SCr of 0.6 mg/dL (L)). weighing (!) 158 kg (348 lb 3.2 oz).    Results from last 7 days   Lab Units 03/09/23  0248 03/08/23  1533   INR  2.88* 2.99*   HEMOGLOBIN g/dL  --  16.1   HEMATOCRIT %  --  46.8   PLATELETS 10*3/mm3  --  163     Prior to admission anticoagulation: Per nurse practitioner, most recent warfarin regimen is 7.5 mg daily (52.5 mg/week)-see previous note.    Hospital Anticoagulation:  Consulting provider: JOESPH Mai  Start date: 3/8/23  Indication: Atrial fibrillation  Target INR: 2 - 3  Expected duration: Lifelong  Bridge Therapy: No    Potential food or drug interactions:   Levothyroxine (minor-home med)-concurrent use increases metabolism of vitamin K-dependent clotting factors, resulting in an increased risk of bleeding.      Education complete?/Date: No; plan for follow up TBD    INR Assessment:  Date INR Dose   3/8 2.99 7.5 mg   3/9 2.88 1.5 mg   3/10 1.82 7.5 mg in the AM          Assessment/Plan:  INR is currently subtherapeutic at 1.82 due to confusion with the dose. Will give a 7.5 mg dose now, then check an INR 2 hours after administration. Restart home dose after that.   Monitor for any signs or symptoms of bleeding. H/H is stable at 16.1/46.8-no reports of bleeding.   Follow up daily INRs and dose adjustments.    Pharmacy will continue to follow until discharge or discontinuation of warfarin.     Emily Combs, Pharm.D., [facility]   Clinical Pharmacist   Phone Extension #[phone]

## 2023-03-10 NOTE — NURSING NOTE
Diuretic in Use: lasix  Response to Diuretics (Output greater than intake):   Daily Weight   O2 Requirements: ra  Functional Status  independant symptoms):   Discharge Plans: tbd

## 2023-03-10 NOTE — PROGRESS NOTES
There was some confusion about the dose of warfarin that he was taking at home. Patient was telling staff that he had been taking 1.5mg daily. His dose was decreased yesterday and INR this morning 1.88. he now says that he was taking 1.5 tablets which would be 7.5mg.  We discussed the low risk of stroke associated with INR of 1.8 and recent CV. Patient acknowledges and would like to continue with current plan. We also discussed discharge and bringing him back in a few weeks for repeat CV but he wishes to proceed now.

## 2023-03-11 NOTE — OUTREACH NOTE
Prep Survey    Flowsheet Row Responses   [facility] facility patient discharged from? [facility]   Is LACE score < 7 ? No   Eligibility Readm Mgmt   Discharge diagnosis Persistent atrial fibrillation Acute systolic heart failure    Does the patient have one of the following disease processes/diagnoses(primary or secondary)? CHF   Does the patient have Home health ordered? No   Is there a DME ordered? No   Prep survey completed? Yes          CHRISTAL MARTINEZ - Registered Nurse

## 2023-03-14 ENCOUNTER — READMISSION MANAGEMENT (OUTPATIENT)
Dept: CALL CENTER | Facility: HOSPITAL | Age: 58
End: 2023-03-14
Payer: COMMERCIAL

## 2023-03-14 NOTE — OUTREACH NOTE
CHF Week 1 Survey    Flowsheet Row Responses   [facility] patient discharged from? [place]   Does the patient have one of the following disease processes/diagnoses(primary or secondary)? CHF   CHF Week 1 attempt successful? Yes   Call start time 1421   Call end time 1432   Discharge diagnosis Persistent atrial fibrillation Acute systolic heart failure    Person spoke with today (if not patient) and relationship patient   Meds reviewed with patient/caregiver? Yes   Is the patient having any side effects they believe may be caused by any medication additions or changes? No   Does the patient have all medications ordered at discharge? Yes   Is the patient taking all medications as directed (includes completed medication regime)? Yes   Does the patient have a primary care provider?  Yes   Does the patient have an appointment with their PCP within 7 days of discharge? No   Comments regarding PCP Dr. Vázquez   What is preventing the patient from scheduling follow up appointments within 7 days of discharge? Haven't had time   Nursing Interventions Educated patient on importance of making appointment   Has the patient kept scheduled appointments due by today? N/A   Pulse Ox monitoring None   Psychosocial issues? No   Did the patient receive a copy of their discharge instructions? Yes   Nursing interventions Reviewed instructions with patient   What is the patient's perception of their health status since discharge? Improving   Is the patient able to teach back signs and symptoms of worsening condition? (i.e. weight gain, shortness of air, etc.) Yes   If the patient is a current smoker, are they able to teach back resources for cessation? Not a smoker   Is the patient/caregiver able to teach back the hierarchy of who to call/visit for symptoms/problems? PCP, Specialist, Home health nurse, Urgent Care, ED, 911 Yes   Is the patient able to teach back Heart Failure Zones? Yes   CHF Nursing Interventions Education provided  on various zones   CHF Zone this Call Green Zone   Green Zone Patient reports doing well, No new or worsening shortness of breath, No chest pain   Green Zone Interventions Daily weight check, Low sodium diet, Meds as directed, Follow up visits planned    CHF Week 1 call completed? Yes   Is the patient interested in additional calls from an ambulatory ?  NOTE:  applies to high risk patients requiring additional follow-up. No   Would this patient benefit from a Referral to [facility] Social Work? No   Wrap up additional comments Pt is doing well at this time.  We discussed starting daily weights.  S/sx of CHf were reviewed.   Call end time 1432          ADITYA GONZALEZ - Registered Nurse

## 2023-03-15 DIAGNOSIS — I48.19 PERSISTENT ATRIAL FIBRILLATION: ICD-10-CM

## 2023-03-15 DIAGNOSIS — I48.21 PERMANENT ATRIAL FIBRILLATION: Primary | ICD-10-CM

## 2023-03-17 ENCOUNTER — TRANSCRIBE ORDERS (OUTPATIENT)
Dept: CARDIOLOGY | Facility: CLINIC | Age: 58
End: 2023-03-17
Payer: COMMERCIAL

## 2023-03-17 DIAGNOSIS — Z13.6 SCREENING FOR ISCHEMIC HEART DISEASE: Primary | ICD-10-CM

## 2023-03-23 ENCOUNTER — READMISSION MANAGEMENT (OUTPATIENT)
Dept: CALL CENTER | Facility: HOSPITAL | Age: 58
End: 2023-03-23
Payer: COMMERCIAL

## 2023-03-23 NOTE — OUTREACH NOTE
CHF Week 2 Survey    Flowsheet Row Responses   [facility] patient discharged from? [facility]   Does the patient have one of the following disease processes/diagnoses(primary or secondary)? CHF   Week 2 attempt successful? Yes   Call start time 1058   Call end time 1104   Discharge diagnosis Persistent atrial fibrillation Acute systolic heart failure    Meds reviewed with patient/caregiver? Yes   Is the patient taking all medications as directed (includes completed medication regime)? Yes   Does the patient have a primary care provider?  Yes   Does the patient have an appointment with their PCP within 7 days of discharge? Yes   Has the patient kept scheduled appointments due by today? Yes   Has home health visited the patient within 72 hours of discharge? N/A   Psychosocial issues? No   Did the patient receive a copy of their discharge instructions? Yes   Nursing interventions Reviewed instructions with patient   What is the patient's perception of their health status since discharge? Improving   Nursing interventions Nurse provided patient education   Is the patient able to teach back signs and symptoms of worsening condition? (i.e. weight gain, shortness of air, etc.) Yes   Is the patient/caregiver able to teach back the hierarchy of who to call/visit for symptoms/problems? PCP, Specialist, Home health nurse, Urgent Care, ED, 911 Yes   Is the patient able to teach back Heart Failure Zones? Yes   CHF Nursing Interventions Education provided on various zones   CHF Zone this Call Green Zone   Green Zone Patient reports doing well   CHF Week 2 call completed? Yes   Wrap up additional comments Pt reports they are still workin gto get INR back up  to where it needs to be. Pt aware of s/s of CHF.    Call end time 1104          FAHEEM JOSEPH - Registered Nurse

## 2023-04-04 ENCOUNTER — OFFICE VISIT (OUTPATIENT)
Dept: CARDIOLOGY | Facility: CLINIC | Age: 58
End: 2023-04-04
Payer: COMMERCIAL

## 2023-04-04 VITALS
HEART RATE: 111 BPM | HEIGHT: 76 IN | SYSTOLIC BLOOD PRESSURE: 116 MMHG | DIASTOLIC BLOOD PRESSURE: 74 MMHG | WEIGHT: 315 LBS | BODY MASS INDEX: 38.36 KG/M2

## 2023-04-04 DIAGNOSIS — I48.19 PERSISTENT ATRIAL FIBRILLATION: Primary | ICD-10-CM

## 2023-04-04 PROCEDURE — 99214 OFFICE O/P EST MOD 30 MIN: CPT

## 2023-04-05 PROCEDURE — 93000 ELECTROCARDIOGRAM COMPLETE: CPT

## 2023-04-05 NOTE — PROGRESS NOTES
"Date of Office Visit: 2023  Encounter Provider: JOESPH Mai  Place of Service: [facility] CARDIOLOGY  Patient Name: Marcus Simon  :1965    Chief Complaint   Patient presents with   • persistent AFIB   • dofetilide therapy   • Dizziness   :     HPI: Marcus Simon is a 58 y.o. male who is a patient of Dr. Adams. He has a history of persistent atrial fibrillation.      He was admitted for tikosyn initiation on 3/8. He was started on 500mcg BID. Yesterday 3/9 he was cardioverted. He went into NSR but after a couple of hours had recurrent AF. INR yesterday was 2.88.      We planned to cardiovert him again today. However, yesterday there was confusion about what dose of warfarin he was taking and he was underdosed. This morning his INR was 1.88, we gave 7.5mg and then rechecked and INR was 1.5.      We did not feel like it was safe to proceed with CV at this time. We discharged patient on tikosyn 500mcg and started lovenox to bridge since INR was low. Advised to follow up with PCP for warfarin adjustment and would proceed with CV once INR within range.                       He presents today for follow up appt.     He called the office a few days ago complaining of 3 episodes of dizziness, \"tunnel vision\" while working.     Denies any chest pain, dyspnea, palpitations, or syncope.     EKG today shows AF. QT is WNL.       His INR is now therapeutic. He finished the lovenox doses. INR has been difficult to control lately. Says it have never been a problem.     Continues to complain of fatigue and dyspnea with exertion while in AF.     He really wants to be able to mow his yard and exercise without fatigue. He is really focused on weight loss and improving his health.     Currently on tikosyn 500mcg BID.     Warfarin for AC.      Past Medical History:   Diagnosis Date   • Bulging disc    • CAD (coronary artery disease)    • CHF (congestive heart failure)    • DDD " (degenerative disc disease)    • HLD (hyperlipidemia)    • HTN (hypertension)    • Hypothyroidism    • MI (myocardial infarction)    • Osteoarthritis    • PAF (paroxysmal atrial fibrillation)    • Peptic ulcer    • SOB (shortness of breath)    • Transient cerebral ischemia        Past Surgical History:   Procedure Laterality Date   • CARDIAC CATHETERIZATION     • CARDIAC CATHETERIZATION N/A 10/19/2022    Procedure: Left Heart Cath;  Surgeon: Ron Cowan MD;  Location: [facility] CATH INVASIVE LOCATION;  Service: Cardiology;  Laterality: N/A;   • CARDIAC CATHETERIZATION N/A 10/19/2022    Procedure: Left ventriculography;  Surgeon: Ron Cowan MD;  Location: [facility] CATH INVASIVE LOCATION;  Service: Cardiology;  Laterality: N/A;   • CARDIAC CATHETERIZATION N/A 10/19/2022    Procedure: Coronary angiography;  Surgeon: Ron Cowan MD;  Location: [facility] CATH INVASIVE LOCATION;  Service: Cardiology;  Laterality: N/A;   • CHOLECYSTECTOMY     • GASTRIC RESTRICTION SURGERY     • HAND SURGERY     • SHOULDER SURGERY         Social History     Socioeconomic History   • Marital status:    Tobacco Use   • Smoking status: Never   • Smokeless tobacco: Never   Vaping Use   • Vaping Use: Never used   Substance and Sexual Activity   • Alcohol use: No   • Drug use: No   • Sexual activity: Yes       Family History   Problem Relation Age of Onset   • Heart attack Mother    • No Known Problems Father    • No Known Problems Sister    • No Known Problems Brother    • No Known Problems Maternal Aunt    • No Known Problems Maternal Uncle    • No Known Problems Paternal Aunt    • No Known Problems Paternal Uncle    • No Known Problems Maternal Grandmother    • No Known Problems Maternal Grandfather    • No Known Problems Paternal Grandmother    • No Known Problems Paternal Grandfather        Review of Systems   Constitutional: Positive for malaise/fatigue. Negative for chills and fever.   Cardiovascular:  Negative for chest pain, dyspnea on exertion, leg swelling, near-syncope, orthopnea, palpitations, paroxysmal nocturnal dyspnea and syncope.   Respiratory: Negative for cough and shortness of breath.    Hematologic/Lymphatic: Negative.    Musculoskeletal: Negative for joint pain, joint swelling and myalgias.   Gastrointestinal: Negative for abdominal pain, diarrhea, melena, nausea and vomiting.   Genitourinary: Negative for frequency and hematuria.   Neurological: Negative for light-headedness, numbness, paresthesias and seizures.   Allergic/Immunologic: Negative.    All other systems reviewed and are negative.      Allergies   Allergen Reactions   • Penicillins    • Sulfa Antibiotics Unknown - High Severity         Current Outpatient Medications:   •  amLODIPine-benazepril (LOTREL) 10-20 MG per capsule, Take 1 capsule by mouth Daily., Disp: , Rfl:   •  baclofen (LIORESAL) 10 MG tablet, Take 1 tablet by mouth 3 (Three) Times a Day As Needed., Disp: , Rfl:   •  benzonatate (TESSALON) 200 MG capsule, 2 (Two) Times a Day As Needed., Disp: , Rfl:   •  colchicine 0.6 MG tablet, Daily As Needed (GOUT)., Disp: , Rfl:   •  dofetilide (Tikosyn) 500 MCG capsule, Take 1 capsule by mouth Every 12 (Twelve) Hours., Disp: 60 capsule, Rfl: 2  •  Enoxaparin Sodium (LOVENOX) 150 MG/ML syringe, Inject 1 mL under the skin into the appropriate area as directed Every 12 (Twelve) Hours., Disp: 20 mL, Rfl: 0  •  ferrous gluconate (FERGON) 324 MG tablet, Take 1 tablet by mouth Daily With Breakfast., Disp: , Rfl:   •  furosemide (LASIX) 20 MG tablet, Take 0.5 tablets by mouth Daily., Disp: 30 tablet, Rfl: 6  •  gabapentin (NEURONTIN) 300 MG capsule, Take 1 capsule by mouth 2 (Two) Times a Day., Disp: , Rfl:   •  levothyroxine (SYNTHROID, LEVOTHROID) 150 MCG tablet, Take 1 tablet by mouth Daily., Disp: , Rfl:   •  metoprolol succinate XL (TOPROL-XL) 100 MG 24 hr tablet, Take 1 tablet by mouth Daily., Disp: , Rfl:   •  Vitamin D,  "Cholecalciferol, (CHOLECALCIFEROL) 10 MCG (400 UNIT) tablet, Take 1 tablet by mouth Daily., Disp: , Rfl:   •  vitamin E 100 UNIT capsule, Take 1 capsule by mouth Daily., Disp: , Rfl:   •  warfarin (COUMADIN) 5 MG tablet, Take 1.5 tablets by mouth Daily. For a total dose of 7.5 mg dose, Disp: , Rfl:       Objective:     Vitals:    04/04/23 1440   BP: 116/74   Pulse: 111   Weight: (!) 156 kg (345 lb)   Height: 193 cm (76\")     Body mass index is 41.99 kg/m².    PHYSICAL EXAM:    Vitals Reviewed.   General Appearance: No acute distress, well developed and well nourished.   Eyes: Conjunctiva and lids: No erythema, swelling, or discharge. Sclera non-icteric.   HENT: Atraumatic, normocephalic. External eyes, ears, and nose normal.   Respiratory: No signs of respiratory distress. Respiration rhythm and depth normal.   Clear to auscultation. No rales, crackles, rhonchi, or wheezing auscultated.   Cardiovascular:  Heart Rate and Rhythm: Normal, Heart Sounds: Normal S1 and S2. No S3 or S4 noted.  Gastrointestinal:  Abdomen soft, non-distended, non-tender.   Musculoskeletal: Normal movement of extremities  Skin: Warm and dry.   Psychiatric: Patient alert and oriented to person, place, and time. Speech and behavior appropriate. Normal mood and affect.       ECG 12 Lead    Date/Time: 4/5/2023 7:50 AM  Performed by: Noah Aggarwal APRN  Authorized by: Noah Aggarwal APRN   Comparison: compared with previous ECG   Similar to previous ECG  Rhythm: atrial fibrillation  BPM: 111                Assessment:       Diagnosis Plan   1. Persistent atrial fibrillation  Cardioversion External in Cardiology Department             Plan:   1. Persistent AF--- on tikosyn---failed CV---- he remains on tikosyn 500mcg BID. QT is WNL and looks okay in office today. He strongly wishes to pursue rhythm control due to symptoms and impact on QOL.    We discussed ablation vs. Repeat CV. After discussion he would like to proceed with repeat CV now that " he has been on tikosyn for longer.     Advised him to continue to monitor for dizziness but do not see any clear etiology.     His INR has been difficult to control. We discussed transitioning to apixaban or rivaroxaban.  He is going to look into cost.       Since INR is okay will proceed with CV in 3 weeks.             As always, it has been a pleasure to participate in your patient's care.      Sincerely,         JOESPH Mai

## 2023-04-11 RX ORDER — DOFETILIDE 0.5 MG/1
500 CAPSULE ORAL EVERY 12 HOURS
Qty: 60 CAPSULE | Refills: 2 | Status: CANCELLED | OUTPATIENT
Start: 2023-04-11

## 2023-04-19 ENCOUNTER — TRANSCRIBE ORDERS (OUTPATIENT)
Dept: CARDIOLOGY | Facility: CLINIC | Age: 58
End: 2023-04-19
Payer: COMMERCIAL

## 2023-04-19 DIAGNOSIS — I48.0 PAROXYSMAL ATRIAL FIBRILLATION: Primary | ICD-10-CM

## 2023-04-21 ENCOUNTER — LAB (OUTPATIENT)
Dept: LAB | Facility: HOSPITAL | Age: 58
End: 2023-04-21
Payer: COMMERCIAL

## 2023-04-21 DIAGNOSIS — Z13.6 SCREENING FOR ISCHEMIC HEART DISEASE: ICD-10-CM

## 2023-04-21 DIAGNOSIS — I48.0 PAROXYSMAL ATRIAL FIBRILLATION: ICD-10-CM

## 2023-04-21 LAB
ANION GAP SERPL CALCULATED.3IONS-SCNC: 8 MMOL/L (ref 5–15)
BUN SERPL-MCNC: 11 MG/DL (ref 6–20)
BUN/CREAT SERPL: 19 (ref 7–25)
CALCIUM SPEC-SCNC: 10 MG/DL (ref 8.6–10.5)
CHLORIDE SERPL-SCNC: 105 MMOL/L (ref 98–107)
CO2 SERPL-SCNC: 31 MMOL/L (ref 22–29)
CREAT SERPL-MCNC: 0.58 MG/DL (ref 0.76–1.27)
EGFRCR SERPLBLD CKD-EPI 2021: 113.8 ML/MIN/1.73
GLUCOSE SERPL-MCNC: 150 MG/DL (ref 65–99)
INR PPP: 1.8 (ref 0.9–1.1)
POTASSIUM SERPL-SCNC: 4.1 MMOL/L (ref 3.5–5.2)
PROTHROMBIN TIME: 21.2 SECONDS (ref 11.7–14.2)
SODIUM SERPL-SCNC: 144 MMOL/L (ref 136–145)

## 2023-04-21 PROCEDURE — 36415 COLL VENOUS BLD VENIPUNCTURE: CPT

## 2023-04-21 PROCEDURE — 85610 PROTHROMBIN TIME: CPT

## 2023-04-21 PROCEDURE — 80048 BASIC METABOLIC PNL TOTAL CA: CPT

## 2023-04-24 ENCOUNTER — TELEPHONE (OUTPATIENT)
Dept: CARDIOLOGY | Facility: CLINIC | Age: 58
End: 2023-04-24
Payer: COMMERCIAL

## 2023-04-24 DIAGNOSIS — I48.19 PERSISTENT ATRIAL FIBRILLATION: Primary | ICD-10-CM

## 2023-04-24 NOTE — TELEPHONE ENCOUNTER
Left voicemail asking patient to call back to confirm appt time for Thursday 5/4 with Noah.  I told him 9:00, 9:30, 10:00 or 10:30.

## 2023-04-24 NOTE — TELEPHONE ENCOUNTER
Patient would like for you to call him to discuss his blood thinner.    He is asking to go on Eliquis for now so that he can have his procedure.  He mentioned that he is having a hard time regulating INR.    He cam be reached at [phone] or [phone].    Also, does he still need appt in a couple weeks?

## 2023-04-24 NOTE — TELEPHONE ENCOUNTER
----- Message from JOESPH Benz sent at 4/21/2023 11:19 AM EDT -----  Needs follow up with me on Tuesday or Thursday in 2-3 weeks.   ----- Message -----  From: Lab, Background User  Sent: 4/21/2023   9:15 AM EDT  To: Ben Adams MD

## 2023-05-04 ENCOUNTER — OFFICE VISIT (OUTPATIENT)
Dept: CARDIOLOGY | Facility: CLINIC | Age: 58
End: 2023-05-04
Payer: COMMERCIAL

## 2023-05-04 ENCOUNTER — TRANSCRIBE ORDERS (OUTPATIENT)
Dept: CARDIOLOGY | Facility: CLINIC | Age: 58
End: 2023-05-04
Payer: COMMERCIAL

## 2023-05-04 VITALS
DIASTOLIC BLOOD PRESSURE: 70 MMHG | HEART RATE: 112 BPM | HEIGHT: 76 IN | SYSTOLIC BLOOD PRESSURE: 108 MMHG | WEIGHT: 315 LBS | BODY MASS INDEX: 38.36 KG/M2

## 2023-05-04 DIAGNOSIS — Z13.6 SCREENING FOR ISCHEMIC HEART DISEASE: Primary | ICD-10-CM

## 2023-05-04 DIAGNOSIS — I48.19 PERSISTENT ATRIAL FIBRILLATION: Primary | ICD-10-CM

## 2023-05-05 NOTE — H&P (VIEW-ONLY)
Date of Office Visit: 2023  Encounter Provider: JOESPH Mai  Place of Service: [facility] CARDIOLOGY  Patient Name: Marcus Simon  :1965    Chief Complaint   Patient presents with   • Atrial Fibrillation   :     HPI: Marcus Simon is a 58 y.o. male who follows with Dr. Cowan, referred to Dr. Adams for persistent atrial fibrillation.     He was admitted in March and started on tikosyn. He tolerated 500mcg BID. We cardioverted him but were unable to get him to go into NSR. There was issues with his warfarin dose and his INR dropped to 1.6. we started him on lovenox and planned to bring him back for outpatient CV.     Over the past few weeks he continued to have issues with INR. It was in range for two weeks then dropped back to 1.6. I called him and recommended transitioning to apixaban, which he started on .                  He presents today for follow up appt.     Since starting on tikosyn, he feels like his AF is less severe.     He says he does not notice his heart rate as much and a little less fatigued.     Still has some dyspnea with exertion that is unchanged.     EKG shows AF. Heart rate is 101bpm.     Remains on tikosyn 500mcg BID. He is tolerating well.     He has been on apixaban since , no missed doses.           Past Medical History:   Diagnosis Date   • Bulging disc    • CAD (coronary artery disease)    • CHF (congestive heart failure)    • DDD (degenerative disc disease)    • HLD (hyperlipidemia)    • HTN (hypertension)    • Hypothyroidism    • MI (myocardial infarction)    • Osteoarthritis    • PAF (paroxysmal atrial fibrillation)    • Peptic ulcer    • SOB (shortness of breath)    • Transient cerebral ischemia        Past Surgical History:   Procedure Laterality Date   • CARDIAC CATHETERIZATION     • CARDIAC CATHETERIZATION N/A 10/19/2022    Procedure: Left Heart Cath;  Surgeon: Ron Cowan MD;  Location: [facility]  INVASIVE LOCATION;  Service: Cardiology;  Laterality: N/A;   • CARDIAC CATHETERIZATION N/A 10/19/2022    Procedure: Left ventriculography;  Surgeon: Ron Cowan MD;  Location:  [facility] CATH INVASIVE LOCATION;  Service: Cardiology;  Laterality: N/A;   • CARDIAC CATHETERIZATION N/A 10/19/2022    Procedure: Coronary angiography;  Surgeon: Ron Cowan MD;  Location:  [facility] CATH INVASIVE LOCATION;  Service: Cardiology;  Laterality: N/A;   • CHOLECYSTECTOMY     • GASTRIC RESTRICTION SURGERY     • HAND SURGERY     • SHOULDER SURGERY         Social History     Socioeconomic History   • Marital status:    Tobacco Use   • Smoking status: Never   • Smokeless tobacco: Never   Vaping Use   • Vaping Use: Never used   Substance and Sexual Activity   • Alcohol use: No   • Drug use: No   • Sexual activity: Yes       Family History   Problem Relation Age of Onset   • Heart attack Mother    • No Known Problems Father    • No Known Problems Sister    • No Known Problems Brother    • No Known Problems Maternal Aunt    • No Known Problems Maternal Uncle    • No Known Problems Paternal Aunt    • No Known Problems Paternal Uncle    • No Known Problems Maternal Grandmother    • No Known Problems Maternal Grandfather    • No Known Problems Paternal Grandmother    • No Known Problems Paternal Grandfather        Review of Systems   Constitutional: Negative for chills, fever and malaise/fatigue.   Cardiovascular: Negative for chest pain, dyspnea on exertion, leg swelling, near-syncope, orthopnea, palpitations, paroxysmal nocturnal dyspnea and syncope.   Respiratory: Negative for cough and shortness of breath.    Hematologic/Lymphatic: Negative.    Musculoskeletal: Negative for joint pain, joint swelling and myalgias.   Gastrointestinal: Negative for abdominal pain, diarrhea, melena, nausea and vomiting.   Genitourinary: Negative for frequency and hematuria.   Neurological: Negative for light-headedness, numbness,  "paresthesias and seizures.   Allergic/Immunologic: Negative.    All other systems reviewed and are negative.      Allergies   Allergen Reactions   • Penicillins    • Sulfa Antibiotics Unknown - High Severity         Current Outpatient Medications:   •  amLODIPine-benazepril (LOTREL) 10-20 MG per capsule, Take 1 capsule by mouth Daily., Disp: , Rfl:   •  apixaban (ELIQUIS) 5 MG tablet tablet, Take 1 tablet by mouth 2 (Two) Times a Day., Disp: 60 tablet, Rfl: 2  •  baclofen (LIORESAL) 10 MG tablet, Take 1 tablet by mouth 3 (Three) Times a Day As Needed., Disp: , Rfl:   •  benzonatate (TESSALON) 200 MG capsule, 2 (Two) Times a Day As Needed., Disp: , Rfl:   •  colchicine 0.6 MG tablet, Daily As Needed (GOUT)., Disp: , Rfl:   •  dofetilide (Tikosyn) 500 MCG capsule, Take 1 capsule by mouth Every 12 (Twelve) Hours., Disp: 60 capsule, Rfl: 2  •  ferrous gluconate (FERGON) 324 MG tablet, Take 1 tablet by mouth Daily With Breakfast., Disp: , Rfl:   •  furosemide (LASIX) 20 MG tablet, Take 0.5 tablets by mouth Daily. (Patient taking differently: Take 1 tablet by mouth 2 (Two) Times a Day.), Disp: 30 tablet, Rfl: 6  •  gabapentin (NEURONTIN) 300 MG capsule, Take 1 capsule by mouth 2 (Two) Times a Day., Disp: , Rfl:   •  levothyroxine (SYNTHROID, LEVOTHROID) 150 MCG tablet, Take 1 tablet by mouth Daily., Disp: , Rfl:   •  metoprolol succinate XL (TOPROL-XL) 100 MG 24 hr tablet, Take 1 tablet by mouth Daily., Disp: , Rfl:   •  Vitamin D, Cholecalciferol, (CHOLECALCIFEROL) 10 MCG (400 UNIT) tablet, Take 1 tablet by mouth Daily., Disp: , Rfl:   •  vitamin E 100 UNIT capsule, Take 1 capsule by mouth Daily., Disp: , Rfl:       Objective:     Vitals:    05/04/23 0917   BP: 108/70   Pulse: 112   Weight: (!) 157 kg (346 lb)   Height: 193 cm (76\")     Body mass index is 42.12 kg/m².    PHYSICAL EXAM:    Vitals Reviewed.   General Appearance: No acute distress, well developed and well nourished.   Eyes: Conjunctiva and lids: No " erythema, swelling, or discharge. Sclera non-icteric.   HENT: Atraumatic, normocephalic. External eyes, ears, and nose normal.   Respiratory: No signs of respiratory distress. Respiration rhythm and depth normal.   Clear to auscultation. No rales, crackles, rhonchi, or wheezing auscultated.   Cardiovascular:  Heart Rate and Rhythm: Normal, Heart Sounds: Normal S1 and S2. No S3 or S4 noted.  Gastrointestinal:  Abdomen soft, non-distended, non-tender.   Musculoskeletal: Normal movement of extremities  Skin: Warm and dry.   Psychiatric: Patient alert and oriented to person, place, and time. Speech and behavior appropriate. Normal mood and affect.       ECG 12 Lead    Date/Time: 5/5/2023 9:26 AM  Performed by: Noah Aggarwal APRN  Authorized by: Noah Aggarwal APRN   Comparison: compared with previous ECG   Similar to previous ECG  Rhythm: atrial fibrillation  BPM: 112                Assessment:       Diagnosis Plan   1. Persistent atrial fibrillation  Cardioversion External in Cardiology Department             Plan:   1. Persistent AF--s/p unsuccessful CV---- had issues with INR so now on apixaban. He remains on tikosyn 500mcg BID and is still in AF. Once he is on apixaban for 3 weeks uninterrupted then we will proceed with CV, if still unsuccessful then can consider additional rhythm control strategies.       Discussed the risks of cardioversion with the patient. He acknowledges and wishes to proceed.         As always, it has been a pleasure to participate in your patient's care.      Sincerely,         JOESPH Mai

## 2023-05-17 ENCOUNTER — LAB (OUTPATIENT)
Dept: LAB | Facility: HOSPITAL | Age: 58
End: 2023-05-17
Payer: COMMERCIAL

## 2023-05-17 DIAGNOSIS — Z13.6 SCREENING FOR ISCHEMIC HEART DISEASE: ICD-10-CM

## 2023-05-17 LAB
ANION GAP SERPL CALCULATED.3IONS-SCNC: 8 MMOL/L (ref 5–15)
BUN SERPL-MCNC: 13 MG/DL (ref 6–20)
BUN/CREAT SERPL: 20 (ref 7–25)
CALCIUM SPEC-SCNC: 9.4 MG/DL (ref 8.6–10.5)
CHLORIDE SERPL-SCNC: 102 MMOL/L (ref 98–107)
CO2 SERPL-SCNC: 31 MMOL/L (ref 22–29)
CREAT SERPL-MCNC: 0.65 MG/DL (ref 0.76–1.27)
EGFRCR SERPLBLD CKD-EPI 2021: 109.9 ML/MIN/1.73
GLUCOSE SERPL-MCNC: 136 MG/DL (ref 65–99)
POTASSIUM SERPL-SCNC: 3.9 MMOL/L (ref 3.5–5.2)
SODIUM SERPL-SCNC: 141 MMOL/L (ref 136–145)

## 2023-05-17 PROCEDURE — 80048 BASIC METABOLIC PNL TOTAL CA: CPT

## 2023-05-17 PROCEDURE — 36415 COLL VENOUS BLD VENIPUNCTURE: CPT

## 2023-05-19 ENCOUNTER — HOSPITAL ENCOUNTER (OUTPATIENT)
Dept: CARDIOLOGY | Facility: HOSPITAL | Age: 58
Discharge: HOME | End: 2023-05-19
Payer: COMMERCIAL

## 2023-05-19 VITALS
RESPIRATION RATE: 18 BRPM | WEIGHT: 315 LBS | HEART RATE: 98 BPM | OXYGEN SATURATION: 97 % | HEIGHT: 76 IN | TEMPERATURE: 98.5 F | DIASTOLIC BLOOD PRESSURE: 81 MMHG | SYSTOLIC BLOOD PRESSURE: 107 MMHG | BODY MASS INDEX: 38.36 KG/M2

## 2023-05-19 DIAGNOSIS — I48.19 PERSISTENT ATRIAL FIBRILLATION: ICD-10-CM

## 2023-05-19 DIAGNOSIS — I48.19 PERSISTENT ATRIAL FIBRILLATION: Primary | ICD-10-CM

## 2023-05-19 LAB
MAXIMAL PREDICTED HEART RATE: 163 BPM
QT INTERVAL: 401 MS
STRESS TARGET HR: 139 BPM

## 2023-05-19 PROCEDURE — 93005 ELECTROCARDIOGRAM TRACING: CPT | Performed by: INTERNAL MEDICINE

## 2023-05-19 PROCEDURE — 99152 MOD SED SAME PHYS/QHP 5/>YRS: CPT

## 2023-05-19 PROCEDURE — 92960 CARDIOVERSION ELECTRIC EXT: CPT

## 2023-05-19 PROCEDURE — 92960 CARDIOVERSION ELECTRIC EXT: CPT | Performed by: INTERNAL MEDICINE

## 2023-05-19 RX ORDER — SODIUM CHLORIDE 9 MG/ML
40 INJECTION, SOLUTION INTRAVENOUS AS NEEDED
Status: DISCONTINUED | OUTPATIENT
Start: 2023-05-19 | End: 2023-05-20 | Stop reason: HOSPADM

## 2023-05-19 RX ORDER — SODIUM CHLORIDE 9 MG/ML
75 INJECTION, SOLUTION INTRAVENOUS CONTINUOUS
Status: DISCONTINUED | OUTPATIENT
Start: 2023-05-19 | End: 2023-05-20 | Stop reason: HOSPADM

## 2023-05-19 RX ORDER — SODIUM CHLORIDE 0.9 % (FLUSH) 0.9 %
10 SYRINGE (ML) INJECTION EVERY 12 HOURS SCHEDULED
Status: DISCONTINUED | OUTPATIENT
Start: 2023-05-19 | End: 2023-05-20 | Stop reason: HOSPADM

## 2023-05-19 RX ORDER — FUROSEMIDE 20 MG/1
20 TABLET ORAL 2 TIMES DAILY
Start: 2023-05-19

## 2023-05-19 RX ORDER — SODIUM CHLORIDE 0.9 % (FLUSH) 0.9 %
10 SYRINGE (ML) INJECTION AS NEEDED
Status: DISCONTINUED | OUTPATIENT
Start: 2023-05-19 | End: 2023-05-20 | Stop reason: HOSPADM

## 2023-05-19 RX ADMIN — Medication 70 MG: at 09:37

## 2023-05-19 RX ADMIN — Medication 20 MG: at 09:40

## 2023-05-24 ENCOUNTER — TRANSCRIBE ORDERS (OUTPATIENT)
Dept: CARDIOLOGY | Facility: CLINIC | Age: 58
End: 2023-05-24
Payer: COMMERCIAL

## 2023-05-24 DIAGNOSIS — Z01.810 PRE-OPERATIVE CARDIOVASCULAR EXAMINATION: Primary | ICD-10-CM

## 2023-05-24 DIAGNOSIS — Z13.6 SCREENING FOR ISCHEMIC HEART DISEASE: ICD-10-CM

## 2023-05-31 RX ORDER — DOFETILIDE 0.5 MG/1
CAPSULE ORAL
Qty: 60 CAPSULE | Refills: 2 | Status: SHIPPED | OUTPATIENT
Start: 2023-05-31

## 2023-08-03 ENCOUNTER — OFFICE VISIT (OUTPATIENT)
Dept: CARDIOLOGY | Facility: CLINIC | Age: 58
End: 2023-08-03
Payer: COMMERCIAL

## 2023-08-03 VITALS
WEIGHT: 315 LBS | SYSTOLIC BLOOD PRESSURE: 134 MMHG | DIASTOLIC BLOOD PRESSURE: 80 MMHG | HEIGHT: 76 IN | BODY MASS INDEX: 38.36 KG/M2 | HEART RATE: 125 BPM

## 2023-08-03 DIAGNOSIS — I48.19 ATRIAL FIBRILLATION, PERSISTENT: Primary | ICD-10-CM

## 2023-08-03 DIAGNOSIS — I48.19 PERSISTENT ATRIAL FIBRILLATION: ICD-10-CM

## 2023-08-04 ENCOUNTER — TRANSCRIBE ORDERS (OUTPATIENT)
Dept: CARDIOLOGY | Facility: CLINIC | Age: 58
End: 2023-08-04
Payer: COMMERCIAL

## 2023-08-04 DIAGNOSIS — Z01.810 PRE-OPERATIVE CARDIOVASCULAR EXAMINATION: ICD-10-CM

## 2023-08-04 DIAGNOSIS — Z13.6 SCREENING FOR ISCHEMIC HEART DISEASE: Primary | ICD-10-CM

## 2023-08-23 ENCOUNTER — LAB (OUTPATIENT)
Dept: LAB | Facility: HOSPITAL | Age: 58
End: 2023-08-23
Payer: COMMERCIAL

## 2023-08-23 DIAGNOSIS — Z13.6 SCREENING FOR ISCHEMIC HEART DISEASE: ICD-10-CM

## 2023-08-23 DIAGNOSIS — Z01.810 PRE-OPERATIVE CARDIOVASCULAR EXAMINATION: ICD-10-CM

## 2023-08-23 LAB
ANION GAP SERPL CALCULATED.3IONS-SCNC: 13.4 MMOL/L (ref 5–15)
BASOPHILS # BLD AUTO: 0.03 10*3/MM3 (ref 0–0.2)
BASOPHILS NFR BLD AUTO: 0.5 % (ref 0–1.5)
BUN SERPL-MCNC: 13 MG/DL (ref 6–20)
BUN/CREAT SERPL: 17.6 (ref 7–25)
CALCIUM SPEC-SCNC: 9.7 MG/DL (ref 8.6–10.5)
CHLORIDE SERPL-SCNC: 100 MMOL/L (ref 98–107)
CO2 SERPL-SCNC: 27.6 MMOL/L (ref 22–29)
CREAT SERPL-MCNC: 0.74 MG/DL (ref 0.76–1.27)
DEPRECATED RDW RBC AUTO: 46.7 FL (ref 37–54)
EGFRCR SERPLBLD CKD-EPI 2021: 105.7 ML/MIN/1.73
EOSINOPHIL # BLD AUTO: 0.16 10*3/MM3 (ref 0–0.4)
EOSINOPHIL NFR BLD AUTO: 2.5 % (ref 0.3–6.2)
ERYTHROCYTE [DISTWIDTH] IN BLOOD BY AUTOMATED COUNT: 12.7 % (ref 12.3–15.4)
GLUCOSE SERPL-MCNC: 120 MG/DL (ref 65–99)
HCT VFR BLD AUTO: 45.6 % (ref 37.5–51)
HGB BLD-MCNC: 15.9 G/DL (ref 13–17.7)
IMM GRANULOCYTES # BLD AUTO: 0.02 10*3/MM3 (ref 0–0.05)
IMM GRANULOCYTES NFR BLD AUTO: 0.3 % (ref 0–0.5)
LYMPHOCYTES # BLD AUTO: 1.84 10*3/MM3 (ref 0.7–3.1)
LYMPHOCYTES NFR BLD AUTO: 28.4 % (ref 19.6–45.3)
MCH RBC QN AUTO: 35.2 PG (ref 26.6–33)
MCHC RBC AUTO-ENTMCNC: 34.9 G/DL (ref 31.5–35.7)
MCV RBC AUTO: 100.9 FL (ref 79–97)
MONOCYTES # BLD AUTO: 0.78 10*3/MM3 (ref 0.1–0.9)
MONOCYTES NFR BLD AUTO: 12.1 % (ref 5–12)
NEUTROPHILS NFR BLD AUTO: 3.64 10*3/MM3 (ref 1.7–7)
NEUTROPHILS NFR BLD AUTO: 56.2 % (ref 42.7–76)
NRBC BLD AUTO-RTO: 0 /100 WBC (ref 0–0.2)
PLATELET # BLD AUTO: 202 10*3/MM3 (ref 140–450)
PMV BLD AUTO: 10 FL (ref 6–12)
POTASSIUM SERPL-SCNC: 3.8 MMOL/L (ref 3.5–5.2)
RBC # BLD AUTO: 4.52 10*6/MM3 (ref 4.14–5.8)
SODIUM SERPL-SCNC: 141 MMOL/L (ref 136–145)
WBC NRBC COR # BLD: 6.47 10*3/MM3 (ref 3.4–10.8)

## 2023-08-23 PROCEDURE — 85025 COMPLETE CBC W/AUTO DIFF WBC: CPT

## 2023-08-23 PROCEDURE — 80048 BASIC METABOLIC PNL TOTAL CA: CPT

## 2023-08-23 PROCEDURE — 36415 COLL VENOUS BLD VENIPUNCTURE: CPT

## 2023-08-25 ENCOUNTER — HOSPITAL ENCOUNTER (OUTPATIENT)
Dept: CARDIOLOGY | Facility: HOSPITAL | Age: 58
Discharge: HOME | End: 2023-08-25
Payer: COMMERCIAL

## 2023-08-25 VITALS
WEIGHT: 315 LBS | OXYGEN SATURATION: 97 % | BODY MASS INDEX: 38.36 KG/M2 | HEIGHT: 76 IN | HEART RATE: 70 BPM | SYSTOLIC BLOOD PRESSURE: 123 MMHG | RESPIRATION RATE: 20 BRPM | TEMPERATURE: 96.6 F | DIASTOLIC BLOOD PRESSURE: 79 MMHG

## 2023-08-25 DIAGNOSIS — I48.19 ATRIAL FIBRILLATION, PERSISTENT: ICD-10-CM

## 2023-08-25 LAB
QT INTERVAL: 354 MS
QT INTERVAL: 473 MS
QTC INTERVAL: 507 MS
QTC INTERVAL: 513 MS

## 2023-08-25 PROCEDURE — 92960 CARDIOVERSION ELECTRIC EXT: CPT

## 2023-08-25 PROCEDURE — 93005 ELECTROCARDIOGRAM TRACING: CPT | Performed by: INTERNAL MEDICINE

## 2023-08-25 PROCEDURE — 93005 ELECTROCARDIOGRAM TRACING: CPT

## 2023-08-25 RX ORDER — METHOHEXITAL IN WATER/PF 100MG/10ML
SYRINGE (ML) INTRAVENOUS
Status: COMPLETED | OUTPATIENT
Start: 2023-08-25 | End: 2023-08-25

## 2023-08-25 RX ORDER — SODIUM CHLORIDE 9 MG/ML
75 INJECTION, SOLUTION INTRAVENOUS CONTINUOUS
Status: DISCONTINUED | OUTPATIENT
Start: 2023-08-25 | End: 2023-08-26 | Stop reason: HOSPADM

## 2023-08-25 RX ORDER — SODIUM CHLORIDE 0.9 % (FLUSH) 0.9 %
10 SYRINGE (ML) INJECTION AS NEEDED
Status: DISCONTINUED | OUTPATIENT
Start: 2023-08-25 | End: 2023-08-26 | Stop reason: HOSPADM

## 2023-08-25 RX ORDER — NITROGLYCERIN 0.4 MG/1
0.4 TABLET SUBLINGUAL
Status: DISCONTINUED | OUTPATIENT
Start: 2023-08-25 | End: 2023-08-26 | Stop reason: HOSPADM

## 2023-08-25 RX ADMIN — Medication 60 MG: at 12:18

## 2023-08-25 RX ADMIN — SODIUM CHLORIDE 75 ML/HR: 9 INJECTION, SOLUTION INTRAVENOUS at 11:17

## 2023-08-25 NOTE — DISCHARGE INSTRUCTIONS
Electrical Cardioversion  Electrical cardioversion is the delivery of a jolt of electricity to restore a normal rhythm to the heart. A rhythm that is too fast or is not regular keeps the heart from pumping well. In this procedure, sticky patches or metal paddles are placed on the chest to deliver electricity to the heart from a device.    What can I expect after the procedure?  Your blood pressure, heart rate, breathing rate, and blood oxygen level will be monitored until you leave the hospital or clinic.  Your heart rhythm will be watched to make sure it does not change.  You may have some redness on the skin where the shocks were given.  Follow these instructions at home:  Do not drive for 24 hours if you were given a sedative during your procedure.  Take over-the-counter and prescription medicines only as told by your health care provider.  Ask your health care provider how to check your pulse. Check it often.  Rest for 48 hours after the procedure or as told by your health care provider.  Avoid or limit your caffeine use as told by your health care provider.  Keep all follow-up visits as told by your health care provider. This is important.  Contact a health care provider if:  You feel like your heart is beating too quickly or your pulse is not regular.  You have a serious muscle cramp that does not go away.  Get help right away if:  You have discomfort in your chest.  You are dizzy or you feel faint.  You have trouble breathing or you are short of breath.  Your speech is slurred.  You have trouble moving an arm or leg on one side of your body.  Your fingers or toes turn cold or blue.  Summary  Electrical cardioversion is the delivery of a jolt of electricity to restore a normal rhythm to the heart.  This procedure may be done right away in an emergency or may be a scheduled procedure if the condition is not an emergency.  Generally, this is a safe procedure.  After the procedure, check your pulse often as told  by your health care provider.

## 2023-08-25 NOTE — NURSING NOTE
JOESPH Mai at bedside discussing results and plan of care with patient and family.  Noah took his time explaining future plans if the patients reverts back to AF.  Patient and family verbalize understanding.

## 2023-08-28 RX ORDER — DOFETILIDE 0.5 MG/1
CAPSULE ORAL
Qty: 60 CAPSULE | Refills: 2 | Status: SHIPPED | OUTPATIENT
Start: 2023-08-28

## 2023-10-12 DIAGNOSIS — I48.19 PERSISTENT ATRIAL FIBRILLATION: ICD-10-CM

## 2023-10-12 RX ORDER — APIXABAN 5 MG/1
5 TABLET, FILM COATED ORAL 2 TIMES DAILY
Qty: 60 TABLET | Refills: 2 | Status: SHIPPED | OUTPATIENT
Start: 2023-10-12

## 2023-10-20 ENCOUNTER — TELEPHONE (OUTPATIENT)
Dept: CARDIOLOGY | Facility: CLINIC | Age: 58
End: 2023-10-20
Payer: COMMERCIAL

## 2023-10-20 ENCOUNTER — TELEPHONE (OUTPATIENT)
Dept: CARDIOLOGY | Facility: CLINIC | Age: 58
End: 2023-10-20

## 2023-10-20 NOTE — TELEPHONE ENCOUNTER
Caller: Marcus Simon    Relationship: Self    Best call back number: [phone]    What is the best time to reach you: ANYTIME     Who are you requesting to speak with (clinical staff, provider,  specific staff member): DR SULLIVAN OR CLINICAL     What was the call regarding: PT WANTED TO CHECK WITH DR SULLIVAN AS TO IF THERE ARE ANY PRECAUTIONS BEFORE GOING IN FOR A TOOTH EXTRACTION PROCEDURE. PT NEEDS TO KNOW IF HE SHOULD STOP TAKING ELIQUIS AT SOMETIME BEFORE THAT. PLEASE ADVISE     SENDING HIGH PRIORITY DUE TO PROCEDURE BEING 10.23.23    Is it okay if the provider responds through [facility]: PREFERS CALL BACK

## 2023-10-20 NOTE — TELEPHONE ENCOUNTER
Caller: Marcus Simon    Relationship to patient: Self    Best call back number: [phone]    Type of visit: FOLLOW UP     Requested date: ASAP      If rescheduling, when is the original appointment: 9.29.23     Additional notes:PT IS CALLING TO R/S A 1 MONTH FOLLOW UP THAT WAS SCHEDULED FOR 9.29.23 PT HAD TO CANCEL AND WOULD LIKE TO GET IN ASAP, PLEASE ADVISE. PT WILL BE OKAY WITH SEEING DR SULLIVAN OR FAITH HAYNES.

## 2023-10-25 NOTE — TELEPHONE ENCOUNTER
10.25.23    Called and left a voicemail to call back to the office to schedule this appointment. Hub okay to schedule with Noah PINK first available.     Thanks   Ag

## 2023-11-21 ENCOUNTER — OFFICE VISIT (OUTPATIENT)
Age: 58
End: 2023-11-21
Payer: COMMERCIAL

## 2023-11-21 VITALS
DIASTOLIC BLOOD PRESSURE: 78 MMHG | WEIGHT: 315 LBS | SYSTOLIC BLOOD PRESSURE: 126 MMHG | HEART RATE: 118 BPM | BODY MASS INDEX: 43.09 KG/M2

## 2023-11-21 DIAGNOSIS — I48.19 PERSISTENT ATRIAL FIBRILLATION: Primary | ICD-10-CM
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
## 2023-12-01 RX ORDER — DOFETILIDE 0.5 MG/1
CAPSULE ORAL
Qty: 60 CAPSULE | Refills: 5 | Status: SHIPPED | OUTPATIENT
Start: 2023-12-01

## 2024-01-08 DIAGNOSIS — I48.19 PERSISTENT ATRIAL FIBRILLATION: ICD-10-CM

## 2024-01-08 RX ORDER — APIXABAN 5 MG/1
5 TABLET, FILM COATED ORAL 2 TIMES DAILY
Qty: 60 TABLET | Refills: 11 | Status: SHIPPED | OUTPATIENT
Start: 2024-01-08

## 2024-01-24 ENCOUNTER — OFFICE VISIT (OUTPATIENT)
Dept: CARDIAC SURGERY | Facility: CLINIC | Age: 59
End: 2024-01-24
Payer: COMMERCIAL

## 2024-01-24 VITALS
BODY MASS INDEX: 38.36 KG/M2 | RESPIRATION RATE: 20 BRPM | DIASTOLIC BLOOD PRESSURE: 88 MMHG | HEART RATE: 115 BPM | TEMPERATURE: 96.9 F | HEIGHT: 76 IN | OXYGEN SATURATION: 96 % | SYSTOLIC BLOOD PRESSURE: 126 MMHG | WEIGHT: 315 LBS

## 2024-01-24 DIAGNOSIS — I48.19 PERSISTENT ATRIAL FIBRILLATION: Primary | ICD-10-CM

## 2024-01-30 NOTE — PROGRESS NOTES
"Chief Complaint  Irregular Heart Beat (Atrial Fib)    Subjective        Marcus Simon presents to [facility] CARDIAC SURGERY  History of Present Illness  58 years old male patient of Dr. Adams with hypothyroidism, history of gastric bypass (1984), persistent A-fib on Eliquis, comes for evaluation about possible compression procedure.  He has several years history of PAF.  He had failed multiple cardioversions (was on sinus rhythm for some time).  He underwent PVI ablation in July 2022 and after 1 month he was back in A-fib.  His last cardioversion was on 8/2023.  He has palpitations and dyspnea.  His symptoms impact his quality of life for worse.  Last echocardiogram showed an EF of 40% without any valvular disease.  Last cardiac cath 2022 did not show any coronary artery disease.      Objective   Vital Signs:  /88 (BP Location: Left arm, Patient Position: Sitting, Cuff Size: Adult)   Pulse 115   Temp 96.9 °F (36.1 °C)   Resp 20   Ht 193 cm (76\")   Wt (!) 154 kg (340 lb)   SpO2 96%   BMI 41.39 kg/m²   Estimated body mass index is 41.39 kg/m² as calculated from the following:    Height as of this encounter: 193 cm (76\").    Weight as of this encounter: 154 kg (340 lb).             Physical Exam  Constitutional:       Appearance: He is obese.   Cardiovascular:      Rate and Rhythm: Normal rate. Rhythm irregular.   Pulmonary:      Effort: Pulmonary effort is normal.      Breath sounds: Normal breath sounds.   Neurological:      General: No focal deficit present.      Mental Status: He is alert and oriented to person, place, and time. Mental status is at baseline.   Psychiatric:         Mood and Affect: Mood normal.         Behavior: Behavior normal.         Thought Content: Thought content normal.         Judgment: Judgment normal.        Result Review :                     Assessment and Plan     Diagnoses and all orders for this visit:    1. Persistent atrial fibrillation " (Primary)  -     CT chest wo contrast; Future      58 years old male with persistent atrial fibrillation.  After multiple failed cardioversions and ablations he is back in A-fib.  He is symptomatic and due to his symptoms impact his quality of life and exercise tolerance.  Due to symptoms he would like to continue to pursue rhythm control.  He wants to proceed with an Hybrid approach to try to convert his atrial fibrillation (convergent procedure).  I explained risk and benefits of the procedure to the patient and he agreed to proceed.  If any complications happens he agreed with open rescue and do a full maze.         Follow Up     No follow-ups on file.  Patient was given instructions and counseling regarding his condition or for health maintenance advice. Please see specific information pulled into the AVS if appropriate.

## 2024-02-08 ENCOUNTER — HOSPITAL ENCOUNTER (OUTPATIENT)
Facility: HOSPITAL | Age: 59
Discharge: HOME | End: 2024-02-08
Payer: COMMERCIAL

## 2024-02-08 DIAGNOSIS — I48.19 PERSISTENT ATRIAL FIBRILLATION: ICD-10-CM

## 2024-02-08 PROCEDURE — 71250 CT THORAX DX C-: CPT

## 2024-02-12 ENCOUNTER — TELEPHONE (OUTPATIENT)
Dept: CARDIAC SURGERY | Facility: CLINIC | Age: 59
End: 2024-02-12
Payer: COMMERCIAL

## 2024-02-12 ENCOUNTER — PREP FOR SURGERY (OUTPATIENT)
Dept: OTHER | Facility: HOSPITAL | Age: 59
End: 2024-02-12
Payer: COMMERCIAL

## 2024-02-12 DIAGNOSIS — I48.19 ATRIAL FIBRILLATION, PERSISTENT: Primary | ICD-10-CM

## 2024-02-12 RX ORDER — CHLORHEXIDINE GLUCONATE ORAL RINSE 1.2 MG/ML
15 SOLUTION DENTAL ONCE
OUTPATIENT
Start: 2024-02-12 | End: 2024-02-12

## 2024-02-12 RX ORDER — CEFAZOLIN SODIUM IN 0.9 % NACL 3 G/100 ML
3000 INTRAVENOUS SOLUTION, PIGGYBACK (ML) INTRAVENOUS ONCE
OUTPATIENT
Start: 2024-02-12 | End: 2024-02-12

## 2024-02-12 RX ORDER — CHLORHEXIDINE GLUCONATE ORAL RINSE 1.2 MG/ML
15 SOLUTION DENTAL EVERY 12 HOURS
OUTPATIENT
Start: 2024-02-12 | End: 2024-02-13

## 2024-02-19 ENCOUNTER — TELEPHONE (OUTPATIENT)
Dept: CARDIAC SURGERY | Facility: CLINIC | Age: 59
End: 2024-02-19
Payer: COMMERCIAL

## 2024-02-20 ENCOUNTER — TELEPHONE (OUTPATIENT)
Dept: CARDIAC SURGERY | Facility: CLINIC | Age: 59
End: 2024-02-20
Payer: COMMERCIAL

## 2024-02-20 NOTE — TELEPHONE ENCOUNTER
Called to discuss Last Dose Eliquis and Lovenox instructions.  request a call back as he is in the middle of an inspection.    Surgery scheduled 3/7/24     Last Dose Eliquis:3/1/2024    Start Lovenox on 3/3/24 with PM dose  3/4/24 AM and PM dose  3/5/24 AM and PM dose  3/6/24 AM dose (Last Injection)    Total 6 injections

## 2024-02-26 NOTE — TELEPHONE ENCOUNTER
Spoke with , ,Discussed Eliquis and Lovenox instructions. Went over Lovenox administration. Verified pharmacy. Lovenox escribed. Advised should he have any further questions/concerns to notify office. He verbalized understanding and this was agreeable.

## 2024-02-27 RX ORDER — ENOXAPARIN SODIUM 150 MG/ML
1 INJECTION SUBCUTANEOUS EVERY 12 HOURS
Qty: 6 ML | Refills: 0 | Status: SHIPPED | OUTPATIENT
Start: 2024-02-27 | End: 2024-03-01

## 2024-02-28 ENCOUNTER — TELEPHONE (OUTPATIENT)
Dept: CARDIAC SURGERY | Facility: CLINIC | Age: 59
End: 2024-02-28
Payer: COMMERCIAL

## 2024-03-12 ENCOUNTER — TELEPHONE (OUTPATIENT)
Dept: CARDIAC SURGERY | Facility: CLINIC | Age: 59
End: 2024-03-12
Payer: COMMERCIAL

## 2024-03-12 NOTE — TELEPHONE ENCOUNTER
Spoke with pt, surgery scheduled for 03/21/2024 5am arrival, PAT on 03/19/2024 830am pt agreeable.

## 2024-03-13 ENCOUNTER — TELEPHONE (OUTPATIENT)
Dept: CARDIAC SURGERY | Facility: CLINIC | Age: 59
End: 2024-03-13
Payer: COMMERCIAL

## 2024-03-13 NOTE — TELEPHONE ENCOUNTER
Caller: Marcus Simon    Relationship: Self    Best call back number:     [phone] (Mobile)       What is the best time to reach you: ANY    Who are you requesting to speak with (clinical staff, provider,  specific staff member): ANY    Do you know the name of the person who called: N/A    What was the call regarding: PT WANTED TO KNOW APPROXIMATELY HOW LONG HE WILL NEED TO BE OUT OF WORK AFTER PROCEDURE    Is it okay if the provider responds through MyChart: PT PREFERS CALL BACK

## 2024-03-14 NOTE — TELEPHONE ENCOUNTER
Notified patient that he will need to be off 1-2 weeks after the procedure. Patient verbalized understanding.

## 2024-03-19 ENCOUNTER — HOSPITAL ENCOUNTER (OUTPATIENT)
Dept: GENERAL RADIOLOGY | Facility: HOSPITAL | Age: 59
Discharge: HOME | End: 2024-03-19
Payer: COMMERCIAL

## 2024-03-19 ENCOUNTER — ANESTHESIA EVENT (OUTPATIENT)
Dept: PERIOP | Facility: HOSPITAL | Age: 59
End: 2024-03-19
Payer: COMMERCIAL

## 2024-03-19 ENCOUNTER — PRE-ADMISSION TESTING (OUTPATIENT)
Dept: PREADMISSION TESTING | Facility: HOSPITAL | Age: 59
DRG: 229 | End: 2024-03-19
Payer: COMMERCIAL

## 2024-03-19 VITALS
DIASTOLIC BLOOD PRESSURE: 98 MMHG | RESPIRATION RATE: 18 BRPM | HEIGHT: 73 IN | WEIGHT: 315 LBS | HEART RATE: 120 BPM | OXYGEN SATURATION: 98 % | BODY MASS INDEX: 41.75 KG/M2 | SYSTOLIC BLOOD PRESSURE: 139 MMHG

## 2024-03-19 DIAGNOSIS — I48.19 ATRIAL FIBRILLATION, PERSISTENT: ICD-10-CM

## 2024-03-19 LAB
ABO GROUP BLD: NORMAL
ALBUMIN SERPL-MCNC: 4.1 G/DL (ref 3.5–5.2)
ALBUMIN/GLOB SERPL: 1.6 G/DL
ALP SERPL-CCNC: 114 U/L (ref 39–117)
ALT SERPL W P-5'-P-CCNC: 20 U/L (ref 1–41)
ANION GAP SERPL CALCULATED.3IONS-SCNC: 9 MMOL/L (ref 5–15)
APTT PPP: 33.3 SECONDS (ref 22.7–35.4)
ARTERIAL PATENCY WRIST A: ABNORMAL
AST SERPL-CCNC: 16 U/L (ref 1–40)
ATMOSPHERIC PRESS: 751.5 MMHG
BASE EXCESS BLDA CALC-SCNC: 3.3 MMOL/L (ref 0–2)
BASOPHILS # BLD AUTO: 0.03 10*3/MM3 (ref 0–0.2)
BASOPHILS NFR BLD AUTO: 0.6 % (ref 0–1.5)
BDY SITE: ABNORMAL
BILIRUB SERPL-MCNC: 0.9 MG/DL (ref 0–1.2)
BILIRUB UR QL STRIP: NEGATIVE
BLD GP AB SCN SERPL QL: NEGATIVE
BUN SERPL-MCNC: 14 MG/DL (ref 6–20)
BUN/CREAT SERPL: 15.9 (ref 7–25)
CALCIUM SPEC-SCNC: 9.3 MG/DL (ref 8.6–10.5)
CHLORIDE SERPL-SCNC: 102 MMOL/L (ref 98–107)
CLARITY UR: CLEAR
CLOSE TME COLL+ADP + EPINEP PNL BLD: 96 % (ref 86–100)
CO2 BLDA-SCNC: 29.6 MMOL/L (ref 23–27)
CO2 SERPL-SCNC: 30 MMOL/L (ref 22–29)
COLOR UR: YELLOW
CREAT SERPL-MCNC: 0.88 MG/DL (ref 0.76–1.27)
DEPRECATED RDW RBC AUTO: 44.5 FL (ref 37–54)
EGFRCR SERPLBLD CKD-EPI 2021: 99.7 ML/MIN/1.73
EOSINOPHIL # BLD AUTO: 0.16 10*3/MM3 (ref 0–0.4)
EOSINOPHIL NFR BLD AUTO: 3.3 % (ref 0.3–6.2)
ERYTHROCYTE [DISTWIDTH] IN BLOOD BY AUTOMATED COUNT: 12.3 % (ref 12.3–15.4)
GLOBULIN UR ELPH-MCNC: 2.6 GM/DL
GLUCOSE SERPL-MCNC: 150 MG/DL (ref 65–99)
GLUCOSE UR STRIP-MCNC: NEGATIVE MG/DL
HBA1C MFR BLD: 6.3 % (ref 4.8–5.6)
HCO3 BLDA-SCNC: 28.3 MMOL/L (ref 22–28)
HCT VFR BLD AUTO: 45.4 % (ref 37.5–51)
HEMODILUTION: NO
HGB BLD-MCNC: 15.5 G/DL (ref 13–17.7)
HGB UR QL STRIP.AUTO: NEGATIVE
HOLD SPECIMEN: NORMAL
IMM GRANULOCYTES # BLD AUTO: 0.01 10*3/MM3 (ref 0–0.05)
IMM GRANULOCYTES NFR BLD AUTO: 0.2 % (ref 0–0.5)
INR PPP: 1.06 (ref 0.9–1.1)
KETONES UR QL STRIP: NEGATIVE
LEUKOCYTE ESTERASE UR QL STRIP.AUTO: NEGATIVE
LYMPHOCYTES # BLD AUTO: 1.18 10*3/MM3 (ref 0.7–3.1)
LYMPHOCYTES NFR BLD AUTO: 24.3 % (ref 19.6–45.3)
MAGNESIUM SERPL-MCNC: 1.6 MG/DL (ref 1.6–2.6)
MCH RBC QN AUTO: 33.8 PG (ref 26.6–33)
MCHC RBC AUTO-ENTMCNC: 34.1 G/DL (ref 31.5–35.7)
MCV RBC AUTO: 98.9 FL (ref 79–97)
MODALITY: ABNORMAL
MONOCYTES # BLD AUTO: 0.5 10*3/MM3 (ref 0.1–0.9)
MONOCYTES NFR BLD AUTO: 10.3 % (ref 5–12)
NEUTROPHILS NFR BLD AUTO: 2.97 10*3/MM3 (ref 1.7–7)
NEUTROPHILS NFR BLD AUTO: 61.3 % (ref 42.7–76)
NITRITE UR QL STRIP: NEGATIVE
NRBC BLD AUTO-RTO: 0 /100 WBC (ref 0–0.2)
PCO2 BLDA: 43 MM HG (ref 35–45)
PH BLDA: 7.43 PH UNITS (ref 7.35–7.45)
PH UR STRIP.AUTO: 6 [PH] (ref 5–8)
PLATELET # BLD AUTO: 179 10*3/MM3 (ref 140–450)
PMV BLD AUTO: 9.7 FL (ref 6–12)
PO2 BLDA: 71.4 MM HG (ref 80–100)
POTASSIUM SERPL-SCNC: 3.9 MMOL/L (ref 3.5–5.2)
PROT SERPL-MCNC: 6.7 G/DL (ref 6–8.5)
PROT UR QL STRIP: ABNORMAL
PROTHROMBIN TIME: 14 SECONDS (ref 11.7–14.2)
QT INTERVAL: 364 MS
QTC INTERVAL: 502 MS
RBC # BLD AUTO: 4.59 10*6/MM3 (ref 4.14–5.8)
RH BLD: POSITIVE
SAO2 % BLDCOA: 94.4 % (ref 92–98.5)
SARS-COV-2 RNA RESP QL NAA+PROBE: NOT DETECTED
SODIUM SERPL-SCNC: 141 MMOL/L (ref 136–145)
SP GR UR STRIP: 1.01 (ref 1–1.03)
T&S EXPIRATION DATE: NORMAL
TOTAL RATE: 18 BREATHS/MINUTE
UROBILINOGEN UR QL STRIP: ABNORMAL
WBC NRBC COR # BLD AUTO: 4.85 10*3/MM3 (ref 3.4–10.8)

## 2024-03-19 PROCEDURE — 71046 X-RAY EXAM CHEST 2 VIEWS: CPT

## 2024-03-19 PROCEDURE — 85576 BLOOD PLATELET AGGREGATION: CPT

## 2024-03-19 PROCEDURE — 83036 HEMOGLOBIN GLYCOSYLATED A1C: CPT

## 2024-03-19 PROCEDURE — 93010 ELECTROCARDIOGRAM REPORT: CPT | Performed by: INTERNAL MEDICINE

## 2024-03-19 PROCEDURE — 85610 PROTHROMBIN TIME: CPT

## 2024-03-19 PROCEDURE — 36415 COLL VENOUS BLD VENIPUNCTURE: CPT

## 2024-03-19 PROCEDURE — 86900 BLOOD TYPING SEROLOGIC ABO: CPT

## 2024-03-19 PROCEDURE — 86901 BLOOD TYPING SEROLOGIC RH(D): CPT

## 2024-03-19 PROCEDURE — 86850 RBC ANTIBODY SCREEN: CPT

## 2024-03-19 PROCEDURE — 85025 COMPLETE CBC W/AUTO DIFF WBC: CPT

## 2024-03-19 PROCEDURE — 82803 BLOOD GASES ANY COMBINATION: CPT

## 2024-03-19 PROCEDURE — 36600 WITHDRAWAL OF ARTERIAL BLOOD: CPT

## 2024-03-19 PROCEDURE — 86920 COMPATIBILITY TEST SPIN: CPT

## 2024-03-19 PROCEDURE — 87635 SARS-COV-2 COVID-19 AMP PRB: CPT

## 2024-03-19 PROCEDURE — 93005 ELECTROCARDIOGRAM TRACING: CPT

## 2024-03-19 PROCEDURE — 85730 THROMBOPLASTIN TIME PARTIAL: CPT

## 2024-03-19 PROCEDURE — 83735 ASSAY OF MAGNESIUM: CPT

## 2024-03-19 PROCEDURE — 80053 COMPREHEN METABOLIC PANEL: CPT

## 2024-03-19 PROCEDURE — 81003 URINALYSIS AUTO W/O SCOPE: CPT

## 2024-03-19 RX ORDER — FAMOTIDINE 10 MG/ML
20 INJECTION, SOLUTION INTRAVENOUS ONCE
Status: CANCELLED | OUTPATIENT
Start: 2024-03-19 | End: 2024-03-19

## 2024-03-19 RX ORDER — LIDOCAINE HYDROCHLORIDE 10 MG/ML
0.5 INJECTION, SOLUTION INFILTRATION; PERINEURAL ONCE AS NEEDED
Status: CANCELLED | OUTPATIENT
Start: 2024-03-19

## 2024-03-19 RX ORDER — CHLORHEXIDINE GLUCONATE ORAL RINSE 1.2 MG/ML
15 SOLUTION DENTAL EVERY 12 HOURS
Qty: 60 ML | Refills: 0 | Status: DISPENSED | OUTPATIENT
Start: 2024-03-19 | End: 2024-03-20

## 2024-03-19 RX ORDER — CHLORHEXIDINE GLUCONATE ORAL RINSE 1.2 MG/ML
15 SOLUTION DENTAL 2 TIMES DAILY
COMMUNITY
End: 2024-03-24 | Stop reason: HOSPADM

## 2024-03-19 RX ORDER — MIDAZOLAM HYDROCHLORIDE 1 MG/ML
1 INJECTION INTRAMUSCULAR; INTRAVENOUS
Status: CANCELLED | OUTPATIENT
Start: 2024-03-19

## 2024-03-19 RX ORDER — ACETAMINOPHEN 500 MG
1000 TABLET ORAL ONCE
Status: CANCELLED | OUTPATIENT
Start: 2024-03-19 | End: 2024-03-19

## 2024-03-19 RX ORDER — SODIUM CHLORIDE, SODIUM LACTATE, POTASSIUM CHLORIDE, CALCIUM CHLORIDE 600; 310; 30; 20 MG/100ML; MG/100ML; MG/100ML; MG/100ML
9 INJECTION, SOLUTION INTRAVENOUS CONTINUOUS
Status: CANCELLED | OUTPATIENT
Start: 2024-03-19

## 2024-03-19 RX ORDER — SODIUM CHLORIDE 0.9 % (FLUSH) 0.9 %
3 SYRINGE (ML) INJECTION EVERY 12 HOURS SCHEDULED
Status: CANCELLED | OUTPATIENT
Start: 2024-03-19

## 2024-03-19 RX ORDER — SODIUM CHLORIDE 0.9 % (FLUSH) 0.9 %
3-10 SYRINGE (ML) INJECTION AS NEEDED
Status: CANCELLED | OUTPATIENT
Start: 2024-03-19

## 2024-03-19 NOTE — DISCHARGE INSTRUCTIONS
Take the following medications the morning of surgery with a small sip of water:    NONE    If you are on prescription narcotic pain medication to control your pain you may also take that medication the morning of surgery.    General Instructions:  Do not eat or drink anything after midnight the night before surgery.  Infants may have breast milk up to four hours before surgery.  Infants drinking formula may drink formula up to six hours before surgery.   Patients who avoid smoking, chewing tobacco and alcohol for 4 weeks prior to surgery have a reduced risk of post-operative complications.  Quit smoking as many days before surgery as you can.  Do not smoke, use chewing tobacco or drink alcohol the day of surgery.   If applicable bring your C-PAP/ BI-PAP machine in with you to preop day of surgery.  Bring any papers given to you in the doctor’s office.  Wear clean comfortable clothes.  Do not wear contact lenses, false eyelashes or make-up.  Bring a case for your glasses.   Bring crutches or walker if applicable.  Remove all piercings.  Leave jewelry and any other valuables at home.  Hair extensions with metal clips must be removed prior to surgery.  The Pre-Admission Testing nurse will instruct you to bring medications if unable to obtain an accurate list in Pre-Admission Testing.        If you were given a blood bank ID arm band remember to bring it with you the day of surgery.    Preventing a Surgical Site Infection:  For 2 to 3 days before surgery, avoid shaving with a razor because the razor can irritate skin and make it easier to develop an infection.    Any areas of open skin can increase the risk of a post-operative wound infection by allowing bacteria to enter and travel throughout the body.  Notify your surgeon if you have any skin wounds / rashes even if it is not near the expected surgical site.  The area will need assessed to determine if surgery should be delayed until it is healed.  The night prior to  surgery shower using a fresh bar of anti-bacterial soap (such as Dial) and clean washcloth.  Sleep in a clean bed with clean clothing.  Do not allow pets to sleep with you.  Shower on the morning of surgery using a fresh bar of anti-bacterial soap (such as Dial) and clean washcloth.  Dry with a clean towel and dress in clean clothing.  Ask your surgeon if you will be receiving antibiotics prior to surgery.  Make sure you, your family, and all healthcare providers clean their hands with soap and water or an alcohol based hand  before caring for you or your wound.    Day of surgery:  Your arrival time is approximately two hours before your scheduled surgery time.  Upon arrival, a Pre-op nurse and Anesthesiologist will review your health history, obtain vital signs, and answer questions you may have.  The only belongings needed at this time will be your home medications and if applicable your C-PAP/BI-PAP machine.  A Pre-op nurse will start an IV and you may receive medication in preparation for surgery, including something to help you relax.      Please be aware that surgery does come with discomfort.  We want to make every effort to control your discomfort so please discuss any uncontrolled symptoms with your nurse.   Your doctor will most likely have prescribed pain medications.      If you are going home after surgery you will receive individualized written care instructions before being discharged.  A responsible adult must drive you to and from the hospital on the day of your surgery and ideally stay with you through the night.  Discharge prescriptions can be filled by the hospital pharmacy during regular pharmacy hours.  If you are having surgery late in the day/evening your prescription may be e-prescribed to your pharmacy.  Please verify your pharmacy hours or chose a 24 hour pharmacy to avoid not having access to your prescription because your pharmacy has closed for the day.    If you are staying  overnight following surgery, you will be transported to your hospital room following the recovery period.  [facility] has all private rooms.    If you have any questions please call Pre-Admission Testing at [phone].  Deductibles and co-payments are collected on the day of service. Please be prepared to pay the required co-pay, deductible or deposit on the day of service as defined by your plan.    Call your surgeon immediately if you experience any of the following symptoms:  Sore Throat  Shortness of Breath or difficulty breathing  Cough  Chills  Body soreness or muscle pain  Headache  Fever  New loss of taste or smell  Do not arrive for your surgery ill.  Your procedure will need to be rescheduled to another time.  You will need to call your physician before the day of surgery to avoid any unnecessary exposure to hospital staff as well as other patients.      CHLORHEXIDINE CLOTH INSTRUCTIONS  The morning of surgery follow these instructions using the Chlorhexidine cloths you've been given.  These steps reduce bacteria on the body.  Do not use the cloths near your eyes, ears mouth, genitalia or on open wounds.  Throw the cloths away after use but do not try to flush them down a toilet.      Open and remove one cloth at a time from the package.    Leave the cloth unfolded and begin the bathing.  Massage the skin with the cloths using gentle pressure to remove bacteria.  Do not scrub harshly.   Follow the steps below with one 2% CHG cloth per area (6 total cloths).  One cloth for neck, shoulders and chest.  One cloth for both arms, hands, fingers and underarms (do underarms last).  One cloth for the abdomen followed by groin.  One cloth for right leg and foot including between the toes.  One cloth for left leg and foot including between the toes.  The last cloth is to be used for the back of the neck, back and buttocks.    Allow the CHG to air dry 3 minutes on the skin which will give it time to  work and decrease the chance of irritation.  The skin may feel sticky until it is dry.  Do not rinse with water or any other liquid or you will lose the beneficial effects of the CHG.  If mild skin irritation occurs, do rinse the skin to remove the CHG.  Report this to the nurse at time of admission.  Do not apply lotions, creams, ointments, deodorants or perfumes after using the clothes. Dress in clean clothes before coming to the hospital.    BACTROBAN NASAL OINTMENT  There are many germs normally in your nose. Bactroban is an ointment that will help reduce these germs. Please follow these instructions for Bactroban use:    ____The day before surgery in the evening              Date________    ____The day of surgery in the morning    Date________    **Squirt ½ package of Bactroban Ointment onto a cotton applicator and apply to inside of 1st nostril.  Squirt the remaining Bactroban and apply to the inside of the other nostril.    PERIDEX- ORAL:  Use only if your surgeon has ordered  Use the night before and morning of surgery - Swish, gargle, and spit - do not swallow.
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
## 2024-03-19 NOTE — ANESTHESIA PREPROCEDURE EVALUATION
Anesthesia Evaluation                  Airway   Mallampati: II  TM distance: >3 FB  Neck ROM: limited  Possible difficult intubation and Large neck circumference  Dental          Pulmonary    (+) ,shortness of breath  Cardiovascular   Exercise tolerance: poor (<4 METS)    ECG reviewed  PT is on anticoagulation therapy  Patient on routine beta blocker  Rhythm: irregular  Rate: normal    (+) hypertension 2 medications or greater, past MI  >12 months, CAD, dysrhythmias Atrial Fib, CHF Systolic <55%, hyperlipidemia    ROS comment: · Estimated left ventricular EF = 40% Left ventricular systolic function is mildly decreased.  · Left ventricular diastolic function was indeterminate.  · Left ventricular wall thickness is consistent with moderate concentric hypertrophy.  · The right ventricular cavity is borderline dilated.  · The right atrial cavity is mildly dilated.  · Mildly reduced right ventricular systolic function noted.  · Estimated right ventricular systolic pressure from tricuspid regurgitation is normal (<35 mmHg).  · The following left ventricular wall segments are hypokinetic: mid anterior, apical anterior, basal anterolateral, mid anterolateral, apical lateral, basal inferolateral, mid inferolateral, apical inferior, mid inferior, apical septal, basal inferoseptal, mid inferoseptal, apex hypokinetic, mid anteroseptal, basal anterior, basal inferior and basal inferoseptal.    Negative cath 2022      Neuro/Psych  (+) TIA  GI/Hepatic/Renal/Endo    (+) obesity, morbid obesity, PUD, GI bleeding , diabetes mellitus, thyroid problem hypothyroidism    ROS Comment: Ozempic held    Musculoskeletal     Abdominal   (+) obese   Substance History      OB/GYN          Other   arthritis,                       Anesthesia Plan    ASA 4     general, Ernestina and CVL     (GALINA  Previously intubated electively with CMAC D blade )  intravenous induction   Postoperative Plan: Expected vent after surgery  Anesthetic plan, risks,  benefits, and alternatives have been provided, discussed and informed consent has been obtained with: patient.        CODE STATUS:

## 2024-03-21 ENCOUNTER — ANESTHESIA (OUTPATIENT)
Dept: PERIOP | Facility: HOSPITAL | Age: 59
End: 2024-03-21
Payer: COMMERCIAL

## 2024-03-21 ENCOUNTER — APPOINTMENT (OUTPATIENT)
Dept: GENERAL RADIOLOGY | Facility: HOSPITAL | Age: 59
DRG: 229 | End: 2024-03-21
Payer: COMMERCIAL

## 2024-03-21 ENCOUNTER — ANCILLARY PROCEDURE (OUTPATIENT)
Dept: PERIOP | Facility: HOSPITAL | Age: 59
DRG: 229 | End: 2024-03-21
Payer: COMMERCIAL

## 2024-03-21 ENCOUNTER — HOSPITAL ENCOUNTER (INPATIENT)
Facility: HOSPITAL | Age: 59
LOS: 3 days | Discharge: HOME | DRG: 229 | End: 2024-03-24
Payer: COMMERCIAL

## 2024-03-21 DIAGNOSIS — Z98.890 S/P THORACOTOMY: Primary | ICD-10-CM

## 2024-03-21 DIAGNOSIS — I48.19 ATRIAL FIBRILLATION, PERSISTENT: ICD-10-CM

## 2024-03-21 PROBLEM — I48.91 ATRIAL FIBRILLATION: Status: ACTIVE | Noted: 2024-03-21

## 2024-03-21 LAB
ACT BLD: 147 SECONDS (ref 82–152)
ACT BLD: 163 SECONDS (ref 82–152)
ACT BLD: 196 SECONDS (ref 82–152)
ACT BLD: 206 SECONDS (ref 82–152)
ACT BLD: 206 SECONDS (ref 82–152)
ACT BLD: 217 SECONDS (ref 82–152)
ACT BLD: 228 SECONDS (ref 82–152)
ACT BLD: 239 SECONDS (ref 82–152)
ANION GAP SERPL CALCULATED.3IONS-SCNC: 13 MMOL/L (ref 5–15)
BASE EXCESS BLDA CALC-SCNC: -1 MMOL/L (ref -5–5)
BASE EXCESS BLDA CALC-SCNC: -2 MMOL/L (ref -5–5)
BASE EXCESS BLDA CALC-SCNC: 1 MMOL/L (ref -5–5)
BUN SERPL-MCNC: 17 MG/DL (ref 6–20)
BUN/CREAT SERPL: 23.9 (ref 7–25)
CA-I BLDA-SCNC: ABNORMAL MMOL/L
CALCIUM SPEC-SCNC: 8.8 MG/DL (ref 8.6–10.5)
CHLORIDE SERPL-SCNC: 106 MMOL/L (ref 98–107)
CO2 BLDA-SCNC: 27 MMOL/L (ref 24–29)
CO2 BLDA-SCNC: 27 MMOL/L (ref 24–29)
CO2 BLDA-SCNC: 29 MMOL/L (ref 24–29)
CO2 SERPL-SCNC: 21 MMOL/L (ref 22–29)
CREAT SERPL-MCNC: 0.71 MG/DL (ref 0.76–1.27)
DEPRECATED RDW RBC AUTO: 46.7 FL (ref 37–54)
EGFRCR SERPLBLD CKD-EPI 2021: 106.3 ML/MIN/1.73
ERYTHROCYTE [DISTWIDTH] IN BLOOD BY AUTOMATED COUNT: 12.3 % (ref 12.3–15.4)
GLUCOSE BLDC GLUCOMTR-MCNC: 118 MG/DL (ref 70–130)
GLUCOSE BLDC GLUCOMTR-MCNC: 128 MG/DL (ref 70–130)
GLUCOSE BLDC GLUCOMTR-MCNC: 139 MG/DL (ref 70–130)
GLUCOSE BLDC GLUCOMTR-MCNC: 179 MG/DL (ref 70–130)
GLUCOSE BLDC GLUCOMTR-MCNC: 181 MG/DL (ref 70–130)
GLUCOSE BLDC GLUCOMTR-MCNC: 183 MG/DL (ref 70–130)
GLUCOSE BLDC GLUCOMTR-MCNC: 219 MG/DL (ref 70–130)
GLUCOSE SERPL-MCNC: 199 MG/DL (ref 65–99)
HCO3 BLDA-SCNC: 25.1 MMOL/L (ref 22–26)
HCO3 BLDA-SCNC: 25.5 MMOL/L (ref 22–26)
HCO3 BLDA-SCNC: 27.4 MMOL/L (ref 22–26)
HCT VFR BLD AUTO: 44 % (ref 37.5–51)
HCT VFR BLDA CALC: 39 % (ref 38–51)
HCT VFR BLDA CALC: 40 % (ref 38–51)
HCT VFR BLDA CALC: 43 % (ref 38–51)
HGB BLD-MCNC: 15.3 G/DL (ref 13–17.7)
HGB BLDA-MCNC: 13.3 G/DL (ref 12–17)
HGB BLDA-MCNC: 13.6 G/DL (ref 12–17)
HGB BLDA-MCNC: 14.6 G/DL (ref 12–17)
MAGNESIUM SERPL-MCNC: 1.8 MG/DL (ref 1.6–2.6)
MCH RBC QN AUTO: 35.6 PG (ref 26.6–33)
MCHC RBC AUTO-ENTMCNC: 34.8 G/DL (ref 31.5–35.7)
MCV RBC AUTO: 102.3 FL (ref 79–97)
PCO2 BLDA: 47.8 MM HG (ref 35–45)
PCO2 BLDA: 53.9 MM HG (ref 35–45)
PCO2 BLDA: 63.1 MM HG (ref 35–45)
PH BLDA: 7.23 PH UNITS (ref 7.35–7.6)
PH BLDA: 7.32 PH UNITS (ref 7.35–7.6)
PH BLDA: 7.35 PH UNITS (ref 7.35–7.6)
PLATELET # BLD AUTO: 168 10*3/MM3 (ref 140–450)
PMV BLD AUTO: 10.1 FL (ref 6–12)
PO2 BLDA: 357 MMHG (ref 80–105)
PO2 BLDA: 358 MMHG (ref 80–105)
PO2 BLDA: 58 MMHG (ref 80–105)
POTASSIUM BLDA-SCNC: 3.7 MMOL/L (ref 3.5–4.9)
POTASSIUM BLDA-SCNC: 3.8 MMOL/L (ref 3.5–4.9)
POTASSIUM BLDA-SCNC: 4.2 MMOL/L (ref 3.5–4.9)
POTASSIUM SERPL-SCNC: 4.2 MMOL/L (ref 3.5–5.2)
POTASSIUM SERPL-SCNC: 4.7 MMOL/L (ref 3.5–5.2)
QT INTERVAL: 328 MS
QT INTERVAL: 338 MS
QT INTERVAL: 341 MS
QT INTERVAL: 344 MS
QTC INTERVAL: 473 MS
QTC INTERVAL: 502 MS
QTC INTERVAL: 534 MS
QTC INTERVAL: 536 MS
RBC # BLD AUTO: 4.3 10*6/MM3 (ref 4.14–5.8)
SAO2 % BLDA: 100 % (ref 95–98)
SAO2 % BLDA: 100 % (ref 95–98)
SAO2 % BLDA: 83 % (ref 95–98)
SODIUM SERPL-SCNC: 140 MMOL/L (ref 136–145)
WBC NRBC COR # BLD AUTO: 9.7 10*3/MM3 (ref 3.4–10.8)

## 2024-03-21 PROCEDURE — 85027 COMPLETE CBC AUTOMATED: CPT | Performed by: NURSE PRACTITIONER

## 2024-03-21 PROCEDURE — 25010000002 HYDROMORPHONE 1 MG/ML SOLUTION: Performed by: ANESTHESIOLOGY

## 2024-03-21 PROCEDURE — 86920 COMPATIBILITY TEST SPIN: CPT

## 2024-03-21 PROCEDURE — 63710000001 INSULIN LISPRO (HUMAN) PER 5 UNITS: Performed by: NURSE PRACTITIONER

## 2024-03-21 PROCEDURE — 93010 ELECTROCARDIOGRAM REPORT: CPT | Performed by: INTERNAL MEDICINE

## 2024-03-21 PROCEDURE — 76000 FLUOROSCOPY <1 HR PHYS/QHP: CPT

## 2024-03-21 PROCEDURE — 85014 HEMATOCRIT: CPT

## 2024-03-21 PROCEDURE — 25010000002 ONDANSETRON PER 1 MG: Performed by: ANESTHESIOLOGY

## 2024-03-21 PROCEDURE — 33266 ABLATE ATRIA X10SV ENDO: CPT

## 2024-03-21 PROCEDURE — 85347 COAGULATION TIME ACTIVATED: CPT

## 2024-03-21 PROCEDURE — 25010000002 KETOROLAC TROMETHAMINE PER 15 MG: Performed by: ANESTHESIOLOGY

## 2024-03-21 PROCEDURE — 02584ZZ DESTRUCTION OF CONDUCTION MECHANISM, PERCUTANEOUS ENDOSCOPIC APPROACH: ICD-10-PCS

## 2024-03-21 PROCEDURE — 84132 ASSAY OF SERUM POTASSIUM: CPT | Performed by: INTERNAL MEDICINE

## 2024-03-21 PROCEDURE — 82947 ASSAY GLUCOSE BLOOD QUANT: CPT

## 2024-03-21 PROCEDURE — 93318 ECHO TRANSESOPHAGEAL INTRAOP: CPT | Performed by: ANESTHESIOLOGY

## 2024-03-21 PROCEDURE — B24BZZ4 ULTRASONOGRAPHY OF HEART WITH AORTA, TRANSESOPHAGEAL: ICD-10-PCS

## 2024-03-21 PROCEDURE — 25010000002 FENTANYL CITRATE (PF) 50 MCG/ML SOLUTION: Performed by: ANESTHESIOLOGY

## 2024-03-21 PROCEDURE — 5A2204Z RESTORATION OF CARDIAC RHYTHM, SINGLE: ICD-10-PCS

## 2024-03-21 PROCEDURE — 25010000002 PROPOFOL 200 MG/20ML EMULSION: Performed by: ANESTHESIOLOGY

## 2024-03-21 PROCEDURE — 25010000002 MAGNESIUM SULFATE 2 GM/50ML SOLUTION

## 2024-03-21 PROCEDURE — C1729 CATH, DRAINAGE: HCPCS

## 2024-03-21 PROCEDURE — 71045 X-RAY EXAM CHEST 1 VIEW: CPT

## 2024-03-21 PROCEDURE — 25010000002 METHYLPREDNISOLONE PER 125 MG: Performed by: NURSE PRACTITIONER

## 2024-03-21 PROCEDURE — 80048 BASIC METABOLIC PNL TOTAL CA: CPT | Performed by: NURSE PRACTITIONER

## 2024-03-21 PROCEDURE — 93005 ELECTROCARDIOGRAM TRACING: CPT | Performed by: INTERNAL MEDICINE

## 2024-03-21 PROCEDURE — 86901 BLOOD TYPING SEROLOGIC RH(D): CPT

## 2024-03-21 PROCEDURE — 86900 BLOOD TYPING SEROLOGIC ABO: CPT

## 2024-03-21 PROCEDURE — 25010000002 BUPIVACAINE (PF) 0.5 % SOLUTION 30 ML VIAL

## 2024-03-21 PROCEDURE — 82948 REAGENT STRIP/BLOOD GLUCOSE: CPT

## 2024-03-21 PROCEDURE — 25010000002 SUGAMMADEX 200 MG/2ML SOLUTION: Performed by: ANESTHESIOLOGY

## 2024-03-21 PROCEDURE — 25010000002 HEPARIN (PORCINE) PER 1000 UNITS: Performed by: ANESTHESIOLOGY

## 2024-03-21 PROCEDURE — 85018 HEMOGLOBIN: CPT

## 2024-03-21 PROCEDURE — 25010000002 DIGOXIN PER 500 MCG: Performed by: NURSE PRACTITIONER

## 2024-03-21 PROCEDURE — 25010000002 HYDROMORPHONE PER 4 MG: Performed by: ANESTHESIOLOGY

## 2024-03-21 PROCEDURE — 33266 ABLATE ATRIA X10SV ENDO: CPT | Performed by: PHYSICIAN ASSISTANT

## 2024-03-21 PROCEDURE — 83735 ASSAY OF MAGNESIUM: CPT | Performed by: INTERNAL MEDICINE

## 2024-03-21 PROCEDURE — 25010000002 CEFAZOLIN 3 G RECONSTITUTED SOLUTION 1 EACH VIAL: Performed by: NURSE PRACTITIONER

## 2024-03-21 PROCEDURE — 25010000002 DEXAMETHASONE SODIUM PHOSPHATE 20 MG/5ML SOLUTION: Performed by: ANESTHESIOLOGY

## 2024-03-21 PROCEDURE — 25010000002 MORPHINE PER 10 MG: Performed by: NURSE PRACTITIONER

## 2024-03-21 PROCEDURE — 82803 BLOOD GASES ANY COMBINATION: CPT

## 2024-03-21 PROCEDURE — 93005 ELECTROCARDIOGRAM TRACING: CPT | Performed by: NURSE PRACTITIONER

## 2024-03-21 PROCEDURE — 02L74CK OCCLUSION OF LEFT ATRIAL APPENDAGE WITH EXTRALUMINAL DEVICE, PERCUTANEOUS ENDOSCOPIC APPROACH: ICD-10-PCS

## 2024-03-21 DEVICE — IMPLANTABLE DEVICE: Type: IMPLANTABLE DEVICE | Site: HEART | Status: FUNCTIONAL

## 2024-03-21 RX ORDER — HYDROCODONE BITARTRATE AND ACETAMINOPHEN 5; 325 MG/1; MG/1
1 TABLET ORAL ONCE AS NEEDED
Status: DISCONTINUED | OUTPATIENT
Start: 2024-03-21 | End: 2024-03-21 | Stop reason: HOSPADM

## 2024-03-21 RX ORDER — FENTANYL CITRATE 50 UG/ML
INJECTION, SOLUTION INTRAMUSCULAR; INTRAVENOUS AS NEEDED
Status: DISCONTINUED | OUTPATIENT
Start: 2024-03-21 | End: 2024-03-21 | Stop reason: SURG

## 2024-03-21 RX ORDER — DROPERIDOL 2.5 MG/ML
0.62 INJECTION, SOLUTION INTRAMUSCULAR; INTRAVENOUS
Status: DISCONTINUED | OUTPATIENT
Start: 2024-03-21 | End: 2024-03-21 | Stop reason: HOSPADM

## 2024-03-21 RX ORDER — HYDROMORPHONE HYDROCHLORIDE 1 MG/ML
0.5 INJECTION, SOLUTION INTRAMUSCULAR; INTRAVENOUS; SUBCUTANEOUS
Status: DISCONTINUED | OUTPATIENT
Start: 2024-03-21 | End: 2024-03-21 | Stop reason: HOSPADM

## 2024-03-21 RX ORDER — ATORVASTATIN CALCIUM 20 MG/1
10 TABLET, FILM COATED ORAL NIGHTLY
Status: DISCONTINUED | OUTPATIENT
Start: 2024-03-21 | End: 2024-03-24 | Stop reason: HOSPADM

## 2024-03-21 RX ORDER — BENZONATATE 100 MG/1
200 CAPSULE ORAL 3 TIMES DAILY PRN
Status: DISCONTINUED | OUTPATIENT
Start: 2024-03-21 | End: 2024-03-24 | Stop reason: HOSPADM

## 2024-03-21 RX ORDER — SODIUM CHLORIDE 0.9 % (FLUSH) 0.9 %
3 SYRINGE (ML) INJECTION EVERY 12 HOURS SCHEDULED
Status: DISCONTINUED | OUTPATIENT
Start: 2024-03-21 | End: 2024-03-21 | Stop reason: HOSPADM

## 2024-03-21 RX ORDER — ONDANSETRON 2 MG/ML
4 INJECTION INTRAMUSCULAR; INTRAVENOUS ONCE AS NEEDED
Status: DISCONTINUED | OUTPATIENT
Start: 2024-03-21 | End: 2024-03-21 | Stop reason: HOSPADM

## 2024-03-21 RX ORDER — NALOXONE HCL 0.4 MG/ML
0.2 VIAL (ML) INJECTION AS NEEDED
Status: DISCONTINUED | OUTPATIENT
Start: 2024-03-21 | End: 2024-03-21 | Stop reason: HOSPADM

## 2024-03-21 RX ORDER — LIDOCAINE HYDROCHLORIDE 20 MG/ML
INJECTION, SOLUTION INFILTRATION; PERINEURAL AS NEEDED
Status: DISCONTINUED | OUTPATIENT
Start: 2024-03-21 | End: 2024-03-21 | Stop reason: SURG

## 2024-03-21 RX ORDER — PROPOFOL 10 MG/ML
INJECTION, EMULSION INTRAVENOUS AS NEEDED
Status: DISCONTINUED | OUTPATIENT
Start: 2024-03-21 | End: 2024-03-21 | Stop reason: SURG

## 2024-03-21 RX ORDER — CHLORHEXIDINE GLUCONATE ORAL RINSE 1.2 MG/ML
15 SOLUTION DENTAL ONCE
Status: COMPLETED | OUTPATIENT
Start: 2024-03-21 | End: 2024-03-21

## 2024-03-21 RX ORDER — GABAPENTIN 300 MG/1
300 CAPSULE ORAL 2 TIMES DAILY
Status: DISCONTINUED | OUTPATIENT
Start: 2024-03-21 | End: 2024-03-24 | Stop reason: HOSPADM

## 2024-03-21 RX ORDER — PROMETHAZINE HYDROCHLORIDE 25 MG/1
25 SUPPOSITORY RECTAL ONCE AS NEEDED
Status: DISCONTINUED | OUTPATIENT
Start: 2024-03-21 | End: 2024-03-21 | Stop reason: HOSPADM

## 2024-03-21 RX ORDER — ONDANSETRON 2 MG/ML
INJECTION INTRAMUSCULAR; INTRAVENOUS AS NEEDED
Status: DISCONTINUED | OUTPATIENT
Start: 2024-03-21 | End: 2024-03-21 | Stop reason: SURG

## 2024-03-21 RX ORDER — DEXTROSE MONOHYDRATE 25 G/50ML
25 INJECTION, SOLUTION INTRAVENOUS
Status: DISCONTINUED | OUTPATIENT
Start: 2024-03-21 | End: 2024-03-24 | Stop reason: HOSPADM

## 2024-03-21 RX ORDER — METHYLPREDNISOLONE SODIUM SUCCINATE 125 MG/2ML
60 INJECTION, POWDER, LYOPHILIZED, FOR SOLUTION INTRAMUSCULAR; INTRAVENOUS EVERY 8 HOURS
Status: DISCONTINUED | OUTPATIENT
Start: 2024-03-21 | End: 2024-03-22

## 2024-03-21 RX ORDER — ACETAMINOPHEN 500 MG
1000 TABLET ORAL ONCE
Status: COMPLETED | OUTPATIENT
Start: 2024-03-21 | End: 2024-03-21

## 2024-03-21 RX ORDER — METOPROLOL TARTRATE 25 MG/1
25 TABLET, FILM COATED ORAL ONCE
Status: COMPLETED | OUTPATIENT
Start: 2024-03-21 | End: 2024-03-21

## 2024-03-21 RX ORDER — ACETAMINOPHEN 325 MG/1
650 TABLET ORAL EVERY 4 HOURS PRN
Status: DISCONTINUED | OUTPATIENT
Start: 2024-03-21 | End: 2024-03-24 | Stop reason: HOSPADM

## 2024-03-21 RX ORDER — PANTOPRAZOLE SODIUM 40 MG/10ML
40 INJECTION, POWDER, LYOPHILIZED, FOR SOLUTION INTRAVENOUS ONCE
Status: COMPLETED | OUTPATIENT
Start: 2024-03-21 | End: 2024-03-21

## 2024-03-21 RX ORDER — PANTOPRAZOLE SODIUM 40 MG/1
40 TABLET, DELAYED RELEASE ORAL EVERY MORNING
Status: DISCONTINUED | OUTPATIENT
Start: 2024-03-22 | End: 2024-03-24 | Stop reason: HOSPADM

## 2024-03-21 RX ORDER — HYDROCODONE BITARTRATE AND ACETAMINOPHEN 10; 325 MG/1; MG/1
1 TABLET ORAL EVERY 4 HOURS PRN
Status: DISCONTINUED | OUTPATIENT
Start: 2024-03-21 | End: 2024-03-21

## 2024-03-21 RX ORDER — DIPHENHYDRAMINE HYDROCHLORIDE 50 MG/ML
12.5 INJECTION INTRAMUSCULAR; INTRAVENOUS
Status: DISCONTINUED | OUTPATIENT
Start: 2024-03-21 | End: 2024-03-21 | Stop reason: HOSPADM

## 2024-03-21 RX ORDER — PHENYLEPHRINE HCL IN 0.9% NACL 1 MG/10 ML
SYRINGE (ML) INTRAVENOUS AS NEEDED
Status: DISCONTINUED | OUTPATIENT
Start: 2024-03-21 | End: 2024-03-21 | Stop reason: SURG

## 2024-03-21 RX ORDER — HEPARIN SODIUM 1000 [USP'U]/ML
INJECTION, SOLUTION INTRAVENOUS; SUBCUTANEOUS AS NEEDED
Status: DISCONTINUED | OUTPATIENT
Start: 2024-03-21 | End: 2024-03-21 | Stop reason: SURG

## 2024-03-21 RX ORDER — BISACODYL 5 MG/1
5 TABLET, DELAYED RELEASE ORAL DAILY PRN
Status: ON HOLD | COMMUNITY
End: 2024-04-22

## 2024-03-21 RX ORDER — MAGNESIUM HYDROXIDE 1200 MG/15ML
LIQUID ORAL AS NEEDED
Status: DISCONTINUED | OUTPATIENT
Start: 2024-03-21 | End: 2024-03-21 | Stop reason: HOSPADM

## 2024-03-21 RX ORDER — IBUPROFEN 600 MG/1
1 TABLET ORAL
Status: DISCONTINUED | OUTPATIENT
Start: 2024-03-21 | End: 2024-03-24 | Stop reason: HOSPADM

## 2024-03-21 RX ORDER — LABETALOL HYDROCHLORIDE 5 MG/ML
5 INJECTION, SOLUTION INTRAVENOUS
Status: DISCONTINUED | OUTPATIENT
Start: 2024-03-21 | End: 2024-03-21 | Stop reason: HOSPADM

## 2024-03-21 RX ORDER — SODIUM CHLORIDE, SODIUM LACTATE, POTASSIUM CHLORIDE, CALCIUM CHLORIDE 600; 310; 30; 20 MG/100ML; MG/100ML; MG/100ML; MG/100ML
9 INJECTION, SOLUTION INTRAVENOUS CONTINUOUS
Status: DISCONTINUED | OUTPATIENT
Start: 2024-03-21 | End: 2024-03-21

## 2024-03-21 RX ORDER — ENOXAPARIN SODIUM 100 MG/ML
40 INJECTION SUBCUTANEOUS EVERY 24 HOURS
Status: DISCONTINUED | OUTPATIENT
Start: 2024-03-22 | End: 2024-03-23

## 2024-03-21 RX ORDER — OXYCODONE AND ACETAMINOPHEN 7.5; 325 MG/1; MG/1
1 TABLET ORAL EVERY 4 HOURS PRN
Status: DISCONTINUED | OUTPATIENT
Start: 2024-03-21 | End: 2024-03-21 | Stop reason: HOSPADM

## 2024-03-21 RX ORDER — SODIUM CHLORIDE 0.9 % (FLUSH) 0.9 %
3-10 SYRINGE (ML) INJECTION AS NEEDED
Status: DISCONTINUED | OUTPATIENT
Start: 2024-03-21 | End: 2024-03-21 | Stop reason: HOSPADM

## 2024-03-21 RX ORDER — ATORVASTATIN CALCIUM 10 MG/1
10 TABLET, FILM COATED ORAL NIGHTLY
COMMUNITY

## 2024-03-21 RX ORDER — HYDROCODONE BITARTRATE AND ACETAMINOPHEN 5; 325 MG/1; MG/1
2 TABLET ORAL EVERY 4 HOURS PRN
Status: DISCONTINUED | OUTPATIENT
Start: 2024-03-21 | End: 2024-03-24 | Stop reason: HOSPADM

## 2024-03-21 RX ORDER — FAMOTIDINE 10 MG/ML
20 INJECTION, SOLUTION INTRAVENOUS ONCE
Status: COMPLETED | OUTPATIENT
Start: 2024-03-21 | End: 2024-03-21

## 2024-03-21 RX ORDER — METOPROLOL SUCCINATE 50 MG/1
100 TABLET, EXTENDED RELEASE ORAL ONCE
Status: COMPLETED | OUTPATIENT
Start: 2024-03-21 | End: 2024-03-21

## 2024-03-21 RX ORDER — INSULIN LISPRO 100 [IU]/ML
2-9 INJECTION, SOLUTION INTRAVENOUS; SUBCUTANEOUS
Status: DISCONTINUED | OUTPATIENT
Start: 2024-03-21 | End: 2024-03-22

## 2024-03-21 RX ORDER — LIDOCAINE HYDROCHLORIDE 10 MG/ML
0.5 INJECTION, SOLUTION INFILTRATION; PERINEURAL ONCE AS NEEDED
Status: DISCONTINUED | OUTPATIENT
Start: 2024-03-21 | End: 2024-03-21 | Stop reason: HOSPADM

## 2024-03-21 RX ORDER — EPHEDRINE SULFATE 50 MG/ML
5 INJECTION, SOLUTION INTRAVENOUS ONCE AS NEEDED
Status: DISCONTINUED | OUTPATIENT
Start: 2024-03-21 | End: 2024-03-21 | Stop reason: HOSPADM

## 2024-03-21 RX ORDER — HYDRALAZINE HYDROCHLORIDE 20 MG/ML
5 INJECTION INTRAMUSCULAR; INTRAVENOUS
Status: DISCONTINUED | OUTPATIENT
Start: 2024-03-21 | End: 2024-03-21 | Stop reason: HOSPADM

## 2024-03-21 RX ORDER — KETOROLAC TROMETHAMINE 15 MG/ML
15 INJECTION, SOLUTION INTRAMUSCULAR; INTRAVENOUS EVERY 6 HOURS PRN
Status: DISCONTINUED | OUTPATIENT
Start: 2024-03-21 | End: 2024-03-24 | Stop reason: HOSPADM

## 2024-03-21 RX ORDER — HYDROCODONE BITARTRATE AND ACETAMINOPHEN 5; 325 MG/1; MG/1
2 TABLET ORAL EVERY 4 HOURS PRN
Status: DISCONTINUED | OUTPATIENT
Start: 2024-03-21 | End: 2024-03-21

## 2024-03-21 RX ORDER — KETOROLAC TROMETHAMINE 30 MG/ML
INJECTION, SOLUTION INTRAMUSCULAR; INTRAVENOUS AS NEEDED
Status: DISCONTINUED | OUTPATIENT
Start: 2024-03-21 | End: 2024-03-21 | Stop reason: SURG

## 2024-03-21 RX ORDER — MAGNESIUM SULFATE HEPTAHYDRATE 40 MG/ML
2 INJECTION, SOLUTION INTRAVENOUS 3 TIMES DAILY
Status: COMPLETED | OUTPATIENT
Start: 2024-03-21 | End: 2024-03-22

## 2024-03-21 RX ORDER — DIGOXIN 0.25 MG/ML
250 INJECTION INTRAMUSCULAR; INTRAVENOUS ONCE
Status: COMPLETED | OUTPATIENT
Start: 2024-03-21 | End: 2024-03-21

## 2024-03-21 RX ORDER — MIDAZOLAM HYDROCHLORIDE 1 MG/ML
1 INJECTION INTRAMUSCULAR; INTRAVENOUS
Status: DISCONTINUED | OUTPATIENT
Start: 2024-03-21 | End: 2024-03-21 | Stop reason: HOSPADM

## 2024-03-21 RX ORDER — MORPHINE SULFATE 2 MG/ML
2 INJECTION, SOLUTION INTRAMUSCULAR; INTRAVENOUS
Status: DISPENSED | OUTPATIENT
Start: 2024-03-21 | End: 2024-03-23

## 2024-03-21 RX ORDER — ROCURONIUM BROMIDE 10 MG/ML
INJECTION, SOLUTION INTRAVENOUS AS NEEDED
Status: DISCONTINUED | OUTPATIENT
Start: 2024-03-21 | End: 2024-03-21 | Stop reason: SURG

## 2024-03-21 RX ORDER — ACETAMINOPHEN 160 MG/5ML
650 SOLUTION ORAL EVERY 4 HOURS PRN
Status: DISCONTINUED | OUTPATIENT
Start: 2024-03-21 | End: 2024-03-24 | Stop reason: HOSPADM

## 2024-03-21 RX ORDER — DEXAMETHASONE SODIUM PHOSPHATE 4 MG/ML
INJECTION, SOLUTION INTRA-ARTICULAR; INTRALESIONAL; INTRAMUSCULAR; INTRAVENOUS; SOFT TISSUE AS NEEDED
Status: DISCONTINUED | OUTPATIENT
Start: 2024-03-21 | End: 2024-03-21 | Stop reason: SURG

## 2024-03-21 RX ORDER — FERROUS SULFATE 325(65) MG
325 TABLET ORAL 2 TIMES DAILY WITH MEALS
Status: DISCONTINUED | OUTPATIENT
Start: 2024-03-21 | End: 2024-03-24 | Stop reason: HOSPADM

## 2024-03-21 RX ORDER — FLUMAZENIL 0.1 MG/ML
0.2 INJECTION INTRAVENOUS AS NEEDED
Status: DISCONTINUED | OUTPATIENT
Start: 2024-03-21 | End: 2024-03-21 | Stop reason: HOSPADM

## 2024-03-21 RX ORDER — DOFETILIDE 0.5 MG/1
500 CAPSULE ORAL EVERY 12 HOURS
Status: DISCONTINUED | OUTPATIENT
Start: 2024-03-21 | End: 2024-03-24 | Stop reason: HOSPADM

## 2024-03-21 RX ORDER — LEVOTHYROXINE SODIUM 150 UG/1
150 TABLET ORAL
Status: DISCONTINUED | OUTPATIENT
Start: 2024-03-22 | End: 2024-03-24 | Stop reason: HOSPADM

## 2024-03-21 RX ORDER — SODIUM CHLORIDE 9 MG/ML
INJECTION, SOLUTION INTRAVENOUS CONTINUOUS PRN
Status: DISCONTINUED | OUTPATIENT
Start: 2024-03-21 | End: 2024-03-21 | Stop reason: SURG

## 2024-03-21 RX ORDER — PROMETHAZINE HYDROCHLORIDE 25 MG/1
25 TABLET ORAL ONCE AS NEEDED
Status: DISCONTINUED | OUTPATIENT
Start: 2024-03-21 | End: 2024-03-21 | Stop reason: HOSPADM

## 2024-03-21 RX ORDER — ACETAMINOPHEN 650 MG/1
650 SUPPOSITORY RECTAL EVERY 4 HOURS PRN
Status: DISCONTINUED | OUTPATIENT
Start: 2024-03-21 | End: 2024-03-24 | Stop reason: HOSPADM

## 2024-03-21 RX ORDER — IPRATROPIUM BROMIDE AND ALBUTEROL SULFATE 2.5; .5 MG/3ML; MG/3ML
3 SOLUTION RESPIRATORY (INHALATION) ONCE AS NEEDED
Status: DISCONTINUED | OUTPATIENT
Start: 2024-03-21 | End: 2024-03-21 | Stop reason: HOSPADM

## 2024-03-21 RX ORDER — ONDANSETRON 2 MG/ML
4 INJECTION INTRAMUSCULAR; INTRAVENOUS EVERY 6 HOURS PRN
Status: DISCONTINUED | OUTPATIENT
Start: 2024-03-21 | End: 2024-03-24 | Stop reason: HOSPADM

## 2024-03-21 RX ORDER — AMOXICILLIN 250 MG
2 CAPSULE ORAL NIGHTLY
Status: DISCONTINUED | OUTPATIENT
Start: 2024-03-22 | End: 2024-03-24 | Stop reason: HOSPADM

## 2024-03-21 RX ORDER — FENTANYL CITRATE 50 UG/ML
50 INJECTION, SOLUTION INTRAMUSCULAR; INTRAVENOUS
Status: DISCONTINUED | OUTPATIENT
Start: 2024-03-21 | End: 2024-03-21 | Stop reason: HOSPADM

## 2024-03-21 RX ORDER — NICOTINE POLACRILEX 4 MG
15 LOZENGE BUCCAL
Status: DISCONTINUED | OUTPATIENT
Start: 2024-03-21 | End: 2024-03-24 | Stop reason: HOSPADM

## 2024-03-21 RX ORDER — METOPROLOL SUCCINATE 100 MG/1
100 TABLET, EXTENDED RELEASE ORAL DAILY
Status: DISCONTINUED | OUTPATIENT
Start: 2024-03-22 | End: 2024-03-23

## 2024-03-21 RX ORDER — NITROGLYCERIN 0.4 MG/1
0.4 TABLET SUBLINGUAL
Status: DISCONTINUED | OUTPATIENT
Start: 2024-03-21 | End: 2024-03-24 | Stop reason: HOSPADM

## 2024-03-21 RX ADMIN — KETOROLAC TROMETHAMINE 30 MG: 30 INJECTION, SOLUTION INTRAMUSCULAR; INTRAVENOUS at 13:12

## 2024-03-21 RX ADMIN — Medication 3 ML: at 06:17

## 2024-03-21 RX ADMIN — MUPIROCIN 1 APPLICATION: 20 OINTMENT TOPICAL at 06:14

## 2024-03-21 RX ADMIN — ROCURONIUM BROMIDE 30 MG: 10 INJECTION, SOLUTION INTRAVENOUS at 08:54

## 2024-03-21 RX ADMIN — SODIUM CHLORIDE 3 G: 900 INJECTION INTRAVENOUS at 11:56

## 2024-03-21 RX ADMIN — HEPARIN SODIUM 3000 UNITS: 1000 INJECTION, SOLUTION INTRAVENOUS; SUBCUTANEOUS at 09:28

## 2024-03-21 RX ADMIN — METOPROLOL TARTRATE 5 MG: 1 INJECTION, SOLUTION INTRAVENOUS at 20:50

## 2024-03-21 RX ADMIN — PANTOPRAZOLE SODIUM 40 MG: 40 INJECTION, POWDER, LYOPHILIZED, FOR SOLUTION INTRAVENOUS at 17:56

## 2024-03-21 RX ADMIN — Medication 100 MCG: at 11:10

## 2024-03-21 RX ADMIN — HYDROCODONE BITARTRATE AND ACETAMINOPHEN 1 TABLET: 10; 325 TABLET ORAL at 14:07

## 2024-03-21 RX ADMIN — MORPHINE SULFATE 2 MG: 2 INJECTION, SOLUTION INTRAMUSCULAR; INTRAVENOUS at 17:23

## 2024-03-21 RX ADMIN — SODIUM CHLORIDE 3 G: 900 INJECTION INTRAVENOUS at 07:56

## 2024-03-21 RX ADMIN — Medication 100 MCG: at 09:02

## 2024-03-21 RX ADMIN — HYDROMORPHONE HYDROCHLORIDE 0.5 MG: 1 INJECTION, SOLUTION INTRAMUSCULAR; INTRAVENOUS; SUBCUTANEOUS at 14:08

## 2024-03-21 RX ADMIN — HEPARIN SODIUM 3000 UNITS: 1000 INJECTION, SOLUTION INTRAVENOUS; SUBCUTANEOUS at 10:21

## 2024-03-21 RX ADMIN — 0.12% CHLORHEXIDINE GLUCONATE 15 ML: 1.2 RINSE ORAL at 06:14

## 2024-03-21 RX ADMIN — METOPROLOL TARTRATE 5 MG: 1 INJECTION, SOLUTION INTRAVENOUS at 20:17

## 2024-03-21 RX ADMIN — FERROUS SULFATE TAB 325 MG (65 MG ELEMENTAL FE) 325 MG: 325 (65 FE) TAB at 17:57

## 2024-03-21 RX ADMIN — INSULIN LISPRO 4 UNITS: 100 INJECTION, SOLUTION INTRAVENOUS; SUBCUTANEOUS at 21:52

## 2024-03-21 RX ADMIN — ROCURONIUM BROMIDE 20 MG: 10 INJECTION, SOLUTION INTRAVENOUS at 10:57

## 2024-03-21 RX ADMIN — METHYLPREDNISOLONE SODIUM SUCCINATE 60 MG: 125 INJECTION, POWDER, FOR SOLUTION INTRAMUSCULAR; INTRAVENOUS at 17:56

## 2024-03-21 RX ADMIN — ATORVASTATIN CALCIUM 10 MG: 20 TABLET, FILM COATED ORAL at 21:55

## 2024-03-21 RX ADMIN — MAGNESIUM SULFATE HEPTAHYDRATE 2 G: 40 INJECTION, SOLUTION INTRAVENOUS at 20:36

## 2024-03-21 RX ADMIN — INSULIN LISPRO 2 UNITS: 100 INJECTION, SOLUTION INTRAVENOUS; SUBCUTANEOUS at 17:57

## 2024-03-21 RX ADMIN — ACETAMINOPHEN 1000 MG: 500 TABLET ORAL at 06:14

## 2024-03-21 RX ADMIN — DIGOXIN 250 MCG: 250 INJECTION, SOLUTION INTRAMUSCULAR; INTRAVENOUS; PARENTERAL at 20:23

## 2024-03-21 RX ADMIN — SODIUM CHLORIDE: 9 INJECTION, SOLUTION INTRAVENOUS at 06:46

## 2024-03-21 RX ADMIN — LIDOCAINE HYDROCHLORIDE 60 MG: 20 INJECTION, SOLUTION INFILTRATION; PERINEURAL at 07:09

## 2024-03-21 RX ADMIN — ROCURONIUM BROMIDE 70 MG: 10 INJECTION, SOLUTION INTRAVENOUS at 07:10

## 2024-03-21 RX ADMIN — HYDROCODONE BITARTRATE AND ACETAMINOPHEN 2 TABLET: 5; 325 TABLET ORAL at 21:52

## 2024-03-21 RX ADMIN — ONDANSETRON 4 MG: 2 INJECTION INTRAMUSCULAR; INTRAVENOUS at 12:29

## 2024-03-21 RX ADMIN — FENTANYL CITRATE 50 MCG: 50 INJECTION, SOLUTION INTRAMUSCULAR; INTRAVENOUS at 13:22

## 2024-03-21 RX ADMIN — DEXAMETHASONE SODIUM PHOSPHATE 4 MG: 4 INJECTION, SOLUTION INTRAMUSCULAR; INTRAVENOUS at 08:09

## 2024-03-21 RX ADMIN — Medication 100 MCG: at 08:12

## 2024-03-21 RX ADMIN — METOPROLOL SUCCINATE 100 MG: 50 TABLET, EXTENDED RELEASE ORAL at 06:14

## 2024-03-21 RX ADMIN — HYDROMORPHONE HYDROCHLORIDE 0.5 MG: 1 INJECTION, SOLUTION INTRAMUSCULAR; INTRAVENOUS; SUBCUTANEOUS at 14:26

## 2024-03-21 RX ADMIN — FAMOTIDINE 20 MG: 10 INJECTION INTRAVENOUS at 06:14

## 2024-03-21 RX ADMIN — FENTANYL CITRATE 50 MCG: 50 INJECTION, SOLUTION INTRAMUSCULAR; INTRAVENOUS at 06:54

## 2024-03-21 RX ADMIN — GABAPENTIN 300 MG: 300 CAPSULE ORAL at 20:58

## 2024-03-21 RX ADMIN — SUGAMMADEX 200 MG: 100 INJECTION, SOLUTION INTRAVENOUS at 12:55

## 2024-03-21 RX ADMIN — SODIUM CHLORIDE: 9 INJECTION, SOLUTION INTRAVENOUS at 07:48

## 2024-03-21 RX ADMIN — SODIUM CHLORIDE 3 G: 900 INJECTION INTRAVENOUS at 22:59

## 2024-03-21 RX ADMIN — ROCURONIUM BROMIDE 30 MG: 10 INJECTION, SOLUTION INTRAVENOUS at 11:09

## 2024-03-21 RX ADMIN — PROPOFOL 50 MG: 10 INJECTION, EMULSION INTRAVENOUS at 08:02

## 2024-03-21 RX ADMIN — PROPOFOL 200 MG: 10 INJECTION, EMULSION INTRAVENOUS at 07:09

## 2024-03-21 RX ADMIN — DOFETILIDE 500 MCG: 0.5 CAPSULE ORAL at 17:55

## 2024-03-21 RX ADMIN — HEPARIN SODIUM 10000 UNITS: 1000 INJECTION, SOLUTION INTRAVENOUS; SUBCUTANEOUS at 08:51

## 2024-03-21 RX ADMIN — METOPROLOL TARTRATE 25 MG: 25 TABLET, FILM COATED ORAL at 21:53

## 2024-03-21 RX ADMIN — HYDROMORPHONE HYDROCHLORIDE 0.5 MG: 1 INJECTION, SOLUTION INTRAMUSCULAR; INTRAVENOUS; SUBCUTANEOUS at 08:18

## 2024-03-21 NOTE — ANESTHESIA PROCEDURE NOTES
Airway  Date/Time: 3/21/2024 7:13 AM    General Information and Staff    Patient location during procedure: OR  Anesthesiologist: Juno Smith MD    Indications and Patient Condition    Preoxygenated: yes  Mask difficulty assessment: 3 - difficult mask (inadequate, unstable or two providers) +/- NMBA    Final Airway Details  Final airway type: endotracheal airway      Successful airway: EBT - double lumen left  Cuffed: yes   Successful intubation technique: video laryngoscopy  Facilitating devices/methods: intubating stylet  Endotracheal tube insertion site: oral  Blade: CMAC  Blade size: D  EBT DL size (fr): 39  Cormack-Lehane Classification: grade I - full view of glottis  Placement verified by: bronchoscopy and capnometry   Measured from: teeth  Number of attempts at approach: 1  Assessment: lips, teeth, and gum same as pre-op and atraumatic intubation    Additional Comments  Intubated with CMAC D, GALO positioned with flexible fiberoptic bronchoscopy.  Probably wouldn't be difficult DL but mask ventilation was easier with 2 providers than without.

## 2024-03-21 NOTE — ANESTHESIA PROCEDURE NOTES
Peripheral IV    Patient location during procedure: OR  Start time: 3/21/2024 7:40 AM  End time: 3/21/2024 7:42 AM  Performed By   Anesthesiologist: Juno Smith MD  Peripheral IV Prep   Patient position: supine   Prep: ChloraPrep  Patient monitoring: heart rate, cardiac monitor and continuous pulse ox  Peripheral IV Procedure   Laterality:left  Location:  Antecubital  Catheter size: 18 G  Guidance: ultrasound guided          Post Assessment   Dressing Type: transparent.    IV Dressing/Site: clean, dry and intact

## 2024-03-21 NOTE — ANESTHESIA PROCEDURE NOTES
Diagnostic IntraOp Luis    Procedure Performed: Diagnostic IntraOp Luis       Start Time:  3/21/2024 8:00 AM       End Time:   3/21/2024 8:23 AM      General Procedure Information  Physician Requesting Echo: Edgardo Clifton MD    Echocardiographic and Doppler Measurements    Ventricles    Left Ventricle:  Global Function mildly impaired.  Ejection Fraction 45%.          Valves    Aortic Valve:  Stenosis not present.  Regurgitation absent.      Mitral Valve:  Stenosis not present.  Regurgitation mild.      Tricuspid Valve:  Regurgitation mild.            Atria      Left Atrium:  Size dilated.  Left atrial appendage normal.                  Anesthesia Information      Echocardiogram Comments:       Diagnosis: non-rheumatic mitral regurgitation.  After intervention, no pericardial effusion, left atrial appendage successfully ligated, minimal residual stump

## 2024-03-21 NOTE — NURSING NOTE
Spoke with Dr. Smith in regards to patient home beta blocker. Patient did take home dose yesterday 3/20/24 at 0600. HR is 90's-120's BP is stable. Dr. Smith informed that patient did take home Tykosin but nothing else per APRN instructions; instructed to order home dose of metoprolol and give in PreOp.    Randall Mosqueda RN  03/21/24  06:08 EDT

## 2024-03-21 NOTE — ANESTHESIA PROCEDURE NOTES
Arterial Line      Patient reassessed immediately prior to procedure    Start time: 3/21/2024 7:00 AM  Stop Time:3/21/2024 7:03 AM       Performed By   Anesthesiologist: Juno Smith MD   Preanesthetic Checklist  Completed: patient identified, risks and benefits discussed, surgical consent, pre-op evaluation and timeout performed  Arterial Line Prep    Sterile Tech: cap, gloves and mask  Prep: alcohol swabs and ChloraPrep  Patient monitoring: blood pressure monitoring, continuous pulse oximetry and EKG  Arterial Line Procedure   Laterality:left  Location:  radial artery  Catheter size: 20 G   Guidance: ultrasound guided  PROCEDURE NOTE/ULTRASOUND INTERPRETATION.  Using ultrasound guidance the potential vascular sites for insertion of the catheter were visualized to determine the patency of the vessel to be used for vascular access.  After selecting the appropriate site for insertion, the needle was visualized under ultrasound being inserted into the radial artery, followed by ultrasound confirmation of wire and catheter placement. There were no abnormalities seen on ultrasound; an image was taken; and the patient tolerated the procedure with no complications.   Number of attempts: 2  Successful placement: yes Images: still images not obtained  Post Assessment   Dressing Type: occlusive dressing applied, secured with tape and wrist guard applied.   Complications no   Patient Tolerance: patient tolerated the procedure well with no apparent complications  Additional Notes  Ultrasound Interpretation:  Using ultrasound guidance the potential vascular sites for insertion of the catheter were visualized to determine the patency of the vessel to be used for vascular access.  After selecting the appropriate site for insertion, the needle was visualized under ultrasound being inserted into the vessel, followed by ultrasound confirmation of wire and catheter placement.  There were no abnormalities seen on ultrasound;  an image was taken/ and the patient tolerated the procedure with no complications.

## 2024-03-21 NOTE — ANESTHESIA POSTPROCEDURE EVALUATION
"Patient: Marcus Simon    Procedure Summary       Date: 03/21/24 Room / Location: [address] CARDIOVASCULAR OPERATING ROOM    Anesthesia Start: 0646 Anesthesia Stop: 1319    Procedure: OPEN HEART CONVERGENT HYBRID ABLATION PROCEDURE WITH LEFT ATRIAL APPENDAGE CLIPPING TRANSESOPHAGEAL ECHOCARDIOGRAM WITH ANESTHESIA (Chest) Diagnosis:       Atrial fibrillation, persistent      (Atrial fibrillation, persistent [I48.19])    Surgeons: Edgardo Clifton MD Provider: Juno Smith MD    Anesthesia Type: general, Ernestina, CVL ASA Status: 4            Anesthesia Type: general, [place], CVL    Vitals  Vitals Value Taken Time   /85 03/21/24 1415   Temp 36.6 °C (97.8 °F) 03/21/24 1325   Pulse 125 03/21/24 1418   Resp 13 03/21/24 1325   SpO2 92 % 03/21/24 1418   Vitals shown include unfiled device data.        Post Anesthesia Care and Evaluation    Patient location during evaluation: bedside  Patient participation: complete - patient participated  Level of consciousness: awake  Pain management: adequate    Airway patency: patent  Anesthetic complications: No anesthetic complications    Cardiovascular status: acceptable  Respiratory status: acceptable  Hydration status: acceptable    Comments: /81   Pulse (!) 122   Temp 36.6 °C (97.8 °F) (Oral)   Resp 13   Ht 193 cm (76\")   Wt (!) 159 kg (350 lb 6.4 oz)   SpO2 93%   BMI 42.65 kg/m²     "

## 2024-03-21 NOTE — PLAN OF CARE
Goal Outcome Evaluation:  Plan of Care Reviewed With: patient, family           Outcome Evaluation: admission in progress

## 2024-03-22 ENCOUNTER — APPOINTMENT (OUTPATIENT)
Dept: GENERAL RADIOLOGY | Facility: HOSPITAL | Age: 59
DRG: 229 | End: 2024-03-22
Payer: COMMERCIAL

## 2024-03-22 LAB
ALBUMIN SERPL-MCNC: 3.6 G/DL (ref 3.5–5.2)
ANION GAP SERPL CALCULATED.3IONS-SCNC: 11.4 MMOL/L (ref 5–15)
ANION GAP SERPL CALCULATED.3IONS-SCNC: 12.6 MMOL/L (ref 5–15)
BASOPHILS # BLD AUTO: 0.01 10*3/MM3 (ref 0–0.2)
BASOPHILS NFR BLD AUTO: 0.1 % (ref 0–1.5)
BUN SERPL-MCNC: 17 MG/DL (ref 6–20)
BUN SERPL-MCNC: 18 MG/DL (ref 6–20)
BUN/CREAT SERPL: 20.5 (ref 7–25)
BUN/CREAT SERPL: 26.1 (ref 7–25)
CA-I BLD-MCNC: 4.9 MG/DL (ref 4.6–5.4)
CA-I SERPL ISE-MCNC: 1.23 MMOL/L (ref 1.15–1.35)
CALCIUM SPEC-SCNC: 8.7 MG/DL (ref 8.6–10.5)
CALCIUM SPEC-SCNC: 8.8 MG/DL (ref 8.6–10.5)
CHLORIDE SERPL-SCNC: 102 MMOL/L (ref 98–107)
CHLORIDE SERPL-SCNC: 104 MMOL/L (ref 98–107)
CO2 SERPL-SCNC: 20.4 MMOL/L (ref 22–29)
CO2 SERPL-SCNC: 24.6 MMOL/L (ref 22–29)
CREAT SERPL-MCNC: 0.69 MG/DL (ref 0.76–1.27)
CREAT SERPL-MCNC: 0.83 MG/DL (ref 0.76–1.27)
DEPRECATED RDW RBC AUTO: 46.1 FL (ref 37–54)
EGFRCR SERPLBLD CKD-EPI 2021: 101.4 ML/MIN/1.73
EGFRCR SERPLBLD CKD-EPI 2021: 107.3 ML/MIN/1.73
EOSINOPHIL # BLD AUTO: 0 10*3/MM3 (ref 0–0.4)
EOSINOPHIL NFR BLD AUTO: 0 % (ref 0.3–6.2)
ERYTHROCYTE [DISTWIDTH] IN BLOOD BY AUTOMATED COUNT: 12.1 % (ref 12.3–15.4)
GLUCOSE BLDC GLUCOMTR-MCNC: 167 MG/DL (ref 70–130)
GLUCOSE BLDC GLUCOMTR-MCNC: 172 MG/DL (ref 70–130)
GLUCOSE BLDC GLUCOMTR-MCNC: 183 MG/DL (ref 70–130)
GLUCOSE BLDC GLUCOMTR-MCNC: 184 MG/DL (ref 70–130)
GLUCOSE SERPL-MCNC: 207 MG/DL (ref 65–99)
GLUCOSE SERPL-MCNC: 239 MG/DL (ref 65–99)
HCT VFR BLD AUTO: 45 % (ref 37.5–51)
HGB BLD-MCNC: 15.3 G/DL (ref 13–17.7)
IMM GRANULOCYTES # BLD AUTO: 0.06 10*3/MM3 (ref 0–0.05)
IMM GRANULOCYTES NFR BLD AUTO: 0.4 % (ref 0–0.5)
LYMPHOCYTES # BLD AUTO: 0.52 10*3/MM3 (ref 0.7–3.1)
LYMPHOCYTES NFR BLD AUTO: 3.5 % (ref 19.6–45.3)
MAGNESIUM SERPL-MCNC: 2.3 MG/DL (ref 1.6–2.6)
MCH RBC QN AUTO: 35.2 PG (ref 26.6–33)
MCHC RBC AUTO-ENTMCNC: 34 G/DL (ref 31.5–35.7)
MCV RBC AUTO: 103.4 FL (ref 79–97)
MONOCYTES # BLD AUTO: 0.74 10*3/MM3 (ref 0.1–0.9)
MONOCYTES NFR BLD AUTO: 4.9 % (ref 5–12)
NEUTROPHILS NFR BLD AUTO: 13.71 10*3/MM3 (ref 1.7–7)
NEUTROPHILS NFR BLD AUTO: 91.1 % (ref 42.7–76)
NRBC BLD AUTO-RTO: 0 /100 WBC (ref 0–0.2)
PHOSPHATE SERPL-MCNC: 4.1 MG/DL (ref 2.5–4.5)
PLATELET # BLD AUTO: 164 10*3/MM3 (ref 140–450)
PMV BLD AUTO: 9.9 FL (ref 6–12)
POTASSIUM SERPL-SCNC: 4.4 MMOL/L (ref 3.5–5.2)
POTASSIUM SERPL-SCNC: 4.7 MMOL/L (ref 3.5–5.2)
QT INTERVAL: 331 MS
QTC INTERVAL: 460 MS
RBC # BLD AUTO: 4.35 10*6/MM3 (ref 4.14–5.8)
SODIUM SERPL-SCNC: 135 MMOL/L (ref 136–145)
SODIUM SERPL-SCNC: 140 MMOL/L (ref 136–145)
WBC NRBC COR # BLD AUTO: 15.04 10*3/MM3 (ref 3.4–10.8)

## 2024-03-22 PROCEDURE — 25010000002 KETOROLAC TROMETHAMINE PER 15 MG: Performed by: NURSE PRACTITIONER

## 2024-03-22 PROCEDURE — 63710000001 INSULIN LISPRO (HUMAN) PER 5 UNITS: Performed by: NURSE PRACTITIONER

## 2024-03-22 PROCEDURE — 83735 ASSAY OF MAGNESIUM: CPT | Performed by: INTERNAL MEDICINE

## 2024-03-22 PROCEDURE — 82330 ASSAY OF CALCIUM: CPT | Performed by: NURSE PRACTITIONER

## 2024-03-22 PROCEDURE — 71045 X-RAY EXAM CHEST 1 VIEW: CPT

## 2024-03-22 PROCEDURE — 25010000002 CEFAZOLIN 3 G RECONSTITUTED SOLUTION 1 EACH VIAL: Performed by: NURSE PRACTITIONER

## 2024-03-22 PROCEDURE — 80069 RENAL FUNCTION PANEL: CPT | Performed by: NURSE PRACTITIONER

## 2024-03-22 PROCEDURE — 93010 ELECTROCARDIOGRAM REPORT: CPT | Performed by: INTERNAL MEDICINE

## 2024-03-22 PROCEDURE — 80048 BASIC METABOLIC PNL TOTAL CA: CPT | Performed by: NURSE PRACTITIONER

## 2024-03-22 PROCEDURE — 93005 ELECTROCARDIOGRAM TRACING: CPT | Performed by: NURSE PRACTITIONER

## 2024-03-22 PROCEDURE — 99254 IP/OBS CNSLTJ NEW/EST MOD 60: CPT | Performed by: NURSE PRACTITIONER

## 2024-03-22 PROCEDURE — 25010000002 METHYLPREDNISOLONE PER 125 MG: Performed by: NURSE PRACTITIONER

## 2024-03-22 PROCEDURE — 25010000002 FUROSEMIDE PER 20 MG: Performed by: NURSE PRACTITIONER

## 2024-03-22 PROCEDURE — 25010000002 MAGNESIUM SULFATE 2 GM/50ML SOLUTION

## 2024-03-22 PROCEDURE — 82948 REAGENT STRIP/BLOOD GLUCOSE: CPT

## 2024-03-22 PROCEDURE — 25010000002 DIGOXIN PER 500 MCG: Performed by: NURSE PRACTITIONER

## 2024-03-22 PROCEDURE — 97530 THERAPEUTIC ACTIVITIES: CPT

## 2024-03-22 PROCEDURE — 25010000002 ENOXAPARIN PER 10 MG: Performed by: NURSE PRACTITIONER

## 2024-03-22 PROCEDURE — 97161 PT EVAL LOW COMPLEX 20 MIN: CPT

## 2024-03-22 PROCEDURE — 85025 COMPLETE CBC W/AUTO DIFF WBC: CPT | Performed by: NURSE PRACTITIONER

## 2024-03-22 PROCEDURE — 63710000001 METHYLPREDNISOLONE 4 MG TABLET THERAPY PACK 21 EACH DISP PACK: Performed by: NURSE PRACTITIONER

## 2024-03-22 RX ORDER — METHYLPREDNISOLONE 4 MG
8 TABLET, DOSE PACK ORAL
Status: COMPLETED | OUTPATIENT
Start: 2024-03-22 | End: 2024-03-22

## 2024-03-22 RX ORDER — FUROSEMIDE 10 MG/ML
40 INJECTION INTRAMUSCULAR; INTRAVENOUS ONCE
Status: COMPLETED | OUTPATIENT
Start: 2024-03-22 | End: 2024-03-22

## 2024-03-22 RX ORDER — METHYLPREDNISOLONE 4 MG
4 TABLET, DOSE PACK ORAL
Status: COMPLETED | OUTPATIENT
Start: 2024-03-22 | End: 2024-03-22

## 2024-03-22 RX ORDER — INSULIN LISPRO 100 [IU]/ML
3-14 INJECTION, SOLUTION INTRAVENOUS; SUBCUTANEOUS
Status: DISCONTINUED | OUTPATIENT
Start: 2024-03-22 | End: 2024-03-24 | Stop reason: HOSPADM

## 2024-03-22 RX ORDER — METHYLPREDNISOLONE 4 MG
4 TABLET, DOSE PACK ORAL
Status: DISCONTINUED | OUTPATIENT
Start: 2024-03-27 | End: 2024-03-24 | Stop reason: HOSPADM

## 2024-03-22 RX ORDER — METHYLPREDNISOLONE 4 MG
4 TABLET, DOSE PACK ORAL
Status: DISCONTINUED | OUTPATIENT
Start: 2024-03-26 | End: 2024-03-24 | Stop reason: HOSPADM

## 2024-03-22 RX ORDER — METHYLPREDNISOLONE 4 MG
4 TABLET, DOSE PACK ORAL
Status: DISCONTINUED | OUTPATIENT
Start: 2024-03-25 | End: 2024-03-24 | Stop reason: HOSPADM

## 2024-03-22 RX ORDER — DIGOXIN 0.25 MG/ML
500 INJECTION INTRAMUSCULAR; INTRAVENOUS ONCE
Status: COMPLETED | OUTPATIENT
Start: 2024-03-22 | End: 2024-03-22

## 2024-03-22 RX ORDER — DIGOXIN 0.25 MG/ML
250 INJECTION INTRAMUSCULAR; INTRAVENOUS ONCE
Status: COMPLETED | OUTPATIENT
Start: 2024-03-22 | End: 2024-03-22

## 2024-03-22 RX ORDER — METHYLPREDNISOLONE 4 MG
8 TABLET, DOSE PACK ORAL
Status: COMPLETED | OUTPATIENT
Start: 2024-03-23 | End: 2024-03-23

## 2024-03-22 RX ORDER — METHYLPREDNISOLONE 4 MG
4 TABLET, DOSE PACK ORAL
Status: DISCONTINUED | OUTPATIENT
Start: 2024-03-24 | End: 2024-03-24 | Stop reason: HOSPADM

## 2024-03-22 RX ORDER — METHYLPREDNISOLONE 4 MG
4 TABLET, DOSE PACK ORAL
Status: COMPLETED | OUTPATIENT
Start: 2024-03-23 | End: 2024-03-23

## 2024-03-22 RX ADMIN — METOPROLOL TARTRATE 5 MG: 1 INJECTION, SOLUTION INTRAVENOUS at 04:33

## 2024-03-22 RX ADMIN — METHYLPREDNISOLONE 4 MG: 4 TABLET ORAL at 19:33

## 2024-03-22 RX ADMIN — DOCUSATE SODIUM 50MG AND SENNOSIDES 8.6MG 2 TABLET: 8.6; 5 TABLET, FILM COATED ORAL at 21:52

## 2024-03-22 RX ADMIN — HYDROCODONE BITARTRATE AND ACETAMINOPHEN 2 TABLET: 5; 325 TABLET ORAL at 21:52

## 2024-03-22 RX ADMIN — DIGOXIN 250 MCG: 250 INJECTION, SOLUTION INTRAMUSCULAR; INTRAVENOUS; PARENTERAL at 17:19

## 2024-03-22 RX ADMIN — ENOXAPARIN SODIUM 40 MG: 100 INJECTION SUBCUTANEOUS at 17:20

## 2024-03-22 RX ADMIN — INSULIN LISPRO 3 UNITS: 100 INJECTION, SOLUTION INTRAVENOUS; SUBCUTANEOUS at 12:21

## 2024-03-22 RX ADMIN — HYDROCODONE BITARTRATE AND ACETAMINOPHEN 2 TABLET: 5; 325 TABLET ORAL at 12:36

## 2024-03-22 RX ADMIN — SODIUM CHLORIDE 3 G: 900 INJECTION INTRAVENOUS at 21:53

## 2024-03-22 RX ADMIN — SODIUM CHLORIDE 3 G: 900 INJECTION INTRAVENOUS at 06:21

## 2024-03-22 RX ADMIN — SODIUM CHLORIDE 3 G: 900 INJECTION INTRAVENOUS at 12:36

## 2024-03-22 RX ADMIN — FERROUS SULFATE TAB 325 MG (65 MG ELEMENTAL FE) 325 MG: 325 (65 FE) TAB at 17:23

## 2024-03-22 RX ADMIN — FUROSEMIDE 40 MG: 10 INJECTION, SOLUTION INTRAMUSCULAR; INTRAVENOUS at 08:19

## 2024-03-22 RX ADMIN — INSULIN LISPRO 3 UNITS: 100 INJECTION, SOLUTION INTRAVENOUS; SUBCUTANEOUS at 21:53

## 2024-03-22 RX ADMIN — DIGOXIN 500 MCG: 250 INJECTION, SOLUTION INTRAMUSCULAR; INTRAVENOUS; PARENTERAL at 08:19

## 2024-03-22 RX ADMIN — DOFETILIDE 500 MCG: 0.5 CAPSULE ORAL at 17:23

## 2024-03-22 RX ADMIN — PANTOPRAZOLE SODIUM 40 MG: 40 TABLET, DELAYED RELEASE ORAL at 06:40

## 2024-03-22 RX ADMIN — FERROUS SULFATE TAB 325 MG (65 MG ELEMENTAL FE) 325 MG: 325 (65 FE) TAB at 08:18

## 2024-03-22 RX ADMIN — MAGNESIUM SULFATE HEPTAHYDRATE 2 G: 40 INJECTION, SOLUTION INTRAVENOUS at 10:16

## 2024-03-22 RX ADMIN — MAGNESIUM SULFATE HEPTAHYDRATE 2 G: 40 INJECTION, SOLUTION INTRAVENOUS at 17:20

## 2024-03-22 RX ADMIN — DOFETILIDE 500 MCG: 0.5 CAPSULE ORAL at 10:16

## 2024-03-22 RX ADMIN — LEVOTHYROXINE SODIUM 150 MCG: 150 TABLET ORAL at 06:18

## 2024-03-22 RX ADMIN — METHYLPREDNISOLONE 8 MG: 4 TABLET ORAL at 22:11

## 2024-03-22 RX ADMIN — GABAPENTIN 300 MG: 300 CAPSULE ORAL at 21:52

## 2024-03-22 RX ADMIN — INSULIN LISPRO 2 UNITS: 100 INJECTION, SOLUTION INTRAVENOUS; SUBCUTANEOUS at 06:39

## 2024-03-22 RX ADMIN — METHYLPREDNISOLONE SODIUM SUCCINATE 60 MG: 125 INJECTION, POWDER, FOR SOLUTION INTRAMUSCULAR; INTRAVENOUS at 00:04

## 2024-03-22 RX ADMIN — INSULIN LISPRO 3 UNITS: 100 INJECTION, SOLUTION INTRAVENOUS; SUBCUTANEOUS at 17:20

## 2024-03-22 RX ADMIN — METOPROLOL SUCCINATE 100 MG: 100 TABLET, EXTENDED RELEASE ORAL at 08:18

## 2024-03-22 RX ADMIN — KETOROLAC TROMETHAMINE 15 MG: 15 INJECTION, SOLUTION INTRAMUSCULAR; INTRAVENOUS at 23:44

## 2024-03-22 RX ADMIN — METHYLPREDNISOLONE 4 MG: 4 TABLET ORAL at 12:21

## 2024-03-22 RX ADMIN — GABAPENTIN 300 MG: 300 CAPSULE ORAL at 08:20

## 2024-03-22 RX ADMIN — METOPROLOL TARTRATE 5 MG: 1 INJECTION, SOLUTION INTRAVENOUS at 23:43

## 2024-03-22 RX ADMIN — METHYLPREDNISOLONE 8 MG: 4 TABLET ORAL at 10:14

## 2024-03-22 RX ADMIN — HYDROCODONE BITARTRATE AND ACETAMINOPHEN 2 TABLET: 5; 325 TABLET ORAL at 04:33

## 2024-03-22 RX ADMIN — ATORVASTATIN CALCIUM 10 MG: 20 TABLET, FILM COATED ORAL at 21:52

## 2024-03-22 RX ADMIN — METOPROLOL TARTRATE 5 MG: 1 INJECTION, SOLUTION INTRAVENOUS at 00:01

## 2024-03-22 NOTE — PROGRESS NOTES
Notified while on call that patient is having Afib RVR and runs of VT of 6 beats. Patient has difficult to control Afib and also PVCs. Admitted for surgical ablation today. Patient is on Tikosyn and this has been continued it appears. IV metoprolol has been ordered x 1. I ordered 250 mcg digoxin x1 because of soft BP, though, would not order additional doses as Tikosyn has been continued. Would also hold off on amiodarone at this time.     EKGs now similar to preoperative EKG but QT a bit longer though this may be due to fast HR.     I ordered an additional 25 mg metoprolol tartrate to be given tonight for better HR control. I’ve also ordered some prn IV metoprolol.     JOESPH Armenta

## 2024-03-22 NOTE — PROGRESS NOTES
" LOS: 1 day   Patient Care Team:  Jackson Vázquez MD as PCP - General  Jackson Vázquez MD as PCP - Family Medicine  Ron Cowan MD as Consulting Physician (Cardiology)    Chief Complaint: post op    Subjective:  Symptoms:  He reports chest pain.  No shortness of breath.    Diet:  Adequate intake.  No nausea or vomiting.    Activity level: Normal with assistance.    Pain:  He complains of pain that is mild.  Pain is well controlled.        Vital Signs  Temp:  [97.6 °F (36.4 °C)-97.9 °F (36.6 °C)] 97.8 °F (36.6 °C)  Heart Rate:  [110-147] 126  Resp:  [13-16] 16  BP: ()/() 114/78  Body mass index is 43.11 kg/m².    Intake/Output Summary (Last 24 hours) at 3/22/2024 0723  Last data filed at 3/22/2024 0659  Gross per 24 hour   Intake 1840 ml   Output 1449 ml   Net 391 ml     No intake/output data recorded.    Chest tube drainage last 8 hours: 250        03/21/24  0527 03/22/24  0654   Weight: (!) 159 kg (350 lb 6.4 oz) (!) 161 kg (354 lb 2.7 oz)     Objective:  General Appearance:  Comfortable and in no acute distress.    Vital signs: (most recent): Blood pressure 119/77, pulse (!) 132, temperature 98.1 °F (36.7 °C), temperature source Oral, resp. rate 16, height 193 cm (76\"), weight (!) 161 kg (354 lb 2.7 oz), SpO2 93%.  Vital signs are normal.  No fever.    Output: Producing urine and no stool output.    Lungs:  Normal effort and normal respiratory rate.  There are decreased breath sounds.    Heart: Tachycardia.  Irregular rhythm.    Abdomen: Abdomen is soft.  Bowel sounds are normal.     Extremities: There is dependent edema.    Pulses: Distal pulses are intact.    Neurological: Patient is alert and oriented to person, place and time.    Skin:  Warm and dry.  (Left chest incisions clean and dry with skin glue)          Results Review:        WBC WBC   Date Value Ref Range Status   03/22/2024 15.04 (H) 3.40 - 10.80 10*3/mm3 Final   03/21/2024 9.70 3.40 - 10.80 10*3/mm3 Final   03/19/2024 4.85 " 3.40 - 10.80 10*3/mm3 Final      HGB Hemoglobin   Date Value Ref Range Status   03/22/2024 15.3 13.0 - 17.7 g/dL Final   03/21/2024 15.3 13.0 - 17.7 g/dL Final   03/21/2024 14.6 12.0 - 17.0 g/dL Final   03/21/2024 13.6 12.0 - 17.0 g/dL Final   03/21/2024 13.3 12.0 - 17.0 g/dL Final   03/19/2024 15.5 13.0 - 17.7 g/dL Final      HCT Hematocrit   Date Value Ref Range Status   03/22/2024 45.0 37.5 - 51.0 % Final   03/21/2024 44.0 37.5 - 51.0 % Final   03/21/2024 43 38 - 51 % Final   03/21/2024 40 38 - 51 % Final   03/21/2024 39 38 - 51 % Final   03/19/2024 45.4 37.5 - 51.0 % Final      Platelets Platelets   Date Value Ref Range Status   03/22/2024 164 140 - 450 10*3/mm3 Final   03/21/2024 168 140 - 450 10*3/mm3 Final   03/19/2024 179 140 - 450 10*3/mm3 Final        PT/INR:    Protime   Date Value Ref Range Status   03/19/2024 14.0 11.7 - 14.2 Seconds Final   /  INR   Date Value Ref Range Status   03/19/2024 1.06 0.90 - 1.10 Final       Sodium Sodium   Date Value Ref Range Status   03/22/2024 140 136 - 145 mmol/L Final   03/21/2024 140 136 - 145 mmol/L Final   03/19/2024 141 136 - 145 mmol/L Final      Potassium Potassium   Date Value Ref Range Status   03/22/2024 4.7 3.5 - 5.2 mmol/L Final   03/21/2024 4.7 3.5 - 5.2 mmol/L Final   03/21/2024 4.2 3.5 - 5.2 mmol/L Final   03/19/2024 3.9 3.5 - 5.2 mmol/L Final      Chloride Chloride   Date Value Ref Range Status   03/22/2024 104 98 - 107 mmol/L Final   03/21/2024 106 98 - 107 mmol/L Final   03/19/2024 102 98 - 107 mmol/L Final      Bicarbonate CO2   Date Value Ref Range Status   03/22/2024 24.6 22.0 - 29.0 mmol/L Final   03/21/2024 21.0 (L) 22.0 - 29.0 mmol/L Final   03/19/2024 30.0 (H) 22.0 - 29.0 mmol/L Final      BUN BUN   Date Value Ref Range Status   03/22/2024 17 6 - 20 mg/dL Final   03/21/2024 17 6 - 20 mg/dL Final   03/19/2024 14 6 - 20 mg/dL Final      Creatinine Creatinine   Date Value Ref Range Status   03/22/2024 0.83 0.76 - 1.27 mg/dL Final   03/21/2024 0.71  (L) 0.76 - 1.27 mg/dL Final   03/19/2024 0.88 0.76 - 1.27 mg/dL Final      Calcium Calcium   Date Value Ref Range Status   03/22/2024 8.8 8.6 - 10.5 mg/dL Final   03/21/2024 8.8 8.6 - 10.5 mg/dL Final   03/19/2024 9.3 8.6 - 10.5 mg/dL Final      Magnesium Magnesium   Date Value Ref Range Status   03/22/2024 2.3 1.6 - 2.6 mg/dL Final   03/21/2024 1.8 1.6 - 2.6 mg/dL Final   03/19/2024 1.6 1.6 - 2.6 mg/dL Final          atorvastatin, 10 mg, Oral, Nightly  ceFAZolin, 3 g, Intravenous, Q8H  dofetilide, 500 mcg, Oral, Q12H  enoxaparin, 40 mg, Subcutaneous, Q24H  ferrous sulfate, 325 mg, Oral, BID With Meals  furosemide, 40 mg, Intravenous, Once  gabapentin, 300 mg, Oral, BID  insulin lispro, 2-9 Units, Subcutaneous, 4x Daily AC & at Bedtime  levothyroxine, 150 mcg, Oral, Q AM  magnesium sulfate, 2 g, Intravenous, TID  methylPREDNISolone, 4 mg, Oral, After Lunch  methylPREDNISolone, 4 mg, Oral, After Dinner  [START ON 3/23/2024] methylPREDNISolone, 4 mg, Oral, TID Around Food  [START ON 3/24/2024] methylPREDNISolone, 4 mg, Oral, 4x Daily Taper  [START ON 3/25/2024] methylPREDNISolone, 4 mg, Oral, TID Around Food  [START ON 3/26/2024] methylPREDNISolone, 4 mg, Oral, Before Breakfast  [START ON 3/26/2024] methylPREDNISolone, 4 mg, Oral, Tonight  [START ON 3/27/2024] methylPREDNISolone, 4 mg, Oral, Before Breakfast  methylPREDNISolone, 8 mg, Oral, Before Breakfast  methylPREDNISolone, 8 mg, Oral, Tonight  [START ON 3/23/2024] methylPREDNISolone, 8 mg, Oral, Tonight  metoprolol succinate XL, 100 mg, Oral, Daily  pantoprazole, 40 mg, Oral, QAM  senna-docusate sodium, 2 tablet, Oral, Nightly         Assessment & Plan  - persistent atrial fibrillation with hx of PVI/multiple cardioversions--on Tikosyn; LD Eliquis 3/15 s/p convergent procedure/ANA occlusion POD#1 [place]  - hypertension  - hyperlipidemia--statin therapy  - hypothyroidism  - mild non-obstructive CAD  - DM II  - DDD  - hx of TIA  - obesity class 3 with  previous gastric bypass in 1984; Ozempic on hold  - leukocytosis--reactive/steroid administration    Looks good this morning, up in the chair. Pain controlled on current regimen  Remains in atrial fibrillation with rates in the 120s, which is his baseline. He did have NSVT overnight, and was given IV digoxin/IV lopressor.QTC last night was 534. Morning Tikosyn held until seen by EP  Transition steroids to oral dosing  With increased chest tube output overnight, will leave another day  IV diurese  Discontinue gamble cathter this afternoon  Mobilize/encourage pulmonary toilet--add flutter  Continue routine care    Martha Bonner, JOESPH  03/22/24  07:23 EDT

## 2024-03-22 NOTE — PLAN OF CARE
Goal Outcome Evaluation:  Plan of Care Reviewed With: patient        Progress: improving  Outcome Evaluation: VSS; A&Ox4. Pt with multiple runs of NSVT during the day and into the night; see calls and orders placed for Lopressor and digoxin. Pt remains Afib running an average rate of 125. Pt remains 3LNC. Pt has CT marked @480 w 250 out over night. Tube is oozing and dressing was changed once during the night. Pt with c/o of pain controlled by prn pain med. Pt expresses no additional needs at this time. Plan of care is ongoing.

## 2024-03-22 NOTE — OP NOTE
Operative Report    DATE OF OPERATION: 03/21/24    Pre-operative Diagnosis; Persistent atrial fibrillation     Post-operative Diagnosis: No change from Preoperative Diagnosis    Surgeon: Edgardo Clifton MD    Assistant: Jaylene GOFF First assistant required for complexity of the case and to assist with retraction and operating the endoscope    Anesthesia Type: General    Operation/Procedure:   MINIMALLY INVASIVE EPICARDIAL ABLATION POSTERIOR WALL OF LEFT ATRIUM VIA SUB-XYPHOID APPROACH,   EXPLORATORY LEFT VAT WITH EPICARDIAL OCCLUSION OF LEFT ATRIAL APPENDAGE WITH PLACEMENT OF 45mm ATRICLIP - Genera. Use of endoscope  Electrical Cardioversion    Findings/Complications: No complications, atriclip applied without issue postoperative GALINA confirmed occlusion of left atrial appendage. Unsuccessful Afib conversion after attempted cardioversion    Description of procedure:   Patient was taken to the OR and placed in a supine position. He was induced with general anesthesia and a double lumen ETT was placed without difficulty. Position was confirmed bronchoscopically. Radial a-line and a central line was placed for hemodynamic monitoring. The patient was then positioned, an esophageal temperature probe was placed and its position confirmed with fluoro. The patient was then prepped and draped, a timeout was performed and appropriate SCIP antibiotics were given. Preoperative GALINA confirmed thrombus free ANA.    A standard subxiphoid incision was made and carried sharply through the subcu fat and the fascia was opened in the midline. The pericardium was identified and incised. The trocar was then inserted followed by the 5mm scope.With endoscope guidance we then identified the left and right inferior pulmonary veins to get our landmarks. The probe was then inserted and beginning on the right, the posterior left atrial wall was sequentially ablated to the left veins. Multiple rows of ablation were created, we then  added additional lines were there were some small gaps along the most superior line. There appeared to be complete continuity along the entire posterior wall. The trocar was removed and the fascia, sq, and skin were closed in separate layers.    We then collapsed the left lung, and a 5 mm port was inserted about the 2nd interspace along the midclavicular line. Under thorascopic guidance using the endoscope,  the remaining two ports were placed along the posterior axillary line. We were able to open the pericardium and visualize the Left atrial appendage. The base of the ANA was sized, and the atriclip was carefully applied. Once we confirmed externally that we were at the base and confirmed with GALINA guidance the device was fired. The patient was then attempted to cardioverted but unsuccessfully. A 24 fr CT was place through one of the port sites and sutured to the skin. Wounds were closed in multiple layers. The patient was awakened and extubated and taken to the PACU in stable condition.    Specimens: N/A  Estimated Blood Loss: less than 100  Fluids/Drains: 24 fr L pleural tube
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
## 2024-03-22 NOTE — CASE MANAGEMENT/SOCIAL WORK
Discharge Planning Assessment  [facility]     Patient Name: Marcus Simon  MRN: [id number]  Today's Date: 3/22/2024    Admit Date: 3/21/2024    Plan: Home w/ wife; APRN said he does not require HH at this time.   Discharge Needs Assessment       Row Name 03/22/24 1655       Living Environment    People in Home spouse    Name(s) of People in Home Yadi Simon/spouse    Current Living Arrangements home    Potentially Unsafe Housing Conditions none    In the past 12 months has the electric, gas, oil, or water company threatened to shut off services in your home? No    Primary Care Provided by self    Provides Primary Care For pet(s)  5 DOGS    Family Caregiver if Needed spouse    Family Caregiver Names YADI    Quality of Family Relationships helpful;involved;supportive    Able to Return to Prior Arrangements yes       Resource/Environmental Concerns    Resource/Environmental Concerns none    Transportation Concerns none       Transportation Needs    In the past 12 months, has lack of transportation kept you from medical appointments or from getting medications? no    In the past 12 months, has lack of transportation kept you from meetings, work, or from getting things needed for daily living? No       Food Insecurity    Within the past 12 months, you worried that your food would run out before you got the money to buy more. Never true       Transition Planning    Patient/Family Anticipates Transition to home with family    Patient/Family Anticipated Services at Transition none    Transportation Anticipated family or friend will provide       Discharge Needs Assessment    Readmission Within the Last 30 Days no previous admission in last 30 days    Equipment Currently Used at Home cane, straight;pulse ox;bp cuff    Concerns to be Addressed no discharge needs identified;denies needs/concerns at this time    Anticipated Changes Related to Illness none    Equipment Needed After Discharge none    Provided Post Acute  Provider List? N/A                   Discharge Plan       Row Name 03/22/24 9714       Plan    Plan Home w/ wife; APRN said he does not require HH at this time.    Plan Comments CCP spoke with pleasant patient at bedside.  CCP role explained and discharge planning discussed.  Face sheet verified.  Patient stated he is IADL's, works full-time and drives.  Patient lives with spouse/Yadi Givens in a single-story home with three entrance stair steps from front [facility].  Patients PCP confirmed as, Jackson Vzáquez.  Patient's pharmacy confirmed as, [facility].  Pt has been enrolled in MEDS to BEDS with his permission.  Patient denies use of past home health or going to a sub-acute rehab.  Patient has the following DME- BP CUFF, straight cane and pulse oximeter.  Per the APRN, Pt will not require home health services at discharge.  Pt ambulated 200 feet with P.T. today.  Patient plans to return home at discharge.  Patient denies current discharge needs.  CCP will continue to follow…….Magalis PUCKETT /JEZ.                  Continued Care and Services - Admitted Since 3/21/2024    No active coordination exists for this encounter.          Demographic Summary       Row Name 03/22/24 9562       General Information    Admission Type inpatient    Arrived From home    Referral Source admission list    Reason for Consult discharge planning    Preferred Language English       Contact Information    Permission Granted to Share Info With family/designee                   Functional Status       Row Name 03/22/24 5558       Functional Status    Usual Activity Tolerance good    Current Activity Tolerance moderate       Assessment of Health Literacy    How often do you have someone help you read hospital materials? Never    Health Literacy Excellent       Functional Status, IADL    Medications independent    Meal Preparation independent    Housekeeping independent    Laundry independent    Shopping independent    IADL Comments Uses  straight can occasionally       Mental Status    General Appearance WDL WDL       Mental Status Summary    Recent Changes in Mental Status/Cognitive Functioning no changes       Employment/    Employment Status employed full-time                   Psychosocial    No documentation.                  Abuse/Neglect    No documentation.                  Legal    No documentation.                  Substance Abuse    No documentation.                  Patient Forms    No documentation.                     Magalis Ledesma RN

## 2024-03-22 NOTE — PLAN OF CARE
Goal Outcome Evaluation:  Plan of Care Reviewed With: patient        Progress: no change  Outcome Evaluation: Morphine for chest discomfort with good relief. Dressing changed x 2 to chest tube site. A-fib with RVR. Runs of V-tach noted at shift change, night RN has call out to Cardiology and CT surgery.
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
## 2024-03-22 NOTE — PROGRESS NOTES
Still with Afib and short runs of VT, HR 120s. QTc 534; hold Tikosyn until seen by cardiology.     Corrine Baldwin

## 2024-03-22 NOTE — DISCHARGE SUMMARY
Date of Admission: 3/21/2024  Date of Discharge: 3/24/2024    Discharge Diagnosis:   - persistent atrial fibrillation with hx of PVI/multiple cardioversions--on Tikosyn; LD Eliquis 3/15 s/p convergent procedure/ANA occlusion ([place])  - hypertension  - hyperlipidemia--statin therapy  - hypothyroidism  - mild non-obstructive CAD  - DM II  - DDD  - hx of TIA  - obesity class 3 with previous gastric bypass in 1984; Ozempic on hold  - leukocytosis--reactive/steroid administration    Presenting Problem/History of Present Illness  Atrial fibrillation, persistent [I48.19]  Atrial fibrillation [I48.91]     Hospital Course  Patient is a 59 y.o. male who presented from home the morning of surgery.  On 3/21/2024, he underwent minimally invasive epicardial ablation posterior wall of left atrium via subxiphoid approach, exploratory left VAT with epicardial occlusion of left atrial appendage with 45 mm AtriClip by Dr. Clifton.  Please see op note for full details.  On POD#1, he remained in afib with rates 120s, which is his baseline.  He had brief episode of NSVT and received IV digoxin/ metoprolol.  Prolonged QTc noted, Tikosyn held until seen by EPS.  Steroids ordered routinely for inflammation post procedure.  On POD#2, chest tubes removed.  Eliquis restarted and ready for DC home on POD#3.  Atenolol increased per cards to 37.5 q 8hrs.    Procedures Performed  Per Dr. Clifton 3/21/2024  MINIMALLY INVASIVE EPICARDIAL ABLATION POSTERIOR WALL OF LEFT ATRIUM VIA SUB-XYPHOID APPROACH,   EXPLORATORY LEFT VAT WITH EPICARDIAL OCCLUSION OF LEFT ATRIAL APPENDAGE WITH PLACEMENT OF 45mm ATRICLIP - General  Electrical Cardioversion         Consults:   Consults       Date and Time Order Name Status Description    3/21/2024  3:30 PM Cardiac Electrophysiologist Inpatient Consult              Pertinent Test Results:    Lab Results   Component Value Date    WBC 10.65 03/24/2024    HGB 14.7 03/24/2024    HCT 43.1 03/24/2024     .0 (H) 03/24/2024     03/24/2024      Lab Results   Component Value Date    GLUCOSE 159 (H) 03/24/2024    CALCIUM 8.9 03/24/2024     03/24/2024    K 4.5 03/24/2024    CO2 28.3 03/24/2024     03/24/2024    BUN 21 (H) 03/24/2024    CREATININE 0.64 (L) 03/24/2024    EGFRIFAFRI >60 11/25/2020    BCR 32.8 (H) 03/24/2024    ANIONGAP 8.7 03/24/2024     Lab Results   Component Value Date    INR 1.06 03/19/2024    PROTIME 14.0 03/19/2024         Condition on Discharge: Stable for discharge to home    Vital Signs  Temp:  [97.8 °F (36.6 °C)-98.1 °F (36.7 °C)] 98.1 °F (36.7 °C)  Heart Rate:  [114-122] 114  Resp:  [17-20] 20  BP: (115-142)/(75-95) 125/85      Discharge Disposition  Home or Self Care    Discharge Medications     Discharge Medications        New Medications        Instructions Start Date   atenolol 25 MG tablet  Commonly known as: TENORMIN   37.5 mg, Oral, Every 8 Hours      HYDROcodone-acetaminophen 5-325 MG per tablet  Commonly known as: NORCO   1 tablet, Oral, Every 6 Hours PRN      methylPREDNISolone 4 MG dose pack  Commonly known as: MEDROL   Take as directed on package instructions.             Changes to Medications        Instructions Start Date   Eliquis 5 MG tablet tablet  Generic drug: apixaban  What changed: additional instructions   5 mg, Oral, 2 Times Daily             Continue These Medications        Instructions Start Date   atorvastatin 10 MG tablet  Commonly known as: LIPITOR   10 mg, Oral, Nightly      baclofen 10 MG tablet  Commonly known as: LIORESAL   10 mg, Oral, 3 Times Daily PRN      benzonatate 200 MG capsule  Commonly known as: TESSALON   2 Times Daily PRN      bisacodyl 5 MG EC tablet  Commonly known as: DULCOLAX   5 mg, Oral, Daily PRN      colchicine 0.6 MG tablet   Daily PRN      dofetilide 500 MCG capsule  Commonly known as: TIKOSYN   TAKE ONE CAPSULE BY MOUTH EVERY 12 HOURS      ferrous gluconate 324 MG tablet  Commonly known as: FERGON   324 mg, Oral, 2  Times Daily      furosemide 20 MG tablet  Commonly known as: Lasix   20 mg, Oral, 2 Times Daily      gabapentin 300 MG capsule  Commonly known as: NEURONTIN   300 mg, Oral, 2 Times Daily      levothyroxine 150 MCG tablet  Commonly known as: SYNTHROID, LEVOTHROID   150 mcg, Oral, Daily      metFORMIN 500 MG tablet  Commonly known as: GLUCOPHAGE   500 mg, Oral, Daily With Breakfast      Vitamin D (Cholecalciferol) 10 MCG (400 UNIT) tablet  Commonly known as: CHOLECALCIFEROL   400 Units, Oral, Daily      vitamin E 100 UNIT capsule   100 Units, Oral, Daily, HOLD PER MD INSTR             Stop These Medications      amLODIPine-benazepril 10-20 MG per capsule  Commonly known as: LOTREL     BACTROBAN NASAL NA     LOVENOX SC     metoprolol succinate  MG 24 hr tablet  Commonly known as: TOPROL-XL     Peridex 0.12 % solution  Generic drug: chlorhexidine     Semaglutide(0.25 or 0.5MG/DOS) 2 MG/1.5ML solution pen-injector  Commonly known as: OZEMPIC              Discharge Diet:   Healthy Heart Diet    Activity at Discharge:   1. No driving for 2 weeks and off narcotic pain medications.  2. Shower daily. Clean incisions with warm water and antibacterial soap only. Do not put any lotion or ointments on incisions.  3. Ambulate for 10 minutes at least 3 times a day.  4. No heavy lifting > 10lbs until seen in office.   5. Take all medications as prescribed.     Follow-up Appointments  No future appointments.  Additional Instructions for the Follow-ups that You Need to Schedule       Discharge Follow-up with PCP   As directed       Currently Documented PCP:    Jackson Vázquez MD    PCP Phone Number:    [phone]     Follow Up Details: in one month        Discharge Follow-up with Specialty: Cardiology--EP service; 2 Weeks   As directed      Specialty: Cardiology--EP service   Follow Up: 2 Weeks   Follow Up Details: EP follow-up one week        Discharge Follow-up with Specified Provider: Cardiac Surgery; 1 Month   As  directed      To: Cardiac Surgery   Follow Up: 1 Month        Call MD With Problems / Concerns    Apr 24, 2024 (Approximate)      Instructions: Call for temp >101 or any surgical wound drainage    Order Comments: Instructions: Call for temp >101 or any surgical wound drainage                 Test Results Pending at Discharge       María Carrington, APRN  03/24/24  10:35 EDT
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
## 2024-03-22 NOTE — PLAN OF CARE
Goal Outcome Evaluation:  Plan of Care Reviewed With: patient           Outcome Evaluation: Pt. is a 59 year old Male who is s/p Open Heart Convergent Hybrid ablation.  Pt. reports that prior to admission he was independent with functional mobility and used no A.D. during ambulation.  Pt. currently presents with decreased strength, decreased balance, and decreased tolerance to functional activity.  This PM, pt. able to ambulate 200 feet, CGA x 1, with no use of A.D.  Pt. requires CGA x 1 for bed mobility and CGA x 1 for sit <-> stand transfers.  Pt. will benefit from skilled inpt. P.T. to address his functional deficits and to assist pt. in regaining his maximum level of independence with functional mobility.      Anticipated Discharge Disposition (PT): home with assist, home with home health

## 2024-03-23 ENCOUNTER — APPOINTMENT (OUTPATIENT)
Dept: GENERAL RADIOLOGY | Facility: HOSPITAL | Age: 59
DRG: 229 | End: 2024-03-23
Payer: COMMERCIAL

## 2024-03-23 LAB
ANION GAP SERPL CALCULATED.3IONS-SCNC: 6.2 MMOL/L (ref 5–15)
BUN SERPL-MCNC: 24 MG/DL (ref 6–20)
BUN/CREAT SERPL: 33.8 (ref 7–25)
CALCIUM SPEC-SCNC: 8.4 MG/DL (ref 8.6–10.5)
CHLORIDE SERPL-SCNC: 103 MMOL/L (ref 98–107)
CO2 SERPL-SCNC: 25.8 MMOL/L (ref 22–29)
CREAT SERPL-MCNC: 0.71 MG/DL (ref 0.76–1.27)
DEPRECATED RDW RBC AUTO: 44.2 FL (ref 37–54)
EGFRCR SERPLBLD CKD-EPI 2021: 106.3 ML/MIN/1.73
ERYTHROCYTE [DISTWIDTH] IN BLOOD BY AUTOMATED COUNT: 11.8 % (ref 12.3–15.4)
GLUCOSE BLDC GLUCOMTR-MCNC: 116 MG/DL (ref 70–130)
GLUCOSE BLDC GLUCOMTR-MCNC: 128 MG/DL (ref 70–130)
GLUCOSE BLDC GLUCOMTR-MCNC: 134 MG/DL (ref 70–130)
GLUCOSE BLDC GLUCOMTR-MCNC: 161 MG/DL (ref 70–130)
GLUCOSE SERPL-MCNC: 153 MG/DL (ref 65–99)
HCT VFR BLD AUTO: 43.8 % (ref 37.5–51)
HGB BLD-MCNC: 14.6 G/DL (ref 13–17.7)
MCH RBC QN AUTO: 33.8 PG (ref 26.6–33)
MCHC RBC AUTO-ENTMCNC: 33.3 G/DL (ref 31.5–35.7)
MCV RBC AUTO: 101.4 FL (ref 79–97)
PLATELET # BLD AUTO: 163 10*3/MM3 (ref 140–450)
PMV BLD AUTO: 10.4 FL (ref 6–12)
POTASSIUM SERPL-SCNC: 4.6 MMOL/L (ref 3.5–5.2)
RBC # BLD AUTO: 4.32 10*6/MM3 (ref 4.14–5.8)
SODIUM SERPL-SCNC: 135 MMOL/L (ref 136–145)
WBC NRBC COR # BLD AUTO: 13.57 10*3/MM3 (ref 3.4–10.8)

## 2024-03-23 PROCEDURE — 71045 X-RAY EXAM CHEST 1 VIEW: CPT

## 2024-03-23 PROCEDURE — 63710000001 METHYLPREDNISOLONE 4 MG TABLET THERAPY PACK 21 EACH DISP PACK: Performed by: NURSE PRACTITIONER

## 2024-03-23 PROCEDURE — 63710000001 INSULIN LISPRO (HUMAN) PER 5 UNITS: Performed by: NURSE PRACTITIONER

## 2024-03-23 PROCEDURE — 25010000002 KETOROLAC TROMETHAMINE PER 15 MG: Performed by: NURSE PRACTITIONER

## 2024-03-23 PROCEDURE — 99232 SBSQ HOSP IP/OBS MODERATE 35: CPT | Performed by: NURSE PRACTITIONER

## 2024-03-23 PROCEDURE — 93005 ELECTROCARDIOGRAM TRACING: CPT | Performed by: NURSE PRACTITIONER

## 2024-03-23 PROCEDURE — 82948 REAGENT STRIP/BLOOD GLUCOSE: CPT

## 2024-03-23 PROCEDURE — 93005 ELECTROCARDIOGRAM TRACING: CPT

## 2024-03-23 PROCEDURE — 93010 ELECTROCARDIOGRAM REPORT: CPT | Performed by: INTERNAL MEDICINE

## 2024-03-23 PROCEDURE — 80048 BASIC METABOLIC PNL TOTAL CA: CPT | Performed by: NURSE PRACTITIONER

## 2024-03-23 PROCEDURE — 25010000002 ENOXAPARIN PER 10 MG: Performed by: NURSE PRACTITIONER

## 2024-03-23 PROCEDURE — 25010000002 CEFAZOLIN 3 G RECONSTITUTED SOLUTION 1 EACH VIAL: Performed by: NURSE PRACTITIONER

## 2024-03-23 PROCEDURE — 85027 COMPLETE CBC AUTOMATED: CPT | Performed by: NURSE PRACTITIONER

## 2024-03-23 RX ORDER — ATENOLOL 25 MG/1
25 TABLET ORAL EVERY 8 HOURS
Status: DISCONTINUED | OUTPATIENT
Start: 2024-03-23 | End: 2024-03-24

## 2024-03-23 RX ADMIN — ATENOLOL 25 MG: 25 TABLET ORAL at 10:12

## 2024-03-23 RX ADMIN — METHYLPREDNISOLONE 4 MG: 4 TABLET ORAL at 13:10

## 2024-03-23 RX ADMIN — FERROUS SULFATE TAB 325 MG (65 MG ELEMENTAL FE) 325 MG: 325 (65 FE) TAB at 08:55

## 2024-03-23 RX ADMIN — HYDROCODONE BITARTRATE AND ACETAMINOPHEN 2 TABLET: 5; 325 TABLET ORAL at 20:46

## 2024-03-23 RX ADMIN — METHYLPREDNISOLONE 4 MG: 4 TABLET ORAL at 17:08

## 2024-03-23 RX ADMIN — GABAPENTIN 300 MG: 300 CAPSULE ORAL at 08:55

## 2024-03-23 RX ADMIN — SODIUM CHLORIDE 3 G: 900 INJECTION INTRAVENOUS at 05:08

## 2024-03-23 RX ADMIN — DOFETILIDE 500 MCG: 0.5 CAPSULE ORAL at 17:07

## 2024-03-23 RX ADMIN — KETOROLAC TROMETHAMINE 15 MG: 15 INJECTION, SOLUTION INTRAMUSCULAR; INTRAVENOUS at 10:19

## 2024-03-23 RX ADMIN — ATENOLOL 25 MG: 25 TABLET ORAL at 17:07

## 2024-03-23 RX ADMIN — DOFETILIDE 500 MCG: 0.5 CAPSULE ORAL at 05:22

## 2024-03-23 RX ADMIN — METHYLPREDNISOLONE 4 MG: 4 TABLET ORAL at 07:08

## 2024-03-23 RX ADMIN — INSULIN LISPRO 3 UNITS: 100 INJECTION, SOLUTION INTRAVENOUS; SUBCUTANEOUS at 20:46

## 2024-03-23 RX ADMIN — FERROUS SULFATE TAB 325 MG (65 MG ELEMENTAL FE) 325 MG: 325 (65 FE) TAB at 17:07

## 2024-03-23 RX ADMIN — DOCUSATE SODIUM 50MG AND SENNOSIDES 8.6MG 2 TABLET: 8.6; 5 TABLET, FILM COATED ORAL at 20:46

## 2024-03-23 RX ADMIN — APIXABAN 5 MG: 5 TABLET, FILM COATED ORAL at 22:26

## 2024-03-23 RX ADMIN — METHYLPREDNISOLONE 8 MG: 4 TABLET ORAL at 20:46

## 2024-03-23 RX ADMIN — PANTOPRAZOLE SODIUM 40 MG: 40 TABLET, DELAYED RELEASE ORAL at 06:46

## 2024-03-23 RX ADMIN — LEVOTHYROXINE SODIUM 150 MCG: 150 TABLET ORAL at 06:46

## 2024-03-23 RX ADMIN — ATORVASTATIN CALCIUM 10 MG: 20 TABLET, FILM COATED ORAL at 20:46

## 2024-03-23 RX ADMIN — GABAPENTIN 300 MG: 300 CAPSULE ORAL at 20:46

## 2024-03-23 RX ADMIN — ENOXAPARIN SODIUM 40 MG: 100 INJECTION SUBCUTANEOUS at 17:07

## 2024-03-23 RX ADMIN — HYDROCODONE BITARTRATE AND ACETAMINOPHEN 2 TABLET: 5; 325 TABLET ORAL at 06:46

## 2024-03-23 NOTE — PROGRESS NOTES
" LOS: 2 days   Patient Care Team:  Jackson Vázquez MD as PCP - General  Jackson Vázquez MD as PCP - Family Medicine  Ron Cowan MD as Consulting Physician (Cardiology)    Chief Complaint: post op    Subjective:  Symptoms:  No shortness of breath or chest pain.    Diet:  Adequate intake.  No nausea or vomiting.    Activity level: Normal with assistance.    Pain:  He complains of pain that is mild.  Pain is well controlled.        Vital Signs  Temp:  [97.6 °F (36.4 °C)-98.3 °F (36.8 °C)] 98.2 °F (36.8 °C)  Heart Rate:  [109-145] 145  Resp:  [16-20] 18  BP: (106-141)/(75-97) 106/76  Body mass index is 42.45 kg/m².    Intake/Output Summary (Last 24 hours) at 3/23/2024 0822  Last data filed at 3/23/2024 0811  Gross per 24 hour   Intake 1040 ml   Output 2840 ml   Net -1800 ml     I/O this shift:  In: 320 [P.O.:320]  Out: -     Chest tube drainage last 8 hours: 250        03/21/24  0527 03/22/24  0654 03/23/24  0659   Weight: (!) 159 kg (350 lb 6.4 oz) (!) 161 kg (354 lb 2.7 oz) (!) 158 kg (348 lb 12.3 oz)     Objective:  General Appearance:  Comfortable and in no acute distress.    Vital signs: (most recent): Blood pressure 106/76, pulse (!) 145, temperature 98.2 °F (36.8 °C), temperature source Oral, resp. rate 18, height 193 cm (76\"), weight (!) 158 kg (348 lb 12.3 oz), SpO2 95%.  Vital signs are normal.  No fever.    Output: Producing urine and no stool output (+flatus).    Lungs:  Normal effort and normal respiratory rate.  There are decreased breath sounds.    Heart: Tachycardia.  Irregular rhythm.  (Afib 120-130s)  Abdomen: Abdomen is soft.  Bowel sounds are normal.     Extremities: There is dependent edema.    Pulses: Distal pulses are intact.    Neurological: Patient is alert and oriented to person, place and time.    Skin:  Warm and dry.  (Left chest incisions clean and dry with skin glue)          Results Review:        WBC WBC   Date Value Ref Range Status   03/23/2024 13.57 (H) 3.40 - 10.80 " "10*3/mm3 Final   03/22/2024 15.04 (H) 3.40 - 10.80 10*3/mm3 Final   03/21/2024 9.70 3.40 - 10.80 10*3/mm3 Final      HGB Hemoglobin   Date Value Ref Range Status   03/23/2024 14.6 13.0 - 17.7 g/dL Final   03/22/2024 15.3 13.0 - 17.7 g/dL Final   03/21/2024 15.3 13.0 - 17.7 g/dL Final   03/21/2024 14.6 12.0 - 17.0 g/dL Final   03/21/2024 13.6 12.0 - 17.0 g/dL Final   03/21/2024 13.3 12.0 - 17.0 g/dL Final      HCT Hematocrit   Date Value Ref Range Status   03/23/2024 43.8 37.5 - 51.0 % Final   03/22/2024 45.0 37.5 - 51.0 % Final   03/21/2024 44.0 37.5 - 51.0 % Final   03/21/2024 43 38 - 51 % Final   03/21/2024 40 38 - 51 % Final   03/21/2024 39 38 - 51 % Final      Platelets Platelets   Date Value Ref Range Status   03/23/2024 163 140 - 450 10*3/mm3 Final   03/22/2024 164 140 - 450 10*3/mm3 Final   03/21/2024 168 140 - 450 10*3/mm3 Final        PT/INR:    No results found for: \"PROTIME\"  /  No results found for: \"INR\"      Sodium Sodium   Date Value Ref Range Status   03/23/2024 135 (L) 136 - 145 mmol/L Final   03/22/2024 135 (L) 136 - 145 mmol/L Final   03/22/2024 140 136 - 145 mmol/L Final   03/21/2024 140 136 - 145 mmol/L Final      Potassium Potassium   Date Value Ref Range Status   03/23/2024 4.6 3.5 - 5.2 mmol/L Final   03/22/2024 4.4 3.5 - 5.2 mmol/L Final   03/22/2024 4.7 3.5 - 5.2 mmol/L Final   03/21/2024 4.7 3.5 - 5.2 mmol/L Final   03/21/2024 4.2 3.5 - 5.2 mmol/L Final      Chloride Chloride   Date Value Ref Range Status   03/23/2024 103 98 - 107 mmol/L Final   03/22/2024 102 98 - 107 mmol/L Final   03/22/2024 104 98 - 107 mmol/L Final   03/21/2024 106 98 - 107 mmol/L Final      Bicarbonate CO2   Date Value Ref Range Status   03/23/2024 25.8 22.0 - 29.0 mmol/L Final   03/22/2024 20.4 (L) 22.0 - 29.0 mmol/L Final   03/22/2024 24.6 22.0 - 29.0 mmol/L Final   03/21/2024 21.0 (L) 22.0 - 29.0 mmol/L Final      BUN BUN   Date Value Ref Range Status   03/23/2024 24 (H) 6 - 20 mg/dL Final   03/22/2024 18 6 - 20 " mg/dL Final   03/22/2024 17 6 - 20 mg/dL Final   03/21/2024 17 6 - 20 mg/dL Final      Creatinine Creatinine   Date Value Ref Range Status   03/23/2024 0.71 (L) 0.76 - 1.27 mg/dL Final   03/22/2024 0.69 (L) 0.76 - 1.27 mg/dL Final   03/22/2024 0.83 0.76 - 1.27 mg/dL Final   03/21/2024 0.71 (L) 0.76 - 1.27 mg/dL Final      Calcium Calcium   Date Value Ref Range Status   03/23/2024 8.4 (L) 8.6 - 10.5 mg/dL Final   03/22/2024 8.7 8.6 - 10.5 mg/dL Final   03/22/2024 8.8 8.6 - 10.5 mg/dL Final   03/21/2024 8.8 8.6 - 10.5 mg/dL Final      Magnesium Magnesium   Date Value Ref Range Status   03/22/2024 2.3 1.6 - 2.6 mg/dL Final   03/21/2024 1.8 1.6 - 2.6 mg/dL Final          atenolol, 25 mg, Oral, Q8H  atorvastatin, 10 mg, Oral, Nightly  dofetilide, 500 mcg, Oral, Q12H  enoxaparin, 40 mg, Subcutaneous, Q24H  ferrous sulfate, 325 mg, Oral, BID With Meals  gabapentin, 300 mg, Oral, BID  insulin lispro, 3-14 Units, Subcutaneous, 4x Daily AC & at Bedtime  levothyroxine, 150 mcg, Oral, Q AM  methylPREDNISolone, 4 mg, Oral, TID Around Food  [START ON 3/24/2024] methylPREDNISolone, 4 mg, Oral, 4x Daily Taper  [START ON 3/25/2024] methylPREDNISolone, 4 mg, Oral, TID Around Food  [START ON 3/26/2024] methylPREDNISolone, 4 mg, Oral, Before Breakfast  [START ON 3/26/2024] methylPREDNISolone, 4 mg, Oral, Tonight  [START ON 3/27/2024] methylPREDNISolone, 4 mg, Oral, Before Breakfast  methylPREDNISolone, 8 mg, Oral, Tonight  pantoprazole, 40 mg, Oral, QAM  senna-docusate sodium, 2 tablet, Oral, Nightly         Assessment & Plan  - persistent atrial fibrillation with hx of PVI/multiple cardioversions--on Tikosyn; LD Eliquis 3/15 s/p convergent procedure/ANA occlusion POD#2 [place]  - hypertension  - hyperlipidemia--statin therapy  - hypothyroidism  - mild non-obstructive CAD  - DM II  - DDD  - hx of TIA  - obesity class 3 with previous gastric bypass in 1984; Ozempic on hold  - leukocytosis--reactive/steroid administration    Looks  good this morning, up in the chair. Pain controlled on current regimen  Remains in atrial fibrillation with rates in the 120s, which is his baseline.   Back on Tikosyn by EP  Transition steroids to oral dosing  DC chest tube  Plans to restart Eliquis tomorrow  Wt down 1 kg from preop  Mobilize/encourage pulmonary toilet--cont flutter valve  Continue routine care    María Carrington, JOESPH  03/23/24  08:22 EDT

## 2024-03-23 NOTE — PROGRESS NOTES
"[facility]    Patient Name: Marcus Simon  :1965  59 y.o.  LOS: 2  Encounter Provider: JOESPH Dahl      Patient Care Team:  Jackson Vázquez MD as PCP - General  Jackson Vázquez MD as PCP - Family Medicine  Ron Cowan MD as Consulting Physician (Cardiology)    Chief Complaint:  Postop surgical ablation, ANA    Interval History: HR has responded to the change to atenolol, in the 110s-120s rather than the 140s.  Remains with chest tube in place.        Objective   Vital Signs  Temp:  [97.6 °F (36.4 °C)-98.3 °F (36.8 °C)] 97.8 °F (36.6 °C)  Heart Rate:  [109-145] 145  Resp:  [16-20] 20  BP: (111-141)/(75-97) 125/81    Intake/Output Summary (Last 24 hours) at 3/23/2024 0809  Last data filed at 3/23/2024 0645  Gross per 24 hour   Intake 840 ml   Output 2840 ml   Net -2000 ml     Flowsheet Rows      Flowsheet Row First Filed Value   Admission Height 193 cm (76\") Documented at 2024 0527   Admission Weight 159 kg (350 lb 6.4 oz) Documented at 2024 0527              Constitutional:       Appearance: Normal appearance. Well-developed.   Eyes:      Conjunctiva/sclera: Conjunctivae normal.   Neck:      Vascular: No carotid bruit.   Pulmonary:      Effort: Pulmonary effort is normal.      Breath sounds: Normal breath sounds.      Comments: Chest tube remains, left side  Cardiovascular:      Tachycardia present. Irregularly irregular rhythm. Normal S1. Normal S2.       Murmurs: There is no murmur.      No gallop.  No click. No rub.   Edema:     Peripheral edema absent.   Musculoskeletal: Normal range of motion. Skin:     General: Skin is warm and dry.   Neurological:      Mental Status: Alert and oriented to person, place, and time.      GCS: GCS eye subscore is 4. GCS verbal subscore is 5. GCS motor subscore is 6.   Psychiatric:         Speech: Speech normal.         Behavior: Behavior normal.         Thought Content: Thought content normal.         Judgment: " "Judgment normal.           Pertinent Test Results:  Results from last 7 days   Lab Units 03/23/24  0235 03/22/24  0917 03/22/24  0103 03/21/24  1950 03/21/24  1354 03/19/24  0915   SODIUM mmol/L 135* 135* 140  --  140 141   POTASSIUM mmol/L 4.6 4.4 4.7 4.7 4.2 3.9   CHLORIDE mmol/L 103 102 104  --  106 102   CO2 mmol/L 25.8 20.4* 24.6  --  21.0* 30.0*   BUN mg/dL 24* 18 17  --  17 14   CREATININE mg/dL 0.71* 0.69* 0.83  --  0.71* 0.88   GLUCOSE mg/dL 153* 239* 207*  --  199* 150*   CALCIUM mg/dL 8.4* 8.7 8.8  --  8.8 9.3   AST (SGOT) U/L  --   --   --   --   --  16   ALT (SGPT) U/L  --   --   --   --   --  20         Results from last 7 days   Lab Units 03/23/24  0235 03/22/24  0103 03/21/24  1354 03/21/24  1213 03/21/24  0939 03/21/24  0814 03/19/24  0915   WBC 10*3/mm3 13.57* 15.04* 9.70  --   --   --  4.85   HEMOGLOBIN g/dL 14.6 15.3 15.3  --   --   --  15.5   HEMOGLOBIN, POC g/dL  --   --   --  14.6 13.6 13.3  --    HEMATOCRIT % 43.8 45.0 44.0  --   --   --  45.4   HEMATOCRIT POC %  --   --   --  43 40 39  --    PLATELETS 10*3/mm3 163 164 168  --   --   --  179     Results from last 7 days   Lab Units 03/19/24  0915   INR  1.06   APTT seconds 33.3     Results from last 7 days   Lab Units 03/22/24  0103 03/21/24  1950 03/19/24  0915   MAGNESIUM mg/dL 2.3 1.8 1.6           Invalid input(s): \"LDLCALC\"                Medication Review:   atorvastatin, 10 mg, Oral, Nightly  dofetilide, 500 mcg, Oral, Q12H  enoxaparin, 40 mg, Subcutaneous, Q24H  ferrous sulfate, 325 mg, Oral, BID With Meals  gabapentin, 300 mg, Oral, BID  insulin lispro, 3-14 Units, Subcutaneous, 4x Daily AC & at Bedtime  levothyroxine, 150 mcg, Oral, Q AM  methylPREDNISolone, 4 mg, Oral, TID Around Food  [START ON 3/24/2024] methylPREDNISolone, 4 mg, Oral, 4x Daily Taper  [START ON 3/25/2024] methylPREDNISolone, 4 mg, Oral, TID Around Food  [START ON 3/26/2024] methylPREDNISolone, 4 mg, Oral, Before Breakfast  [START ON 3/26/2024] methylPREDNISolone, " 4 mg, Oral, Tonight  [START ON 3/27/2024] methylPREDNISolone, 4 mg, Oral, Before Breakfast  methylPREDNISolone, 8 mg, Oral, Tonight  metoprolol succinate XL, 100 mg, Oral, Daily  pantoprazole, 40 mg, Oral, QAM  senna-docusate sodium, 2 tablet, Oral, Nightly              Assessment & Plan     Active Hospital Problems    Diagnosis  POA    **Atrial fibrillation, persistent [I48.19]  Unknown    Atrial fibrillation [I48.91]  Yes      Resolved Hospital Problems   No resolved problems to display.          Persistent atrial fibrillation  With history of multiple cardioversions  Continue Tikosyn, Qtc 407  Resume apixaban when okay with CT surgery, currently on enoxaparin  Heart rate in the 140s  Has had a full digoxin load  I have spoken with JOESPH De La Garza for EP.  Recommend transitioning from metoprolol succinate to atenolol 25 mg every 8 hours.  At time of patient assessment, patient has received 1st dose of atenolol and HR in the 110s-120s  Status post convergent procedure/ANA occlusion POD #2  Unfortunately remains in persistent atrial fibrillation with rapid heart rates  Remains with left sided chest tube.   HTN  BP controlled  Continue current regimen  Hyperlipidemia  Hypothyroidism  Mild nonobstructive CAD           JOESPH Dahl  [facility]   [facility]  [address] [place] [address] - [address]  [place] [address]  Office: [phone]    03/23/24  08:09 EDT

## 2024-03-23 NOTE — PLAN OF CARE
Goal Outcome Evaluation:  Plan of Care Reviewed With: patient        Progress: improving  Outcome Evaluation: VSS; A&Ox4. Pt continues with CT with 90ml out. Pt voiding well after f/c removed. Pt is on room air. Pt received Tikosyn per order. Pt remains Afib on the monitor. Pt w c/o pain controlled by prn pain med. Pt expresses no additional needs at this time. Plan of care is ongoing.

## 2024-03-23 NOTE — PLAN OF CARE
Goal Outcome Evaluation:   Pt is alert and oriented and follows commands. Remains a fib 100's to 120's on monitor. CT dc per order today. Vitals stable plan of care in progress

## 2024-03-24 ENCOUNTER — APPOINTMENT (OUTPATIENT)
Dept: GENERAL RADIOLOGY | Facility: HOSPITAL | Age: 59
DRG: 229 | End: 2024-03-24
Payer: COMMERCIAL

## 2024-03-24 ENCOUNTER — READMISSION MANAGEMENT (OUTPATIENT)
Dept: CALL CENTER | Facility: HOSPITAL | Age: 59
End: 2024-03-24
Payer: COMMERCIAL

## 2024-03-24 VITALS
RESPIRATION RATE: 18 BRPM | SYSTOLIC BLOOD PRESSURE: 131 MMHG | OXYGEN SATURATION: 97 % | DIASTOLIC BLOOD PRESSURE: 91 MMHG | TEMPERATURE: 97.7 F | HEIGHT: 76 IN | HEART RATE: 110 BPM | WEIGHT: 315 LBS | BODY MASS INDEX: 38.36 KG/M2

## 2024-03-24 LAB
ANION GAP SERPL CALCULATED.3IONS-SCNC: 8.7 MMOL/L (ref 5–15)
BUN SERPL-MCNC: 21 MG/DL (ref 6–20)
BUN/CREAT SERPL: 32.8 (ref 7–25)
CALCIUM SPEC-SCNC: 8.9 MG/DL (ref 8.6–10.5)
CHLORIDE SERPL-SCNC: 103 MMOL/L (ref 98–107)
CO2 SERPL-SCNC: 28.3 MMOL/L (ref 22–29)
CREAT SERPL-MCNC: 0.64 MG/DL (ref 0.76–1.27)
DEPRECATED RDW RBC AUTO: 43.7 FL (ref 37–54)
EGFRCR SERPLBLD CKD-EPI 2021: 109.7 ML/MIN/1.73
ERYTHROCYTE [DISTWIDTH] IN BLOOD BY AUTOMATED COUNT: 11.8 % (ref 12.3–15.4)
GLUCOSE BLDC GLUCOMTR-MCNC: 115 MG/DL (ref 70–130)
GLUCOSE BLDC GLUCOMTR-MCNC: 121 MG/DL (ref 70–130)
GLUCOSE SERPL-MCNC: 159 MG/DL (ref 65–99)
HCT VFR BLD AUTO: 43.1 % (ref 37.5–51)
HGB BLD-MCNC: 14.7 G/DL (ref 13–17.7)
MCH RBC QN AUTO: 34.1 PG (ref 26.6–33)
MCHC RBC AUTO-ENTMCNC: 34.1 G/DL (ref 31.5–35.7)
MCV RBC AUTO: 100 FL (ref 79–97)
PLATELET # BLD AUTO: 183 10*3/MM3 (ref 140–450)
PMV BLD AUTO: 10.2 FL (ref 6–12)
POTASSIUM SERPL-SCNC: 4.5 MMOL/L (ref 3.5–5.2)
RBC # BLD AUTO: 4.31 10*6/MM3 (ref 4.14–5.8)
SODIUM SERPL-SCNC: 140 MMOL/L (ref 136–145)
WBC NRBC COR # BLD AUTO: 10.65 10*3/MM3 (ref 3.4–10.8)

## 2024-03-24 PROCEDURE — 85027 COMPLETE CBC AUTOMATED: CPT | Performed by: NURSE PRACTITIONER

## 2024-03-24 PROCEDURE — 63710000001 METHYLPREDNISOLONE 4 MG TABLET THERAPY PACK 21 EACH DISP PACK: Performed by: NURSE PRACTITIONER

## 2024-03-24 PROCEDURE — 71045 X-RAY EXAM CHEST 1 VIEW: CPT

## 2024-03-24 PROCEDURE — 99232 SBSQ HOSP IP/OBS MODERATE 35: CPT | Performed by: NURSE PRACTITIONER

## 2024-03-24 PROCEDURE — 82948 REAGENT STRIP/BLOOD GLUCOSE: CPT

## 2024-03-24 PROCEDURE — 80048 BASIC METABOLIC PNL TOTAL CA: CPT | Performed by: NURSE PRACTITIONER

## 2024-03-24 PROCEDURE — 93010 ELECTROCARDIOGRAM REPORT: CPT | Performed by: INTERNAL MEDICINE

## 2024-03-24 PROCEDURE — 93005 ELECTROCARDIOGRAM TRACING: CPT | Performed by: NURSE PRACTITIONER

## 2024-03-24 RX ORDER — ATENOLOL 25 MG/1
37.5 TABLET ORAL EVERY 8 HOURS
Qty: 90 TABLET | Refills: 1 | Status: SHIPPED | OUTPATIENT
Start: 2024-03-24 | End: 2024-04-10 | Stop reason: HOSPADM

## 2024-03-24 RX ORDER — ATENOLOL 25 MG/1
37.5 TABLET ORAL EVERY 8 HOURS
Status: DISCONTINUED | OUTPATIENT
Start: 2024-03-24 | End: 2024-03-24 | Stop reason: HOSPADM

## 2024-03-24 RX ORDER — METHYLPREDNISOLONE 4 MG
TABLET, DOSE PACK ORAL
Qty: 21 TABLET | Refills: 0 | Status: SHIPPED | OUTPATIENT
Start: 2024-03-24 | End: 2024-04-04

## 2024-03-24 RX ORDER — HYDROCODONE BITARTRATE AND ACETAMINOPHEN 5; 325 MG/1; MG/1
1 TABLET ORAL EVERY 6 HOURS PRN
Qty: 25 TABLET | Refills: 0 | Status: ON HOLD | OUTPATIENT
Start: 2024-03-24 | End: 2024-04-22

## 2024-03-24 RX ADMIN — METHYLPREDNISOLONE 4 MG: 4 TABLET ORAL at 12:18

## 2024-03-24 RX ADMIN — ATENOLOL 25 MG: 25 TABLET ORAL at 01:01

## 2024-03-24 RX ADMIN — LEVOTHYROXINE SODIUM 150 MCG: 150 TABLET ORAL at 06:03

## 2024-03-24 RX ADMIN — ATENOLOL 37.5 MG: 25 TABLET ORAL at 08:42

## 2024-03-24 RX ADMIN — PANTOPRAZOLE SODIUM 40 MG: 40 TABLET, DELAYED RELEASE ORAL at 06:03

## 2024-03-24 RX ADMIN — APIXABAN 5 MG: 5 TABLET, FILM COATED ORAL at 08:42

## 2024-03-24 RX ADMIN — DOFETILIDE 500 MCG: 0.5 CAPSULE ORAL at 06:03

## 2024-03-24 RX ADMIN — GABAPENTIN 300 MG: 300 CAPSULE ORAL at 08:42

## 2024-03-24 RX ADMIN — METHYLPREDNISOLONE 4 MG: 4 TABLET ORAL at 06:03

## 2024-03-24 RX ADMIN — FERROUS SULFATE TAB 325 MG (65 MG ELEMENTAL FE) 325 MG: 325 (65 FE) TAB at 08:42

## 2024-03-24 NOTE — PROGRESS NOTES
"[facility]    Patient Name: Marcus Simon  :1965  59 y.o.  LOS: 3  Encounter Provider: JOESPH Dahl      Patient Care Team:  Jackson Vázquez MD as PCP - General  Jackson Vázquez MD as PCP - Family Medicine  Ron Cowan MD as Consulting Physician (Cardiology)    Chief Complaint:  Postop surgical ablation, ANA    Interval History: Heart rate more consistently in the 120s with transition to atenolol, patient's blood pressure has been stable.  Patient anxious for discharge       Objective   Vital Signs  Temp:  [97.8 °F (36.6 °C)-98.2 °F (36.8 °C)] 98.1 °F (36.7 °C)  Heart Rate:  [114-122] 114  Resp:  [17-20] 20  BP: (106-142)/(75-95) 125/85    Intake/Output Summary (Last 24 hours) at 3/24/2024 0801  Last data filed at 3/24/2024 0754  Gross per 24 hour   Intake 940 ml   Output 1950 ml   Net -1010 ml     Flowsheet Rows      Flowsheet Row First Filed Value   Admission Height 193 cm (76\") Documented at 2024 0527   Admission Weight 159 kg (350 lb 6.4 oz) Documented at 2024 0527              Constitutional:       Appearance: Normal appearance. Well-developed.   Eyes:      Conjunctiva/sclera: Conjunctivae normal.   Neck:      Vascular: No carotid bruit.   Pulmonary:      Effort: Pulmonary effort is normal.      Breath sounds: Normal breath sounds.      Comments: Chest tube remains, left side  Cardiovascular:      Tachycardia present. Irregularly irregular rhythm. Normal S1. Normal S2.       Murmurs: There is no murmur.      No gallop.  No click. No rub.   Edema:     Peripheral edema absent.   Musculoskeletal: Normal range of motion. Skin:     General: Skin is warm and dry.   Neurological:      Mental Status: Alert and oriented to person, place, and time.      GCS: GCS eye subscore is 4. GCS verbal subscore is 5. GCS motor subscore is 6.   Psychiatric:         Speech: Speech normal.         Behavior: Behavior normal.         Thought Content: Thought content " "normal.         Judgment: Judgment normal.           Pertinent Test Results:  Results from last 7 days   Lab Units 03/24/24  0254 03/23/24  0235 03/22/24  0917 03/22/24  0103 03/21/24  1950 03/21/24  1354 03/19/24  0915   SODIUM mmol/L 140 135* 135* 140  --  140 141   POTASSIUM mmol/L 4.5 4.6 4.4 4.7 4.7 4.2 3.9   CHLORIDE mmol/L 103 103 102 104  --  106 102   CO2 mmol/L 28.3 25.8 20.4* 24.6  --  21.0* 30.0*   BUN mg/dL 21* 24* 18 17  --  17 14   CREATININE mg/dL 0.64* 0.71* 0.69* 0.83  --  0.71* 0.88   GLUCOSE mg/dL 159* 153* 239* 207*  --  199* 150*   CALCIUM mg/dL 8.9 8.4* 8.7 8.8  --  8.8 9.3   AST (SGOT) U/L  --   --   --   --   --   --  16   ALT (SGPT) U/L  --   --   --   --   --   --  20         Results from last 7 days   Lab Units 03/24/24  0254 03/23/24  0235 03/22/24  0103 03/21/24  1354 03/21/24  1213 03/21/24  0939 03/21/24  0814 03/19/24  0915   WBC 10*3/mm3 10.65 13.57* 15.04* 9.70  --   --   --  4.85   HEMOGLOBIN g/dL 14.7 14.6 15.3 15.3  --   --   --  15.5   HEMOGLOBIN, POC g/dL  --   --   --   --  14.6 13.6 13.3  --    HEMATOCRIT % 43.1 43.8 45.0 44.0  --   --   --  45.4   HEMATOCRIT POC %  --   --   --   --  43 40 39  --    PLATELETS 10*3/mm3 183 163 164 168  --   --   --  179     Results from last 7 days   Lab Units 03/19/24  0915   INR  1.06   APTT seconds 33.3     Results from last 7 days   Lab Units 03/22/24  0103 03/21/24  1950 03/19/24  0915   MAGNESIUM mg/dL 2.3 1.8 1.6           Invalid input(s): \"LDLCALC\"                Medication Review:   apixaban, 5 mg, Oral, Q12H  atenolol, 25 mg, Oral, Q8H  atorvastatin, 10 mg, Oral, Nightly  dofetilide, 500 mcg, Oral, Q12H  ferrous sulfate, 325 mg, Oral, BID With Meals  gabapentin, 300 mg, Oral, BID  insulin lispro, 3-14 Units, Subcutaneous, 4x Daily AC & at Bedtime  levothyroxine, 150 mcg, Oral, Q AM  methylPREDNISolone, 4 mg, Oral, 4x Daily Taper  [START ON 3/25/2024] methylPREDNISolone, 4 mg, Oral, TID Around Food  [START ON 3/26/2024] " methylPREDNISolone, 4 mg, Oral, Before Breakfast  [START ON 3/26/2024] methylPREDNISolone, 4 mg, Oral, Tonight  [START ON 3/27/2024] methylPREDNISolone, 4 mg, Oral, Before Breakfast  pantoprazole, 40 mg, Oral, QAM  senna-docusate sodium, 2 tablet, Oral, Nightly              Assessment & Plan     Active Hospital Problems    Diagnosis  POA    **Atrial fibrillation, persistent [I48.19]  Unknown    Atrial fibrillation [I48.91]  Yes      Resolved Hospital Problems   No resolved problems to display.          Persistent atrial fibrillation  With history of multiple cardioversions  Continue Tikosyn, Qtc 407  Resume apixaban when okay with CT surgery, currently on enoxaparin  Heart rate in the 120s, improved with transition to atenolol.  Blood pressure stable in the 120s.  Increase atenolol to 37.5 mg every 8 hours.  Has had a full digoxin load  Plans to restart apixaban today, defer to CT surgery team  Status post convergent procedure/ANA occlusion POD #2  Unfortunately remains in persistent atrial fibrillation with rapid heart rates  Remains with left sided chest tube.   HTN  BP controlled  Continue current regimen  Hyperlipidemia  Hypothyroidism  Mild nonobstructive CAD    Okay for discharge from cardiovascular standpoint.  Patient has known history of atrial fibrillation with difficult to control heart rates.  As documented above, atenolol dosing has been increased.  We can continue to monitor this in the outpatient setting.  He will need a 1 week hospital follow-up, recommend follow-up with JOESPH Mai.  My scheduling team can reach out to arrange.           JOESPH Dahl  [facility]   [facility]  [address] [place] [address] - [address]  [place] [address]  Office: [phone]    03/24/24  08:01 EDT

## 2024-03-24 NOTE — PROGRESS NOTES
" LOS: 3 days   Patient Care Team:  Jackson Vázquez MD as PCP - General  Jackson Vázquez MD as PCP - Family Medicine  Ron Cowan MD as Consulting Physician (Cardiology)    Chief Complaint: post op    Subjective:  Symptoms:  No shortness of breath or chest pain.  (Wants to go home).    Diet:  Adequate intake.  No nausea or vomiting.    Activity level: Returning to normal.    Pain:  He complains of pain that is mild.  Pain is well controlled.        Vital Signs  Temp:  [97.8 °F (36.6 °C)-98.1 °F (36.7 °C)] 98.1 °F (36.7 °C)  Heart Rate:  [114-122] 114  Resp:  [17-20] 20  BP: (115-142)/(75-95) 125/85  Body mass index is 42.45 kg/m².    Intake/Output Summary (Last 24 hours) at 3/24/2024 0819  Last data filed at 3/24/2024 0754  Gross per 24 hour   Intake 620 ml   Output 1950 ml   Net -1330 ml     I/O this shift:  In: 240 [P.O.:240]  Out: -     Chest tube drainage last 8 hours: 250        03/22/24  0654 03/23/24  0659 03/24/24  0630   Weight: (!) 161 kg (354 lb 2.7 oz) (!) 158 kg (348 lb 12.3 oz) (!) 158 kg (348 lb 12.3 oz)     Objective:  General Appearance:  Comfortable and in no acute distress.    Vital signs: (most recent): Blood pressure 125/85, pulse 114, temperature 98.1 °F (36.7 °C), temperature source Oral, resp. rate 20, height 193 cm (76\"), weight (!) 158 kg (348 lb 12.3 oz), SpO2 97%.  Vital signs are normal.  No fever.    Output: Producing urine and producing stool.    Lungs:  Normal effort and normal respiratory rate.  There are decreased breath sounds.    Heart: Tachycardia.  Irregular rhythm.  (Afib 110-120)  Abdomen: Abdomen is soft.  Bowel sounds are normal.     Extremities: There is dependent edema.    Pulses: Distal pulses are intact.    Neurological: Patient is alert and oriented to person, place and time.    Skin:  Warm and dry.  (Left chest incisions clean and dry with skin glue)          Results Review:        WBC WBC   Date Value Ref Range Status   03/24/2024 10.65 3.40 - 10.80 " "10*3/mm3 Final   03/23/2024 13.57 (H) 3.40 - 10.80 10*3/mm3 Final   03/22/2024 15.04 (H) 3.40 - 10.80 10*3/mm3 Final   03/21/2024 9.70 3.40 - 10.80 10*3/mm3 Final      HGB Hemoglobin   Date Value Ref Range Status   03/24/2024 14.7 13.0 - 17.7 g/dL Final   03/23/2024 14.6 13.0 - 17.7 g/dL Final   03/22/2024 15.3 13.0 - 17.7 g/dL Final   03/21/2024 15.3 13.0 - 17.7 g/dL Final   03/21/2024 14.6 12.0 - 17.0 g/dL Final   03/21/2024 13.6 12.0 - 17.0 g/dL Final      HCT Hematocrit   Date Value Ref Range Status   03/24/2024 43.1 37.5 - 51.0 % Final   03/23/2024 43.8 37.5 - 51.0 % Final   03/22/2024 45.0 37.5 - 51.0 % Final   03/21/2024 44.0 37.5 - 51.0 % Final   03/21/2024 43 38 - 51 % Final   03/21/2024 40 38 - 51 % Final      Platelets Platelets   Date Value Ref Range Status   03/24/2024 183 140 - 450 10*3/mm3 Final   03/23/2024 163 140 - 450 10*3/mm3 Final   03/22/2024 164 140 - 450 10*3/mm3 Final   03/21/2024 168 140 - 450 10*3/mm3 Final        PT/INR:    No results found for: \"PROTIME\"  /  No results found for: \"INR\"      Sodium Sodium   Date Value Ref Range Status   03/24/2024 140 136 - 145 mmol/L Final   03/23/2024 135 (L) 136 - 145 mmol/L Final   03/22/2024 135 (L) 136 - 145 mmol/L Final   03/22/2024 140 136 - 145 mmol/L Final   03/21/2024 140 136 - 145 mmol/L Final      Potassium Potassium   Date Value Ref Range Status   03/24/2024 4.5 3.5 - 5.2 mmol/L Final   03/23/2024 4.6 3.5 - 5.2 mmol/L Final   03/22/2024 4.4 3.5 - 5.2 mmol/L Final   03/22/2024 4.7 3.5 - 5.2 mmol/L Final   03/21/2024 4.7 3.5 - 5.2 mmol/L Final   03/21/2024 4.2 3.5 - 5.2 mmol/L Final      Chloride Chloride   Date Value Ref Range Status   03/24/2024 103 98 - 107 mmol/L Final   03/23/2024 103 98 - 107 mmol/L Final   03/22/2024 102 98 - 107 mmol/L Final   03/22/2024 104 98 - 107 mmol/L Final   03/21/2024 106 98 - 107 mmol/L Final      Bicarbonate CO2   Date Value Ref Range Status   03/24/2024 28.3 22.0 - 29.0 mmol/L Final   03/23/2024 25.8 22.0 - " 29.0 mmol/L Final   03/22/2024 20.4 (L) 22.0 - 29.0 mmol/L Final   03/22/2024 24.6 22.0 - 29.0 mmol/L Final   03/21/2024 21.0 (L) 22.0 - 29.0 mmol/L Final      BUN BUN   Date Value Ref Range Status   03/24/2024 21 (H) 6 - 20 mg/dL Final   03/23/2024 24 (H) 6 - 20 mg/dL Final   03/22/2024 18 6 - 20 mg/dL Final   03/22/2024 17 6 - 20 mg/dL Final   03/21/2024 17 6 - 20 mg/dL Final      Creatinine Creatinine   Date Value Ref Range Status   03/24/2024 0.64 (L) 0.76 - 1.27 mg/dL Final   03/23/2024 0.71 (L) 0.76 - 1.27 mg/dL Final   03/22/2024 0.69 (L) 0.76 - 1.27 mg/dL Final   03/22/2024 0.83 0.76 - 1.27 mg/dL Final   03/21/2024 0.71 (L) 0.76 - 1.27 mg/dL Final      Calcium Calcium   Date Value Ref Range Status   03/24/2024 8.9 8.6 - 10.5 mg/dL Final   03/23/2024 8.4 (L) 8.6 - 10.5 mg/dL Final   03/22/2024 8.7 8.6 - 10.5 mg/dL Final   03/22/2024 8.8 8.6 - 10.5 mg/dL Final   03/21/2024 8.8 8.6 - 10.5 mg/dL Final      Magnesium Magnesium   Date Value Ref Range Status   03/22/2024 2.3 1.6 - 2.6 mg/dL Final   03/21/2024 1.8 1.6 - 2.6 mg/dL Final          apixaban, 5 mg, Oral, Q12H  atenolol, 37.5 mg, Oral, Q8H  atorvastatin, 10 mg, Oral, Nightly  dofetilide, 500 mcg, Oral, Q12H  ferrous sulfate, 325 mg, Oral, BID With Meals  gabapentin, 300 mg, Oral, BID  insulin lispro, 3-14 Units, Subcutaneous, 4x Daily AC & at Bedtime  levothyroxine, 150 mcg, Oral, Q AM  methylPREDNISolone, 4 mg, Oral, 4x Daily Taper  [START ON 3/25/2024] methylPREDNISolone, 4 mg, Oral, TID Around Food  [START ON 3/26/2024] methylPREDNISolone, 4 mg, Oral, Before Breakfast  [START ON 3/26/2024] methylPREDNISolone, 4 mg, Oral, Tonight  [START ON 3/27/2024] methylPREDNISolone, 4 mg, Oral, Before Breakfast  pantoprazole, 40 mg, Oral, QAM  senna-docusate sodium, 2 tablet, Oral, Nightly         Assessment & Plan  - persistent atrial fibrillation with hx of PVI/multiple cardioversions--on Tikosyn; LD Eliquis 3/15 s/p convergent procedure/ANA occlusion POD#3  [place]  - hypertension  - hyperlipidemia--statin therapy  - hypothyroidism  - mild non-obstructive CAD  - DM II  - DDD  - hx of TIA  - obesity class 3 with previous gastric bypass in 1984; Ozempic on hold  - leukocytosis--reactive/steroid administration    Looks good this morning, up in the chair. Pain controlled on current regimen  Remains in atrial fibrillation with improved rates  Back on Tikosyn by EP, increased atenolol per cards  Transition steroids to oral dosing  Eliquis restarted  Mobilize/encourage pulmonary toilet--cont flutter valve  Continue routine care    María Escobedo-Coty, APRN  03/24/24  08:19 EDT

## 2024-03-25 ENCOUNTER — TELEPHONE (OUTPATIENT)
Dept: CARDIAC SURGERY | Facility: CLINIC | Age: 59
End: 2024-03-25
Payer: COMMERCIAL

## 2024-03-25 LAB
BH BB BLOOD EXPIRATION DATE: NORMAL
BH BB BLOOD EXPIRATION DATE: NORMAL
BH BB BLOOD TYPE BARCODE: 5100
BH BB BLOOD TYPE BARCODE: 5100
BH BB DISPENSE STATUS: NORMAL
BH BB DISPENSE STATUS: NORMAL
BH BB PRODUCT CODE: NORMAL
BH BB PRODUCT CODE: NORMAL
BH BB UNIT NUMBER: NORMAL
BH BB UNIT NUMBER: NORMAL
CROSSMATCH INTERPRETATION: NORMAL
CROSSMATCH INTERPRETATION: NORMAL
QT INTERVAL: 294 MS
QT INTERVAL: 325 MS
QT INTERVAL: 332 MS
QTC INTERVAL: 407 MS
QTC INTERVAL: 463 MS
QTC INTERVAL: 466 MS
UNIT  ABO: NORMAL
UNIT  ABO: NORMAL
UNIT  RH: NORMAL
UNIT  RH: NORMAL

## 2024-03-25 NOTE — OUTREACH NOTE
Prep Survey      Flowsheet Row Responses   [facility] patient discharged from? [facility]   Is LACE score < 7 ? No   Eligibility Readm Mgmt   Discharge diagnosis Atrial fibrillation, persistent   Does the patient have one of the following disease processes/diagnoses(primary or secondary)? Other   Does the patient have Home health ordered? No   Is there a DME ordered? No   Prep survey completed? Yes            Katelynn FLANAGAN - Registered Nurse

## 2024-03-25 NOTE — TELEPHONE ENCOUNTER
Caller: Marcus Simon    Relationship: Self    Best call back number: [phone]    What form or medical record are you requesting: RELEASE TO WORK    Who is requesting this form or medical record from you: EMPLOYER    How would you like to receive the form or medical records (pick-up, mail, fax):       Timeframe paperwork needed: BEFORE NEXT WEEK    Additional notes: PT IS NEEDING A STATEMENT THAT HE CAN RETURN TO WORK LIGHT DUTY. PLEASE GIVE HIM  A CALL ONCE IT IS READY SO HE CAN PICK IT UP. THANK YOU

## 2024-03-28 ENCOUNTER — READMISSION MANAGEMENT (OUTPATIENT)
Dept: CALL CENTER | Facility: HOSPITAL | Age: 59
End: 2024-03-28
Payer: COMMERCIAL

## 2024-03-28 NOTE — OUTREACH NOTE
Medical Week 1 Survey      Flowsheet Row Responses   [facility] patient discharged from? [place]   Does the patient have one of the following disease processes/diagnoses(primary or secondary)? Other   Week 1 attempt successful? No   Unsuccessful attempts Attempt 1            Niharika Marinelli Registered Nurse

## 2024-04-04 ENCOUNTER — READMISSION MANAGEMENT (OUTPATIENT)
Dept: CALL CENTER | Facility: HOSPITAL | Age: 59
End: 2024-04-04
Payer: COMMERCIAL

## 2024-04-04 ENCOUNTER — OFFICE VISIT (OUTPATIENT)
Age: 59
End: 2024-04-04
Payer: COMMERCIAL

## 2024-04-04 VITALS
WEIGHT: 315 LBS | HEART RATE: 131 BPM | BODY MASS INDEX: 38.36 KG/M2 | DIASTOLIC BLOOD PRESSURE: 90 MMHG | HEIGHT: 76 IN | SYSTOLIC BLOOD PRESSURE: 142 MMHG

## 2024-04-04 DIAGNOSIS — I48.19 ATRIAL FIBRILLATION, PERSISTENT: Primary | ICD-10-CM

## 2024-04-04 DIAGNOSIS — I48.19 PERSISTENT ATRIAL FIBRILLATION: ICD-10-CM

## 2024-04-04 RX ORDER — SEMAGLUTIDE 1.34 MG/ML
INJECTION, SOLUTION SUBCUTANEOUS WEEKLY
Status: ON HOLD | COMMUNITY
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
## 2024-04-04 NOTE — OUTREACH NOTE
Medical Week 2 Survey      Flowsheet Row Responses   [facility] patient discharged from? [place]   Does the patient have one of the following disease processes/diagnoses(primary or secondary)? Other   Week 2 attempt successful? Yes   Call start time 1221   Discharge diagnosis Atrial fibrillation, persistent   Call end time 1238   Meds reviewed with patient/caregiver? Yes   Is the patient having any side effects they believe may be caused by any medication additions or changes? No   Does the patient have all medications ordered at discharge? Yes   Is the patient taking all medications as directed (includes completed medication regime)? Yes   Does the patient have a primary care provider?  Yes   Does the patient have an appointment with their PCP within 7 days of discharge? Yes   Has the patient kept scheduled appointments due by today? Yes   Has home health visited the patient within 72 hours of discharge? N/A   Psychosocial issues? No   Comments area of chest tube removal having some drainage, instructed by MD to paint with Betadine and leave open to air   Did the patient receive a copy of their discharge instructions? Yes   Nursing interventions Reviewed instructions with patient   What is the patient's perception of their health status since discharge? Improving   Is the patient/caregiver able to teach back signs and symptoms related to disease process for when to call PCP? Yes   Is the patient/caregiver able to teach back signs and symptoms related to disease process for when to call 911? Yes   Is the patient/caregiver able to teach back the hierarchy of who to call/visit for symptoms/problems? PCP, Specialist, Home health nurse, Urgent Care, ED, 911 Yes   Additional teach back comments states coughing up slightly yellow mucus when using incentive spirometer, encouraged to continue with pulmonary toilet, watch for signs of infection from chest tube site, fevers, etc   Week 2 Call Completed? Yes   Call  end time [phone]            Joanne FLANAGAN - Registered Nurse

## 2024-04-07 ENCOUNTER — HOSPITAL ENCOUNTER (INPATIENT)
Facility: HOSPITAL | Age: 59
LOS: 3 days | Discharge: HOME | End: 2024-04-10
Attending: STUDENT IN AN ORGANIZED HEALTH CARE EDUCATION/TRAINING PROGRAM | Admitting: INTERNAL MEDICINE
Payer: COMMERCIAL

## 2024-04-07 ENCOUNTER — APPOINTMENT (OUTPATIENT)
Dept: GENERAL RADIOLOGY | Facility: HOSPITAL | Age: 59
End: 2024-04-07
Payer: COMMERCIAL

## 2024-04-07 ENCOUNTER — APPOINTMENT (OUTPATIENT)
Dept: CT IMAGING | Facility: HOSPITAL | Age: 59
End: 2024-04-07
Payer: COMMERCIAL

## 2024-04-07 ENCOUNTER — READMISSION MANAGEMENT (OUTPATIENT)
Dept: CALL CENTER | Facility: HOSPITAL | Age: 59
End: 2024-04-07
Payer: COMMERCIAL

## 2024-04-07 DIAGNOSIS — R91.8 PULMONARY NODULES: ICD-10-CM

## 2024-04-07 DIAGNOSIS — G47.34 NOCTURNAL HYPOXIA: ICD-10-CM

## 2024-04-07 DIAGNOSIS — A41.9 SEPSIS, DUE TO UNSPECIFIED ORGANISM, UNSPECIFIED WHETHER ACUTE ORGAN DYSFUNCTION PRESENT: ICD-10-CM

## 2024-04-07 DIAGNOSIS — I48.91 ATRIAL FIBRILLATION WITH RVR: Primary | ICD-10-CM

## 2024-04-07 LAB
ALBUMIN SERPL-MCNC: 4.2 G/DL (ref 3.5–5.2)
ALBUMIN/GLOB SERPL: 1.8 G/DL
ALP SERPL-CCNC: 124 U/L (ref 39–117)
ALT SERPL W P-5'-P-CCNC: 25 U/L (ref 1–41)
ANION GAP SERPL CALCULATED.3IONS-SCNC: 12 MMOL/L (ref 5–15)
AST SERPL-CCNC: 15 U/L (ref 1–40)
B PARAPERT DNA SPEC QL NAA+PROBE: NOT DETECTED
B PERT DNA SPEC QL NAA+PROBE: NOT DETECTED
BACTERIA UR QL AUTO: NORMAL /HPF
BASOPHILS # BLD AUTO: 0.03 10*3/MM3 (ref 0–0.2)
BASOPHILS NFR BLD AUTO: 0.3 % (ref 0–1.5)
BILIRUB SERPL-MCNC: 0.9 MG/DL (ref 0–1.2)
BILIRUB UR QL STRIP: NEGATIVE
BUN SERPL-MCNC: 11 MG/DL (ref 6–20)
BUN/CREAT SERPL: 15.3 (ref 7–25)
C PNEUM DNA NPH QL NAA+NON-PROBE: NOT DETECTED
CALCIUM SPEC-SCNC: 9.1 MG/DL (ref 8.6–10.5)
CHLORIDE SERPL-SCNC: 102 MMOL/L (ref 98–107)
CLARITY UR: CLEAR
CO2 SERPL-SCNC: 26 MMOL/L (ref 22–29)
COLOR UR: YELLOW
CREAT SERPL-MCNC: 0.72 MG/DL (ref 0.76–1.27)
D-LACTATE SERPL-SCNC: 1.4 MMOL/L (ref 0.5–2)
D-LACTATE SERPL-SCNC: 2.3 MMOL/L (ref 0.5–2)
DEPRECATED RDW RBC AUTO: 47.3 FL (ref 37–54)
EGFRCR SERPLBLD CKD-EPI 2021: 105.9 ML/MIN/1.73
EOSINOPHIL # BLD AUTO: 0.04 10*3/MM3 (ref 0–0.4)
EOSINOPHIL NFR BLD AUTO: 0.4 % (ref 0.3–6.2)
ERYTHROCYTE [DISTWIDTH] IN BLOOD BY AUTOMATED COUNT: 12.6 % (ref 12.3–15.4)
FLUAV SUBTYP SPEC NAA+PROBE: NOT DETECTED
FLUBV RNA ISLT QL NAA+PROBE: NOT DETECTED
GEN 5 2HR TROPONIN T REFLEX: 36 NG/L
GLOBULIN UR ELPH-MCNC: 2.3 GM/DL
GLUCOSE SERPL-MCNC: 170 MG/DL (ref 65–99)
GLUCOSE UR STRIP-MCNC: NEGATIVE MG/DL
HADV DNA SPEC NAA+PROBE: NOT DETECTED
HCOV 229E RNA SPEC QL NAA+PROBE: NOT DETECTED
HCOV HKU1 RNA SPEC QL NAA+PROBE: NOT DETECTED
HCOV NL63 RNA SPEC QL NAA+PROBE: NOT DETECTED
HCOV OC43 RNA SPEC QL NAA+PROBE: NOT DETECTED
HCT VFR BLD AUTO: 45.5 % (ref 37.5–51)
HGB BLD-MCNC: 15.2 G/DL (ref 13–17.7)
HGB UR QL STRIP.AUTO: NEGATIVE
HMPV RNA NPH QL NAA+NON-PROBE: NOT DETECTED
HOLD SPECIMEN: NORMAL
HOLD SPECIMEN: NORMAL
HPIV1 RNA ISLT QL NAA+PROBE: NOT DETECTED
HPIV2 RNA SPEC QL NAA+PROBE: NOT DETECTED
HPIV3 RNA NPH QL NAA+PROBE: NOT DETECTED
HPIV4 P GENE NPH QL NAA+PROBE: NOT DETECTED
HYALINE CASTS UR QL AUTO: NORMAL /LPF
IMM GRANULOCYTES # BLD AUTO: 0.03 10*3/MM3 (ref 0–0.05)
IMM GRANULOCYTES NFR BLD AUTO: 0.3 % (ref 0–0.5)
KETONES UR QL STRIP: ABNORMAL
LEUKOCYTE ESTERASE UR QL STRIP.AUTO: NEGATIVE
LYMPHOCYTES # BLD AUTO: 0.8 10*3/MM3 (ref 0.7–3.1)
LYMPHOCYTES NFR BLD AUTO: 8 % (ref 19.6–45.3)
M PNEUMO IGG SER IA-ACNC: NOT DETECTED
MCH RBC QN AUTO: 34.3 PG (ref 26.6–33)
MCHC RBC AUTO-ENTMCNC: 33.4 G/DL (ref 31.5–35.7)
MCV RBC AUTO: 102.7 FL (ref 79–97)
MONOCYTES # BLD AUTO: 0.74 10*3/MM3 (ref 0.1–0.9)
MONOCYTES NFR BLD AUTO: 7.4 % (ref 5–12)
NEUTROPHILS NFR BLD AUTO: 8.35 10*3/MM3 (ref 1.7–7)
NEUTROPHILS NFR BLD AUTO: 83.6 % (ref 42.7–76)
NITRITE UR QL STRIP: NEGATIVE
NRBC BLD AUTO-RTO: 0 /100 WBC (ref 0–0.2)
NT-PROBNP SERPL-MCNC: 2075 PG/ML (ref 0–900)
PH UR STRIP.AUTO: 6 [PH] (ref 5–8)
PLATELET # BLD AUTO: 233 10*3/MM3 (ref 140–450)
PMV BLD AUTO: 9.4 FL (ref 6–12)
POTASSIUM SERPL-SCNC: 4.7 MMOL/L (ref 3.5–5.2)
PROCALCITONIN SERPL-MCNC: 0.04 NG/ML (ref 0–0.25)
PROT SERPL-MCNC: 6.5 G/DL (ref 6–8.5)
PROT UR QL STRIP: ABNORMAL
RBC # BLD AUTO: 4.43 10*6/MM3 (ref 4.14–5.8)
RBC # UR STRIP: NORMAL /HPF
REF LAB TEST METHOD: NORMAL
RHINOVIRUS RNA SPEC NAA+PROBE: NOT DETECTED
RSV RNA NPH QL NAA+NON-PROBE: NOT DETECTED
SARS-COV-2 RNA NPH QL NAA+NON-PROBE: NOT DETECTED
SODIUM SERPL-SCNC: 140 MMOL/L (ref 136–145)
SP GR UR STRIP: >=1.03 (ref 1–1.03)
SQUAMOUS #/AREA URNS HPF: NORMAL /HPF
TROPONIN T DELTA: 1 NG/L
TROPONIN T SERPL HS-MCNC: 35 NG/L
UROBILINOGEN UR QL STRIP: ABNORMAL
WBC # UR STRIP: NORMAL /HPF
WBC NRBC COR # BLD AUTO: 9.99 10*3/MM3 (ref 3.4–10.8)
WHOLE BLOOD HOLD COAG: NORMAL
WHOLE BLOOD HOLD SPECIMEN: NORMAL

## 2024-04-07 PROCEDURE — 84484 ASSAY OF TROPONIN QUANT: CPT | Performed by: STUDENT IN AN ORGANIZED HEALTH CARE EDUCATION/TRAINING PROGRAM

## 2024-04-07 PROCEDURE — 93010 ELECTROCARDIOGRAM REPORT: CPT | Performed by: INTERNAL MEDICINE

## 2024-04-07 PROCEDURE — 84145 PROCALCITONIN (PCT): CPT | Performed by: INTERNAL MEDICINE

## 2024-04-07 PROCEDURE — 83605 ASSAY OF LACTIC ACID: CPT | Performed by: STUDENT IN AN ORGANIZED HEALTH CARE EDUCATION/TRAINING PROGRAM

## 2024-04-07 PROCEDURE — 99291 CRITICAL CARE FIRST HOUR: CPT

## 2024-04-07 PROCEDURE — 36415 COLL VENOUS BLD VENIPUNCTURE: CPT

## 2024-04-07 PROCEDURE — 25010000002 AZITHROMYCIN PER 500 MG: Performed by: STUDENT IN AN ORGANIZED HEALTH CARE EDUCATION/TRAINING PROGRAM

## 2024-04-07 PROCEDURE — 93005 ELECTROCARDIOGRAM TRACING: CPT

## 2024-04-07 PROCEDURE — 93005 ELECTROCARDIOGRAM TRACING: CPT | Performed by: STUDENT IN AN ORGANIZED HEALTH CARE EDUCATION/TRAINING PROGRAM

## 2024-04-07 PROCEDURE — 25810000003 SODIUM CHLORIDE 0.9 % SOLUTION: Performed by: STUDENT IN AN ORGANIZED HEALTH CARE EDUCATION/TRAINING PROGRAM

## 2024-04-07 PROCEDURE — 81001 URINALYSIS AUTO W/SCOPE: CPT | Performed by: STUDENT IN AN ORGANIZED HEALTH CARE EDUCATION/TRAINING PROGRAM

## 2024-04-07 PROCEDURE — 80053 COMPREHEN METABOLIC PANEL: CPT | Performed by: STUDENT IN AN ORGANIZED HEALTH CARE EDUCATION/TRAINING PROGRAM

## 2024-04-07 PROCEDURE — 71045 X-RAY EXAM CHEST 1 VIEW: CPT

## 2024-04-07 PROCEDURE — 71275 CT ANGIOGRAPHY CHEST: CPT

## 2024-04-07 PROCEDURE — 25010000002 CEFTRIAXONE PER 250 MG: Performed by: STUDENT IN AN ORGANIZED HEALTH CARE EDUCATION/TRAINING PROGRAM

## 2024-04-07 PROCEDURE — 85025 COMPLETE CBC W/AUTO DIFF WBC: CPT | Performed by: STUDENT IN AN ORGANIZED HEALTH CARE EDUCATION/TRAINING PROGRAM

## 2024-04-07 PROCEDURE — 25010000002 KETOROLAC TROMETHAMINE PER 15 MG: Performed by: STUDENT IN AN ORGANIZED HEALTH CARE EDUCATION/TRAINING PROGRAM

## 2024-04-07 PROCEDURE — 87040 BLOOD CULTURE FOR BACTERIA: CPT | Performed by: STUDENT IN AN ORGANIZED HEALTH CARE EDUCATION/TRAINING PROGRAM

## 2024-04-07 PROCEDURE — 0202U NFCT DS 22 TRGT SARS-COV-2: CPT | Performed by: STUDENT IN AN ORGANIZED HEALTH CARE EDUCATION/TRAINING PROGRAM

## 2024-04-07 PROCEDURE — 25810000003 SODIUM CHLORIDE 0.9 % SOLUTION 250 ML FLEX CONT: Performed by: STUDENT IN AN ORGANIZED HEALTH CARE EDUCATION/TRAINING PROGRAM

## 2024-04-07 PROCEDURE — 25510000001 IOPAMIDOL PER 1 ML: Performed by: STUDENT IN AN ORGANIZED HEALTH CARE EDUCATION/TRAINING PROGRAM

## 2024-04-07 PROCEDURE — 83880 ASSAY OF NATRIURETIC PEPTIDE: CPT | Performed by: INTERNAL MEDICINE

## 2024-04-07 RX ORDER — NITROGLYCERIN 0.4 MG/1
0.4 TABLET SUBLINGUAL
Status: DISCONTINUED | OUTPATIENT
Start: 2024-04-07 | End: 2024-04-10 | Stop reason: HOSPADM

## 2024-04-07 RX ORDER — KETOROLAC TROMETHAMINE 15 MG/ML
15 INJECTION, SOLUTION INTRAMUSCULAR; INTRAVENOUS ONCE
Status: COMPLETED | OUTPATIENT
Start: 2024-04-07 | End: 2024-04-07

## 2024-04-07 RX ORDER — ACETAMINOPHEN 650 MG/1
650 SUPPOSITORY RECTAL EVERY 4 HOURS PRN
Status: DISCONTINUED | OUTPATIENT
Start: 2024-04-07 | End: 2024-04-10 | Stop reason: HOSPADM

## 2024-04-07 RX ORDER — ACETAMINOPHEN 160 MG/5ML
650 SOLUTION ORAL EVERY 4 HOURS PRN
Status: DISCONTINUED | OUTPATIENT
Start: 2024-04-07 | End: 2024-04-10 | Stop reason: HOSPADM

## 2024-04-07 RX ORDER — BISACODYL 10 MG
10 SUPPOSITORY, RECTAL RECTAL DAILY PRN
Status: DISCONTINUED | OUTPATIENT
Start: 2024-04-07 | End: 2024-04-10 | Stop reason: HOSPADM

## 2024-04-07 RX ORDER — DOFETILIDE 0.5 MG/1
500 CAPSULE ORAL EVERY 12 HOURS
Status: DISCONTINUED | OUTPATIENT
Start: 2024-04-07 | End: 2024-04-07

## 2024-04-07 RX ORDER — DILTIAZEM HYDROCHLORIDE 5 MG/ML
20 INJECTION INTRAVENOUS ONCE
Status: COMPLETED | OUTPATIENT
Start: 2024-04-07 | End: 2024-04-07

## 2024-04-07 RX ORDER — LEVOTHYROXINE SODIUM 0.15 MG/1
150 TABLET ORAL
Status: DISCONTINUED | OUTPATIENT
Start: 2024-04-08 | End: 2024-04-10 | Stop reason: HOSPADM

## 2024-04-07 RX ORDER — GABAPENTIN 300 MG/1
300 CAPSULE ORAL 2 TIMES DAILY
Status: DISCONTINUED | OUTPATIENT
Start: 2024-04-07 | End: 2024-04-10 | Stop reason: HOSPADM

## 2024-04-07 RX ORDER — BISACODYL 5 MG/1
5 TABLET, DELAYED RELEASE ORAL DAILY PRN
Status: DISCONTINUED | OUTPATIENT
Start: 2024-04-07 | End: 2024-04-10 | Stop reason: HOSPADM

## 2024-04-07 RX ORDER — SODIUM CHLORIDE 9 MG/ML
40 INJECTION, SOLUTION INTRAVENOUS AS NEEDED
Status: DISCONTINUED | OUTPATIENT
Start: 2024-04-07 | End: 2024-04-10 | Stop reason: HOSPADM

## 2024-04-07 RX ORDER — SODIUM CHLORIDE 0.9 % (FLUSH) 0.9 %
10 SYRINGE (ML) INJECTION AS NEEDED
Status: DISCONTINUED | OUTPATIENT
Start: 2024-04-07 | End: 2024-04-10 | Stop reason: HOSPADM

## 2024-04-07 RX ORDER — HYDROCODONE BITARTRATE AND ACETAMINOPHEN 5; 325 MG/1; MG/1
1 TABLET ORAL EVERY 4 HOURS PRN
Status: DISCONTINUED | OUTPATIENT
Start: 2024-04-07 | End: 2024-04-10 | Stop reason: HOSPADM

## 2024-04-07 RX ORDER — SODIUM CHLORIDE 0.9 % (FLUSH) 0.9 %
10 SYRINGE (ML) INJECTION EVERY 12 HOURS SCHEDULED
Status: DISCONTINUED | OUTPATIENT
Start: 2024-04-07 | End: 2024-04-10 | Stop reason: HOSPADM

## 2024-04-07 RX ORDER — AMOXICILLIN 250 MG
2 CAPSULE ORAL 2 TIMES DAILY PRN
Status: DISCONTINUED | OUTPATIENT
Start: 2024-04-07 | End: 2024-04-10 | Stop reason: HOSPADM

## 2024-04-07 RX ORDER — DEXTROSE MONOHYDRATE 25 G/50ML
25 INJECTION, SOLUTION INTRAVENOUS
Status: DISCONTINUED | OUTPATIENT
Start: 2024-04-07 | End: 2024-04-10 | Stop reason: HOSPADM

## 2024-04-07 RX ORDER — BACLOFEN 10 MG/1
10 TABLET ORAL 3 TIMES DAILY PRN
Status: DISCONTINUED | OUTPATIENT
Start: 2024-04-07 | End: 2024-04-10 | Stop reason: HOSPADM

## 2024-04-07 RX ORDER — NICOTINE POLACRILEX 4 MG
15 LOZENGE BUCCAL
Status: DISCONTINUED | OUTPATIENT
Start: 2024-04-07 | End: 2024-04-10 | Stop reason: HOSPADM

## 2024-04-07 RX ORDER — ATENOLOL 25 MG/1
37.5 TABLET ORAL EVERY 8 HOURS
Status: DISCONTINUED | OUTPATIENT
Start: 2024-04-07 | End: 2024-04-08

## 2024-04-07 RX ORDER — ONDANSETRON 2 MG/ML
4 INJECTION INTRAMUSCULAR; INTRAVENOUS EVERY 6 HOURS PRN
Status: DISCONTINUED | OUTPATIENT
Start: 2024-04-07 | End: 2024-04-10 | Stop reason: HOSPADM

## 2024-04-07 RX ORDER — ACETAMINOPHEN 325 MG/1
650 TABLET ORAL EVERY 4 HOURS PRN
Status: DISCONTINUED | OUTPATIENT
Start: 2024-04-07 | End: 2024-04-10 | Stop reason: HOSPADM

## 2024-04-07 RX ORDER — ASPIRIN 325 MG
325 TABLET ORAL ONCE
Status: DISCONTINUED | OUTPATIENT
Start: 2024-04-07 | End: 2024-04-07

## 2024-04-07 RX ORDER — ATORVASTATIN CALCIUM 20 MG/1
10 TABLET, FILM COATED ORAL NIGHTLY
Status: DISCONTINUED | OUTPATIENT
Start: 2024-04-07 | End: 2024-04-10 | Stop reason: HOSPADM

## 2024-04-07 RX ORDER — INSULIN LISPRO 100 [IU]/ML
2-9 INJECTION, SOLUTION INTRAVENOUS; SUBCUTANEOUS
Status: DISCONTINUED | OUTPATIENT
Start: 2024-04-07 | End: 2024-04-10 | Stop reason: HOSPADM

## 2024-04-07 RX ORDER — POLYETHYLENE GLYCOL 3350 17 G/17G
17 POWDER, FOR SOLUTION ORAL DAILY PRN
Status: DISCONTINUED | OUTPATIENT
Start: 2024-04-07 | End: 2024-04-10 | Stop reason: HOSPADM

## 2024-04-07 RX ORDER — ONDANSETRON 4 MG/1
4 TABLET, ORALLY DISINTEGRATING ORAL EVERY 6 HOURS PRN
Status: DISCONTINUED | OUTPATIENT
Start: 2024-04-07 | End: 2024-04-10 | Stop reason: HOSPADM

## 2024-04-07 RX ORDER — IBUPROFEN 600 MG/1
1 TABLET ORAL
Status: DISCONTINUED | OUTPATIENT
Start: 2024-04-07 | End: 2024-04-10 | Stop reason: HOSPADM

## 2024-04-07 RX ADMIN — DILTIAZEM HYDROCHLORIDE 20 MG: 5 INJECTION INTRAVENOUS at 15:45

## 2024-04-07 RX ADMIN — APIXABAN 5 MG: 5 TABLET, FILM COATED ORAL at 23:32

## 2024-04-07 RX ADMIN — AZITHROMYCIN MONOHYDRATE 500 MG: 500 INJECTION, POWDER, LYOPHILIZED, FOR SOLUTION INTRAVENOUS at 18:30

## 2024-04-07 RX ADMIN — KETOROLAC TROMETHAMINE 15 MG: 15 INJECTION, SOLUTION INTRAMUSCULAR; INTRAVENOUS at 16:39

## 2024-04-07 RX ADMIN — CEFTRIAXONE 2000 MG: 2 INJECTION, POWDER, FOR SOLUTION INTRAMUSCULAR; INTRAVENOUS at 17:34

## 2024-04-07 RX ADMIN — IOPAMIDOL 95 ML: 755 INJECTION, SOLUTION INTRAVENOUS at 17:26

## 2024-04-07 RX ADMIN — SODIUM CHLORIDE 1000 ML: 9 INJECTION, SOLUTION INTRAVENOUS at 16:36

## 2024-04-07 RX ADMIN — ATORVASTATIN CALCIUM 10 MG: 20 TABLET, FILM COATED ORAL at 23:32

## 2024-04-07 RX ADMIN — GABAPENTIN 300 MG: 300 CAPSULE ORAL at 23:32

## 2024-04-07 RX ADMIN — Medication 10 ML: at 23:35

## 2024-04-07 RX ADMIN — SODIUM CHLORIDE 5 MG/HR: 900 INJECTION, SOLUTION INTRAVENOUS at 15:55

## 2024-04-07 RX ADMIN — SODIUM CHLORIDE 15 MG/HR: 900 INJECTION, SOLUTION INTRAVENOUS at 23:29
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
## 2024-04-07 NOTE — ED NOTES
LOV: 4.10.17 with RTC in 3 months  Pending appointment: none    Pt has been receiving monthly refills with monthly reminder to RTC x 3     Refilled x 1 more month per protocol  Message sent to patient via doForms with appointment reminder.   Pt has had cp since 1100.   Pt had a cardioversion and a maze procedure last week

## 2024-04-07 NOTE — ED NOTES
Nursing report ED to floor  Marcus Simon  59 y.o.  male    HPI :  HPI (Adult)  Stated Reason for Visit: cp  History Obtained From: patient    Chief Complaint  Chief Complaint   Patient presents with    Chest Pain       Admitting doctor:   Onel Watt MD    Admitting diagnosis:   The primary encounter diagnosis was Atrial fibrillation with RVR. A diagnosis of Sepsis, due to unspecified organism, unspecified whether acute organ dysfunction present was also pertinent to this visit.    Code status:   Current Code Status       Date Active Code Status Order ID Comments User Context       Prior            Allergies:   Sulfa antibiotics and Penicillins    Isolation:   No active isolations    Intake and Output  No intake or output data in the 24 hours ending 04/07/24 1900    Weight:       04/07/24  1533   Weight: (!) 154 kg (340 lb)       Most recent vitals:   Vitals:    04/07/24 1816 04/07/24 1820 04/07/24 1830 04/07/24 1831   BP: 140/84  138/97 138/97   BP Location:       Patient Position:       Pulse: 115  107 110   Resp:    18   Temp:  99.1 °F (37.3 °C)     TempSrc:  Tympanic     SpO2: 90%  93% 92%   Weight:       Height:           Active LDAs/IV Access:   Lines, Drains & Airways       Active LDAs       Name Placement date Placement time Site Days    Peripheral IV 04/07/24 1544 Left Antecubital 04/07/24  1544  Antecubital  less than 1    Peripheral IV 04/07/24 1554 Posterior;Right Hand 04/07/24  1554  Hand  less than 1                    Labs (abnormal labs have a star):   Labs Reviewed   COMPREHENSIVE METABOLIC PANEL - Abnormal; Notable for the following components:       Result Value    Glucose 170 (*)     Creatinine 0.72 (*)     Alkaline Phosphatase 124 (*)     All other components within normal limits    Narrative:     GFR Normal >60  Chronic Kidney Disease <60  Kidney Failure <15     TROPONIN - Abnormal; Notable for the following components:    HS Troponin T 35 (*)     All other components within normal  limits    Narrative:     High Sensitive Troponin T Reference Range:  <14.0 ng/L- Negative Female for AMI  <22.0 ng/L- Negative Male for AMI  >=14 - Abnormal Female indicating possible myocardial injury.  >=22 - Abnormal Male indicating possible myocardial injury.   Clinicians would have to utilize clinical acumen, EKG, Troponin, and serial changes to determine if it is an Acute Myocardial Infarction or myocardial injury due to an underlying chronic condition.        CBC WITH AUTO DIFFERENTIAL - Abnormal; Notable for the following components:    .7 (*)     MCH 34.3 (*)     Neutrophil % 83.6 (*)     Lymphocyte % 8.0 (*)     Neutrophils, Absolute 8.35 (*)     All other components within normal limits   HIGH SENSITIVITIY TROPONIN T 2HR - Abnormal; Notable for the following components:    HS Troponin T 36 (*)     All other components within normal limits    Narrative:     High Sensitive Troponin T Reference Range:  <14.0 ng/L- Negative Female for AMI  <22.0 ng/L- Negative Male for AMI  >=14 - Abnormal Female indicating possible myocardial injury.  >=22 - Abnormal Male indicating possible myocardial injury.   Clinicians would have to utilize clinical acumen, EKG, Troponin, and serial changes to determine if it is an Acute Myocardial Infarction or myocardial injury due to an underlying chronic condition.        LACTIC ACID, PLASMA - Abnormal; Notable for the following components:    Lactate 2.3 (*)     All other components within normal limits   RESPIRATORY PANEL PCR W/ COVID-19 (SARS-COV-2), NP SWAB IN UTM/VTP, 2 HR TAT - Normal    Narrative:     In the setting of a positive respiratory panel with a viral infection PLUS a negative procalcitonin without other underlying concern for bacterial infection, consider observing off antibiotics or discontinuation of antibiotics and continue supportive care. If the respiratory panel is positive for atypical bacterial infection (Bordetella pertussis, Chlamydophila pneumoniae,  or Mycoplasma pneumoniae), consider antibiotic de-escalation to target atypical bacterial infection.   BLOOD CULTURE   BLOOD CULTURE   RAINBOW DRAW    Narrative:     The following orders were created for panel order [place] Draw.  Procedure                               Abnormality         Status                     ---------                               -----------         ------                     Green Top (Gel)[[id number]]                                  Final result               Lavender Top[[id number]]                                     Final result               Gold Top - SST[[id number]]                                   Final result               Light Blue Top[[id number]]                                   Final result                 Please view results for these tests on the individual orders.   LACTIC ACID, REFLEX   URINALYSIS W/ MICROSCOPIC IF INDICATED (NO CULTURE)   CBC AND DIFFERENTIAL    Narrative:     The following orders were created for panel order CBC & Differential.  Procedure                               Abnormality         Status                     ---------                               -----------         ------                     CBC Auto Differential[[id number]]        Abnormal            Final result                 Please view results for these tests on the individual orders.   GREEN TOP   LAVENDER TOP   GOLD TOP - SST   LIGHT BLUE TOP       EKG:   ECG 12 Lead ED Triage Standing Order; Chest Pain   Preliminary Result   HEART RATE= 188  bpm   RR Interval= 319  ms   VA Interval=   ms   P Horizontal Axis=   deg   P Front Axis=   deg   QRSD Interval= 93  ms   QT Interval= 287  ms   QTcB= 508  ms   QRS Axis= 102  deg   T Wave Axis= 260  deg   - ABNORMAL ECG -   Atrial fibrillation with rapid V-rate   Ventricular premature complex   Right axis deviation   Low voltage, extremity leads   Minimal ST depression   Nonspecific T abnormalities, lateral leads   Baseline wander in lead(s) V4    Electronically Signed By:    Date and Time of Study: 2024-04-07 15:34:41          Meds given in ED:   Medications   sodium chloride 0.9 % flush 10 mL (has no administration in time range)   dilTIAZem (CARDIZEM) 100 mg in 100 mL NS infusion (ADV) (15 mg/hr Intravenous Currently Infusing 4/7/24 1836)   azithromycin (ZITHROMAX) 500 mg in sodium chloride 0.9 % 250 mL IVPB-VTB (500 mg Intravenous New Bag 4/7/24 1830)   dilTIAZem (CARDIZEM) injection 20 mg (20 mg Intravenous Given 4/7/24 1545)   ketorolac (TORADOL) injection 15 mg (15 mg Intravenous Given 4/7/24 1639)   sodium chloride 0.9 % bolus 1,000 mL (0 mL Intravenous Stopped 4/7/24 1809)   cefTRIAXone (ROCEPHIN) 2,000 mg in sodium chloride 0.9 % 100 mL MBP (0 mg Intravenous Stopped 4/7/24 1810)   iopamidol (ISOVUE-370) 76 % injection 100 mL (95 mL Intravenous Given 4/7/24 1726)       Imaging results:  CT Angiogram Chest Pulmonary Embolism    Result Date: 4/7/2024  1. No evidence for pulmonary thromboembolic disease. 2. New small pericardial effusion. This may be related to recent surgery. Pericarditis could be considered in the proper clinical setting. 3. New 15 x 10 mm right perihilar nodule within the right upper lobe is favored to be inflammatory or infectious in etiology. Recommend follow-up to resolution. 4. No change in 12 m left lower lobe nodule and additional sub-5 mm nodules when compared to the examination approximately 2 months ago. Continued CT surveillance recommended. 5. New small left greater than right pleural effusions.  Radiation dose reduction techniques were utilized, including automated exposure control and exposure modulation based on body size.   This report was finalized on 4/7/2024 6:12 PM by Dr. Harsh Barraza M.D on Workstation: [id number]      XR Chest 1 View    Result Date: 4/7/2024  Cardiomegaly. No evidence for active disease in the chest.  This report was finalized on 4/7/2024 4:41 PM by Dr. Harsh Barraza M.D on  Workstation: [id number]       Ambulatory status:   - Stand-by assist; needs oxygen    Social issues:   Social History     Socioeconomic History    Marital status:    Tobacco Use    Smoking status: Never    Smokeless tobacco: Never   Vaping Use    Vaping status: Never Used   Substance and Sexual Activity    Alcohol use: No    Drug use: No    Sexual activity: Defer       Peripheral Neurovascular  Peripheral Neurovascular (Adult)  Peripheral Neurovascular WDL: WDL, pulse assessment  Pulse Assessment: radial    Neuro Cognitive  Neuro Cognitive (Adult)  Cognitive/Neuro/Behavioral WDL: WDL, level of consciousness, orientation  Level of Consciousness: Alert  Orientation: oriented x 4    Learning  Learning Assessment (Adult)  Learning Readiness and Ability: no barriers identified    Respiratory  Respiratory (Adult)  Airway WDL: WDL  Respiratory WDL  Respiratory WDL: .WDL except, rhythm/pattern  Rhythm/Pattern, Respiratory: shortness of breath, depth regular, pattern regular, tachypneic    Abdominal Pain       Pain Assessments  Pain (Adult)  (0-10) Pain Rating: Rest: 9  Response to Pain Interventions: interventions effective per patient    NIH Stroke Scale       Elana Coreas RN  04/07/24 19:00 EDT

## 2024-04-08 ENCOUNTER — APPOINTMENT (OUTPATIENT)
Dept: CARDIOLOGY | Facility: HOSPITAL | Age: 59
End: 2024-04-08
Payer: COMMERCIAL

## 2024-04-08 LAB
ALBUMIN SERPL-MCNC: 3.5 G/DL (ref 3.5–5.2)
ALBUMIN/GLOB SERPL: 1.7 G/DL
ALP SERPL-CCNC: 98 U/L (ref 39–117)
ALT SERPL W P-5'-P-CCNC: 19 U/L (ref 1–41)
ANION GAP SERPL CALCULATED.3IONS-SCNC: 12 MMOL/L (ref 5–15)
ASCENDING AORTA: 3.2 CM
AST SERPL-CCNC: 13 U/L (ref 1–40)
BASOPHILS # BLD AUTO: 0.02 10*3/MM3 (ref 0–0.2)
BASOPHILS NFR BLD AUTO: 0.3 % (ref 0–1.5)
BH CV ECHO MEAS - ACS: 1.99 CM
BH CV ECHO MEAS - AO MAX PG: 5.3 MMHG
BH CV ECHO MEAS - AO MEAN PG: 2.38 MMHG
BH CV ECHO MEAS - AO ROOT DIAM: 3.8 CM
BH CV ECHO MEAS - AO V2 MAX: 115.1 CM/SEC
BH CV ECHO MEAS - AO V2 VTI: 19.2 CM
BH CV ECHO MEAS - AVA(I,D): 2.08 CM2
BH CV ECHO MEAS - EDV(CUBED): 72 ML
BH CV ECHO MEAS - EDV(MOD-SP2): 113 ML
BH CV ECHO MEAS - EDV(MOD-SP4): 122 ML
BH CV ECHO MEAS - EF(MOD-BP): 43.2 %
BH CV ECHO MEAS - EF(MOD-SP2): 42.5 %
BH CV ECHO MEAS - EF(MOD-SP4): 46.7 %
BH CV ECHO MEAS - ESV(CUBED): 38.8 ML
BH CV ECHO MEAS - ESV(MOD-SP2): 65 ML
BH CV ECHO MEAS - ESV(MOD-SP4): 65 ML
BH CV ECHO MEAS - FS: 18.6 %
BH CV ECHO MEAS - IVS/LVPW: 0.94 CM
BH CV ECHO MEAS - IVSD: 1.07 CM
BH CV ECHO MEAS - LAT PEAK E' VEL: 8.9 CM/SEC
BH CV ECHO MEAS - LV MASS(C)D: 156.1 GRAMS
BH CV ECHO MEAS - LV MAX PG: 2.19 MMHG
BH CV ECHO MEAS - LV MEAN PG: 1.13 MMHG
BH CV ECHO MEAS - LV V1 MAX: 74 CM/SEC
BH CV ECHO MEAS - LV V1 VTI: 13.4 CM
BH CV ECHO MEAS - LVIDD: 4.2 CM
BH CV ECHO MEAS - LVIDS: 3.4 CM
BH CV ECHO MEAS - LVOT AREA: 3 CM2
BH CV ECHO MEAS - LVOT DIAM: 1.95 CM
BH CV ECHO MEAS - LVPWD: 1.14 CM
BH CV ECHO MEAS - MED PEAK E' VEL: 7.3 CM/SEC
BH CV ECHO MEAS - MV DEC SLOPE: 469.9 CM/SEC2
BH CV ECHO MEAS - MV DEC TIME: 0.24 SEC
BH CV ECHO MEAS - MV E MAX VEL: 90 CM/SEC
BH CV ECHO MEAS - MV MAX PG: 6.3 MMHG
BH CV ECHO MEAS - MV MEAN PG: 1.94 MMHG
BH CV ECHO MEAS - MV P1/2T: 73.8 MSEC
BH CV ECHO MEAS - MV V2 VTI: 27.7 CM
BH CV ECHO MEAS - MVA(P1/2T): 3 CM2
BH CV ECHO MEAS - MVA(VTI): 1.44 CM2
BH CV ECHO MEAS - PA ACC TIME: 0.19 SEC
BH CV ECHO MEAS - RAP SYSTOLE: 3 MMHG
BH CV ECHO MEAS - RV MAX PG: 0.89 MMHG
BH CV ECHO MEAS - RV V1 MAX: 47.1 CM/SEC
BH CV ECHO MEAS - RV V1 VTI: 10 CM
BH CV ECHO MEAS - RVSP: 30 MMHG
BH CV ECHO MEAS - SV(LVOT): 40 ML
BH CV ECHO MEAS - SV(MOD-SP2): 48 ML
BH CV ECHO MEAS - SV(MOD-SP4): 57 ML
BH CV ECHO MEAS - TR MAX PG: 27.9 MMHG
BH CV ECHO MEAS - TR MAX VEL: 264 CM/SEC
BH CV ECHO MEASUREMENTS AVERAGE E/E' RATIO: 11.11
BH CV XLRA - RV BASE: 4.4 CM
BH CV XLRA - RV LENGTH: 7.9 CM
BH CV XLRA - RV MID: 3.3 CM
BH CV XLRA - TDI S': 7.2 CM/SEC
BILIRUB SERPL-MCNC: 1 MG/DL (ref 0–1.2)
BUN SERPL-MCNC: 10 MG/DL (ref 6–20)
BUN/CREAT SERPL: 16.1 (ref 7–25)
CALCIUM SPEC-SCNC: 8.3 MG/DL (ref 8.6–10.5)
CHLORIDE SERPL-SCNC: 103 MMOL/L (ref 98–107)
CO2 SERPL-SCNC: 21 MMOL/L (ref 22–29)
CREAT SERPL-MCNC: 0.62 MG/DL (ref 0.76–1.27)
DEPRECATED RDW RBC AUTO: 48.6 FL (ref 37–54)
EGFRCR SERPLBLD CKD-EPI 2021: 110.8 ML/MIN/1.73
EOSINOPHIL # BLD AUTO: 0.03 10*3/MM3 (ref 0–0.4)
EOSINOPHIL NFR BLD AUTO: 0.4 % (ref 0.3–6.2)
ERYTHROCYTE [DISTWIDTH] IN BLOOD BY AUTOMATED COUNT: 12.5 % (ref 12.3–15.4)
GLOBULIN UR ELPH-MCNC: 2.1 GM/DL
GLUCOSE BLDC GLUCOMTR-MCNC: 127 MG/DL (ref 70–130)
GLUCOSE BLDC GLUCOMTR-MCNC: 135 MG/DL (ref 70–130)
GLUCOSE BLDC GLUCOMTR-MCNC: 139 MG/DL (ref 70–130)
GLUCOSE BLDC GLUCOMTR-MCNC: 143 MG/DL (ref 70–130)
GLUCOSE BLDC GLUCOMTR-MCNC: 201 MG/DL (ref 70–130)
GLUCOSE SERPL-MCNC: 119 MG/DL (ref 65–99)
HBA1C MFR BLD: 6.2 % (ref 4.8–5.6)
HCT VFR BLD AUTO: 40.7 % (ref 37.5–51)
HGB BLD-MCNC: 13.5 G/DL (ref 13–17.7)
IMM GRANULOCYTES # BLD AUTO: 0.03 10*3/MM3 (ref 0–0.05)
IMM GRANULOCYTES NFR BLD AUTO: 0.4 % (ref 0–0.5)
LYMPHOCYTES # BLD AUTO: 1.3 10*3/MM3 (ref 0.7–3.1)
LYMPHOCYTES NFR BLD AUTO: 17.7 % (ref 19.6–45.3)
MAGNESIUM SERPL-MCNC: 1.7 MG/DL (ref 1.6–2.6)
MCH RBC QN AUTO: 34.4 PG (ref 26.6–33)
MCHC RBC AUTO-ENTMCNC: 33.2 G/DL (ref 31.5–35.7)
MCV RBC AUTO: 103.6 FL (ref 79–97)
MONOCYTES # BLD AUTO: 1.03 10*3/MM3 (ref 0.1–0.9)
MONOCYTES NFR BLD AUTO: 14.1 % (ref 5–12)
NEUTROPHILS NFR BLD AUTO: 4.92 10*3/MM3 (ref 1.7–7)
NEUTROPHILS NFR BLD AUTO: 67.1 % (ref 42.7–76)
NRBC BLD AUTO-RTO: 0 /100 WBC (ref 0–0.2)
PLATELET # BLD AUTO: 212 10*3/MM3 (ref 140–450)
PMV BLD AUTO: 9.3 FL (ref 6–12)
POTASSIUM SERPL-SCNC: 4.3 MMOL/L (ref 3.5–5.2)
PROT SERPL-MCNC: 5.6 G/DL (ref 6–8.5)
QT INTERVAL: 287 MS
QT INTERVAL: 444 MS
QTC INTERVAL: 508 MS
QTC INTERVAL: 559 MS
RBC # BLD AUTO: 3.93 10*6/MM3 (ref 4.14–5.8)
SINUS: 3.2 CM
SODIUM SERPL-SCNC: 136 MMOL/L (ref 136–145)
STJ: 3.2 CM
TSH SERPL DL<=0.05 MIU/L-ACNC: 1.29 UIU/ML (ref 0.27–4.2)
WBC NRBC COR # BLD AUTO: 7.33 10*3/MM3 (ref 3.4–10.8)

## 2024-04-08 PROCEDURE — 80053 COMPREHEN METABOLIC PANEL: CPT | Performed by: INTERNAL MEDICINE

## 2024-04-08 PROCEDURE — 36415 COLL VENOUS BLD VENIPUNCTURE: CPT | Performed by: INTERNAL MEDICINE

## 2024-04-08 PROCEDURE — 83735 ASSAY OF MAGNESIUM: CPT | Performed by: INTERNAL MEDICINE

## 2024-04-08 PROCEDURE — 82948 REAGENT STRIP/BLOOD GLUCOSE: CPT

## 2024-04-08 PROCEDURE — 93010 ELECTROCARDIOGRAM REPORT: CPT | Performed by: INTERNAL MEDICINE

## 2024-04-08 PROCEDURE — 83036 HEMOGLOBIN GLYCOSYLATED A1C: CPT | Performed by: INTERNAL MEDICINE

## 2024-04-08 PROCEDURE — 93306 TTE W/DOPPLER COMPLETE: CPT

## 2024-04-08 PROCEDURE — 25510000001 PERFLUTREN (DEFINITY) 8.476 MG IN SODIUM CHLORIDE (PF) 0.9 % 10 ML INJECTION

## 2024-04-08 PROCEDURE — 63710000001 INSULIN LISPRO (HUMAN) PER 5 UNITS: Performed by: INTERNAL MEDICINE

## 2024-04-08 PROCEDURE — 93005 ELECTROCARDIOGRAM TRACING: CPT | Performed by: INTERNAL MEDICINE

## 2024-04-08 PROCEDURE — 84443 ASSAY THYROID STIM HORMONE: CPT | Performed by: INTERNAL MEDICINE

## 2024-04-08 PROCEDURE — 93306 TTE W/DOPPLER COMPLETE: CPT | Performed by: INTERNAL MEDICINE

## 2024-04-08 PROCEDURE — 25010000002 CEFTRIAXONE PER 250 MG: Performed by: INTERNAL MEDICINE

## 2024-04-08 PROCEDURE — 99222 1ST HOSP IP/OBS MODERATE 55: CPT

## 2024-04-08 PROCEDURE — 85025 COMPLETE CBC W/AUTO DIFF WBC: CPT | Performed by: INTERNAL MEDICINE

## 2024-04-08 RX ORDER — ATENOLOL 50 MG/1
50 TABLET ORAL EVERY 12 HOURS SCHEDULED
Status: DISCONTINUED | OUTPATIENT
Start: 2024-04-08 | End: 2024-04-10 | Stop reason: HOSPADM

## 2024-04-08 RX ORDER — CHOLECALCIFEROL (VITAMIN D3) 125 MCG
5 CAPSULE ORAL NIGHTLY PRN
Status: DISCONTINUED | OUTPATIENT
Start: 2024-04-08 | End: 2024-04-10 | Stop reason: HOSPADM

## 2024-04-08 RX ORDER — COLCHICINE 0.6 MG/1
0.6 TABLET ORAL EVERY 12 HOURS SCHEDULED
Status: DISCONTINUED | OUTPATIENT
Start: 2024-04-08 | End: 2024-04-10 | Stop reason: HOSPADM

## 2024-04-08 RX ADMIN — APIXABAN 5 MG: 5 TABLET, FILM COATED ORAL at 08:07

## 2024-04-08 RX ADMIN — APIXABAN 5 MG: 5 TABLET, FILM COATED ORAL at 20:40

## 2024-04-08 RX ADMIN — LEVOTHYROXINE SODIUM 150 MCG: 150 TABLET ORAL at 06:55

## 2024-04-08 RX ADMIN — GABAPENTIN 300 MG: 300 CAPSULE ORAL at 20:41

## 2024-04-08 RX ADMIN — CEFTRIAXONE 2000 MG: 2 INJECTION, POWDER, FOR SOLUTION INTRAMUSCULAR; INTRAVENOUS at 17:29

## 2024-04-08 RX ADMIN — Medication 5 MG: at 21:54

## 2024-04-08 RX ADMIN — SODIUM CHLORIDE 15 MG/HR: 900 INJECTION, SOLUTION INTRAVENOUS at 06:56

## 2024-04-08 RX ADMIN — ATENOLOL 50 MG: 50 TABLET ORAL at 20:40

## 2024-04-08 RX ADMIN — DILTIAZEM HYDROCHLORIDE 90 MG: 60 TABLET, FILM COATED ORAL at 21:54

## 2024-04-08 RX ADMIN — PERFLUTREN 2 ML: 6.52 INJECTION, SUSPENSION INTRAVENOUS at 11:23

## 2024-04-08 RX ADMIN — INSULIN LISPRO 4 UNITS: 100 INJECTION, SOLUTION INTRAVENOUS; SUBCUTANEOUS at 20:44

## 2024-04-08 RX ADMIN — Medication 10 ML: at 08:41

## 2024-04-08 RX ADMIN — COLCHICINE 0.6 MG: 0.6 TABLET ORAL at 20:41

## 2024-04-08 RX ADMIN — ATENOLOL 37.5 MG: 25 TABLET ORAL at 06:55

## 2024-04-08 RX ADMIN — DILTIAZEM HYDROCHLORIDE 90 MG: 60 TABLET, FILM COATED ORAL at 14:05

## 2024-04-08 RX ADMIN — COLCHICINE 0.6 MG: 0.6 TABLET ORAL at 13:06

## 2024-04-08 RX ADMIN — GABAPENTIN 300 MG: 300 CAPSULE ORAL at 08:07

## 2024-04-08 RX ADMIN — ATORVASTATIN CALCIUM 10 MG: 20 TABLET, FILM COATED ORAL at 20:40

## 2024-04-08 RX ADMIN — ATENOLOL 37.5 MG: 25 TABLET ORAL at 01:08

## 2024-04-08 RX ADMIN — Medication 10 ML: at 20:49

## 2024-04-08 NOTE — PLAN OF CARE
Goal Outcome Evaluation:  Plan of Care Reviewed With: patient        Progress: improving  Outcome Evaluation: VSS; A&Ox4. Pt received up from ED @ beginning of shift for Afib w RVR. Pt remains Afib @ controlled rate; pt on Eliquis. Pt remains on Cardizem gtt. Pt Qtc prolonged; call to cardiology oncall. Tikosyn held per order over phone; will need to be addressed today. Pt w/o c/o of pain during the night. Pt -141. Pt expresses no additional needs at this time. Plan of care is ongoing.

## 2024-04-08 NOTE — OUTREACH NOTE
Medical Week 3 Survey      Flowsheet Row Responses   [facility] patient discharged from? [place]   Does the patient have one of the following disease processes/diagnoses(primary or secondary)? Other   Week 3 attempt successful? No   Unsuccessful attempts Attempt 1   Revoke Readmitted            Katelynn FLANAGAN - Registered Nurse

## 2024-04-08 NOTE — PLAN OF CARE
Goal Outcome Evaluation:              Outcome Evaluation: Pt A&Ox4, VSS, no c/o pain. Pt still in cont Afib. Pt has incision from chest tube on left side that is leaking fluid, Surgery is going to see him sheri. Pt is up ad ady to radha. Will update POC as needed.
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
## 2024-04-08 NOTE — CASE MANAGEMENT/SOCIAL WORK
Discharge Planning Assessment  [facility]     Patient Name: Marcus Sanchez  MRN: [id number]  Today's Date: 4/8/2024    Admit Date: 4/7/2024    Plan: Home; Denies needs.   Discharge Needs Assessment       Row Name 04/08/24 1338       Living Environment    People in Home spouse    Name(s) of People in Home ADRIÁN SANCHEZ    Current Living Arrangements home    Potentially Unsafe Housing Conditions none    In the past 12 months has the electric, gas, oil, or water company threatened to shut off services in your home? No    Primary Care Provided by self    Provides Primary Care For pet(s)  5 DOGS    Family Caregiver if Needed spouse    Family Caregiver Names ADRIÁN/WIFE    Quality of Family Relationships supportive    Able to Return to Prior Arrangements yes       Resource/Environmental Concerns    Resource/Environmental Concerns none    Transportation Concerns none       Transportation Needs    In the past 12 months, has lack of transportation kept you from medical appointments or from getting medications? no    In the past 12 months, has lack of transportation kept you from meetings, work, or from getting things needed for daily living? No       Food Insecurity    Within the past 12 months, you worried that your food would run out before you got the money to buy more. Never true    Within the past 12 months, the food you bought just didn't last and you didn't have money to get more. Never true       Transition Planning    Patient/Family Anticipates Transition to home with family    Patient/Family Anticipated Services at Transition none    Transportation Anticipated family or friend will provide       Discharge Needs Assessment    Readmission Within the Last 30 Days previous discharge plan unsuccessful    Equipment Currently Used at Home cane, straight;pulse ox;bp cuff    Concerns to be Addressed no discharge needs identified;denies needs/concerns at this time    Anticipated Changes Related to Illness none    Equipment  "Needed After Discharge none    Provided Post Acute Provider List? Refused    Refused Provider List Comment Pt declined need for list                   Discharge Plan       Row Name 04/08/24 1340       Plan    Plan Home; Denies needs.    Plan Comments CCP spoke with pleasant patient at bedside. CCP role explained and discharge planning discussed. Face sheet verified. Patient stated he is IADL's, works full-time and drives. Patient lives with spouse/Yadi Givens in a single-story home with three entrance stair steps from front [facility]. Patients PCP confirmed as, Jackson Vázquez. Patient's pharmacy confirmed as, [facility]. Patient denies use of past home health or going to a sub-acute rehab. Patient has the following DME- BP CUFF, straight cane and pulse oximeter.  Patient reports he plans to return home at discharge and a friend will, \"most likely\", transport him home. Patient denies current discharge needs. CCP will continue to follow…….Magalis PUCKETT /JEZ.      Row Name 04/08/24 1237       Plan    Plan Home w/ spouse                  Continued Care and Services - Admitted Since 4/7/2024    No active coordination exists for this encounter.       Expected Discharge Date and Time       Expected Discharge Date Expected Discharge Time    Apr 9, 2024            Demographic Summary       Row Name 04/08/24 1337       General Information    Admission Type inpatient    Arrived From emergency department    Required Notices Provided Important Message from Medicare    Referral Source admission list    Reason for Consult discharge planning    Preferred Language English       Contact Information    Permission Granted to Share Info With family/designee                   Functional Status       Row Name 04/08/24 1338       Functional Status    Usual Activity Tolerance good    Current Activity Tolerance moderate       Assessment of Health Literacy    How often do you have someone help you read hospital materials? Never    Health " Literacy Good       Functional Status, IADL    Medications independent    Meal Preparation independent    Housekeeping independent    Laundry independent    Shopping independent       Mental Status    General Appearance WDL WDL       Mental Status Summary    Recent Changes in Mental Status/Cognitive Functioning no changes       Employment/    Employment Status employed full-time                   Psychosocial    No documentation.                  Abuse/Neglect    No documentation.                  Legal    No documentation.                  Substance Abuse    No documentation.                  Patient Forms    No documentation.                     Magalis Ledesma RN

## 2024-04-08 NOTE — PROGRESS NOTES
Name: Marcus Simon ADMIT: 2024   : 1965  PCP: Jackson Vázquez MD    MRN: [id number] LOS: 1 days   AGE/SEX: 59 y.o. male  ROOM: [facility]     Subjective   Subjective   Patient seen and examined this morning.  Continues to have dyspnea and pleuritic type pain, however, improved from prior.       Objective   Objective   Vital Signs  Temp:  [97.7 °F (36.5 °C)-101 °F (38.3 °C)] 98.4 °F (36.9 °C)  Heart Rate:  [] 116  Resp:  [16-20] 18  BP: (119-160)/() 136/97  SpO2:  [89 %-96 %] 93 %  on  Flow (L/min):  [2] 2;   Device (Oxygen Therapy): nasal cannula  Body mass index is 42.97 kg/m².  Physical Exam  Vitals and nursing note reviewed.   Constitutional:       General: He is awake. He is not in acute distress.     Appearance: He is obese. He is ill-appearing.   Eyes:      General: No scleral icterus.  Cardiovascular:      Rate and Rhythm: Regular rhythm. Tachycardia present.      Pulses: Normal pulses.      Heart sounds: Normal heart sounds.   Pulmonary:      Effort: Pulmonary effort is normal. No respiratory distress.      Breath sounds: Decreased breath sounds present.      Comments: On supplemental oxygen  Abdominal:      Palpations: Abdomen is soft.      Tenderness: There is no abdominal tenderness.   Musculoskeletal:      Comments: Trace bilateral lower extremity edema   Skin:     General: Skin is warm and dry.   Neurological:      Mental Status: He is alert.   Psychiatric:         Behavior: Behavior is cooperative.       Results Review     I reviewed the patient's new clinical results.  Results from last 7 days   Lab Units 24  0437 24  1549   WBC 10*3/mm3 7.33 9.99   HEMOGLOBIN g/dL 13.5 15.2   PLATELETS 10*3/mm3 212 233     Results from last 7 days   Lab Units 24  0437 24  1549   SODIUM mmol/L 136 140   POTASSIUM mmol/L 4.3 4.7   CHLORIDE mmol/L 103 102   CO2 mmol/L 21.0* 26.0   BUN mg/dL 10 11   CREATININE mg/dL 0.62* 0.72*   GLUCOSE mg/dL 119* 170*   EGFR mL/min/1.73  110.8 105.9     Results from last 7 days   Lab Units 04/08/24  0437 04/07/24  1549   ALBUMIN g/dL 3.5 4.2   BILIRUBIN mg/dL 1.0 0.9   ALK PHOS U/L 98 124*   AST (SGOT) U/L 13 15   ALT (SGPT) U/L 19 25     Results from last 7 days   Lab Units 04/08/24  0437 04/07/24  1549   CALCIUM mg/dL 8.3* 9.1   ALBUMIN g/dL 3.5 4.2   MAGNESIUM mg/dL 1.7  --      Results from last 7 days   Lab Units 04/07/24  2016 04/07/24  1827 04/07/24  1635   PROCALCITONIN ng/mL  --  0.04  --    LACTATE mmol/L 1.4  --  2.3*     Hemoglobin A1C   Date/Time Value Ref Range Status   04/08/2024 0437 6.20 (H) 4.80 - 5.60 % Final     Glucose   Date/Time Value Ref Range Status   04/08/2024 1230 143 (H) 70 - 130 mg/dL Final   04/08/2024 0608 127 70 - 130 mg/dL Final   04/08/2024 0150 139 (H) 70 - 130 mg/dL Final       CT Angiogram Chest Pulmonary Embolism    Result Date: 4/7/2024  1. No evidence for pulmonary thromboembolic disease. 2. New small pericardial effusion. This may be related to recent surgery. Pericarditis could be considered in the proper clinical setting. 3. New 15 x 10 mm right perihilar nodule within the right upper lobe is favored to be inflammatory or infectious in etiology. Recommend follow-up to resolution. 4. No change in 12 m left lower lobe nodule and additional sub-5 mm nodules when compared to the examination approximately 2 months ago. Continued CT surveillance recommended. 5. New small left greater than right pleural effusions.  Radiation dose reduction techniques were utilized, including automated exposure control and exposure modulation based on body size.   This report was finalized on 4/7/2024 6:12 PM by Dr. Harsh Barraza M.D on Workstation: [id number]      XR Chest 1 View    Result Date: 4/7/2024  Cardiomegaly. No evidence for active disease in the chest.  This report was finalized on 4/7/2024 4:41 PM by Dr. Harsh Barraza M.D on Workstation: [id number]       I have personally reviewed all medications:  Scheduled  Medications  apixaban, 5 mg, Oral, BID  atenolol, 50 mg, Oral, Q12H  atorvastatin, 10 mg, Oral, Nightly  cefTRIAXone, 2,000 mg, Intravenous, Q24H  colchicine, 0.6 mg, Oral, Q12H  dilTIAZem, 90 mg, Oral, Q8H  gabapentin, 300 mg, Oral, BID  insulin lispro, 2-9 Units, Subcutaneous, 4x Daily AC & at Bedtime  levothyroxine, 150 mcg, Oral, Q AM  sodium chloride, 10 mL, Intravenous, Q12H    Infusions   Diet  Diet: Cardiac, Diabetic; Healthy Heart (2-3 Na+); Consistent Carbohydrate; Fluid Consistency: Thin (IDDSI 0)    I have personally reviewed:  [x]  Laboratory   [x]  Microbiology   [x]  Radiology   [x]  EKG/Telemetry  [x]  Cardiology/Vascular   []  Pathology    []  Records       Assessment/Plan     Active Hospital Problems    Diagnosis  POA    **Atrial fibrillation with RVR [I48.91]  Yes    Chronic systolic heart failure [I50.22]  Yes    Hypertension [I10]  Yes    Hyperlipidemia [E78.5]  Yes    Hypothyroidism [E03.9]  Yes      Resolved Hospital Problems   No resolved problems to display.       59 y.o. male admitted with Atrial fibrillation with RVR.    A-fib with RVR: Cardiology following, going to transition to PO Diltiazem, continue Atenolol. Hold Tikosyn. Ordering 2D Echocardiogram.  Dyspnea/Pericardial effusion/Pleural effusions: Cardiology suspect Pericarditis, starting Colchicine and ordering echo as above.  SIRS: Patient has fever and tachycardia.  He has been given Rocephin and cultures have been drawn.  Procalcitonin is not elevated.  RVP negative. Plan to continue the Rocephin and will monitor culture results.  Lactic acid level was elevated so Lasix has been held and he did receive fluids.  Chronic systolic heart failure: No evidence of acute exacerbation at present. Can monitor for now. Can resume Lasix when okay with Cardiology.   Prediabetes: A1c 6.20% consistent with diagnosis. Monitor for now, SSI as needed.  Pulmonary nodules: Noted on imaging, new 15 x 10 mm right perihilar nodule within the right upper  lobe is favored to be inflammatory or infectious in etiology, unchanged 12 m left lower lobe nodule and additional sub-5 mm nodules when compared to the examination approximately 2 months ago. Will need repeat outpatient CT scan for ongoing monitoring.  Hypertension: BP acceptable, continue to monitor.  HLD: Statin  Hypothyroidism: Synthroid.      Eliquis (home med) for DVT prophylaxis.  Full code.  Discussed with patient, nursing staff, and consulting provider.  Anticipate discharge home tomorrow.  Expected discharge date/ time has not been documented.      Wilfred Fernandes DO  [facility]  04/08/24

## 2024-04-08 NOTE — PROGRESS NOTES
Was notified that repeat EKG shows continued prolongation of QTc.  Continue to hold Tikosyn and will have patient seen by EP in the morning.
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
## 2024-04-09 LAB
ANION GAP SERPL CALCULATED.3IONS-SCNC: 7.9 MMOL/L (ref 5–15)
BASOPHILS # BLD AUTO: 0.02 10*3/MM3 (ref 0–0.2)
BASOPHILS NFR BLD AUTO: 0.4 % (ref 0–1.5)
BUN SERPL-MCNC: 11 MG/DL (ref 6–20)
BUN/CREAT SERPL: 19 (ref 7–25)
CALCIUM SPEC-SCNC: 8.7 MG/DL (ref 8.6–10.5)
CHLORIDE SERPL-SCNC: 106 MMOL/L (ref 98–107)
CO2 SERPL-SCNC: 25.1 MMOL/L (ref 22–29)
CREAT SERPL-MCNC: 0.58 MG/DL (ref 0.76–1.27)
DEPRECATED RDW RBC AUTO: 44.1 FL (ref 37–54)
EGFRCR SERPLBLD CKD-EPI 2021: 113 ML/MIN/1.73
EOSINOPHIL # BLD AUTO: 0.15 10*3/MM3 (ref 0–0.4)
EOSINOPHIL NFR BLD AUTO: 2.7 % (ref 0.3–6.2)
ERYTHROCYTE [DISTWIDTH] IN BLOOD BY AUTOMATED COUNT: 11.9 % (ref 12.3–15.4)
GLUCOSE BLDC GLUCOMTR-MCNC: 121 MG/DL (ref 70–130)
GLUCOSE BLDC GLUCOMTR-MCNC: 124 MG/DL (ref 70–130)
GLUCOSE BLDC GLUCOMTR-MCNC: 146 MG/DL (ref 70–130)
GLUCOSE SERPL-MCNC: 109 MG/DL (ref 65–99)
HCT VFR BLD AUTO: 38.2 % (ref 37.5–51)
HGB BLD-MCNC: 12.8 G/DL (ref 13–17.7)
IMM GRANULOCYTES # BLD AUTO: 0.01 10*3/MM3 (ref 0–0.05)
IMM GRANULOCYTES NFR BLD AUTO: 0.2 % (ref 0–0.5)
LYMPHOCYTES # BLD AUTO: 1.23 10*3/MM3 (ref 0.7–3.1)
LYMPHOCYTES NFR BLD AUTO: 22.4 % (ref 19.6–45.3)
MCH RBC QN AUTO: 33.7 PG (ref 26.6–33)
MCHC RBC AUTO-ENTMCNC: 33.5 G/DL (ref 31.5–35.7)
MCV RBC AUTO: 100.5 FL (ref 79–97)
MONOCYTES # BLD AUTO: 0.63 10*3/MM3 (ref 0.1–0.9)
MONOCYTES NFR BLD AUTO: 11.5 % (ref 5–12)
NEUTROPHILS NFR BLD AUTO: 3.44 10*3/MM3 (ref 1.7–7)
NEUTROPHILS NFR BLD AUTO: 62.8 % (ref 42.7–76)
NRBC BLD AUTO-RTO: 0 /100 WBC (ref 0–0.2)
PLATELET # BLD AUTO: 180 10*3/MM3 (ref 140–450)
PMV BLD AUTO: 9.4 FL (ref 6–12)
POTASSIUM SERPL-SCNC: 4.2 MMOL/L (ref 3.5–5.2)
RBC # BLD AUTO: 3.8 10*6/MM3 (ref 4.14–5.8)
SODIUM SERPL-SCNC: 139 MMOL/L (ref 136–145)
WBC NRBC COR # BLD AUTO: 5.48 10*3/MM3 (ref 3.4–10.8)

## 2024-04-09 PROCEDURE — 85025 COMPLETE CBC W/AUTO DIFF WBC: CPT | Performed by: STUDENT IN AN ORGANIZED HEALTH CARE EDUCATION/TRAINING PROGRAM

## 2024-04-09 PROCEDURE — 99232 SBSQ HOSP IP/OBS MODERATE 35: CPT

## 2024-04-09 PROCEDURE — 25010000002 CEFTRIAXONE PER 250 MG: Performed by: INTERNAL MEDICINE

## 2024-04-09 PROCEDURE — 82948 REAGENT STRIP/BLOOD GLUCOSE: CPT

## 2024-04-09 PROCEDURE — 25010000002 BUMETANIDE PER 0.5 MG

## 2024-04-09 PROCEDURE — 80048 BASIC METABOLIC PNL TOTAL CA: CPT | Performed by: STUDENT IN AN ORGANIZED HEALTH CARE EDUCATION/TRAINING PROGRAM

## 2024-04-09 RX ORDER — HYDROXYZINE 50 MG/1
50 TABLET, FILM COATED ORAL ONCE
Status: COMPLETED | OUTPATIENT
Start: 2024-04-09 | End: 2024-04-09

## 2024-04-09 RX ORDER — BUMETANIDE 0.25 MG/ML
2 INJECTION INTRAMUSCULAR; INTRAVENOUS ONCE
Status: COMPLETED | OUTPATIENT
Start: 2024-04-09 | End: 2024-04-09

## 2024-04-09 RX ORDER — POTASSIUM CHLORIDE 750 MG/1
20 TABLET, FILM COATED, EXTENDED RELEASE ORAL ONCE
Status: COMPLETED | OUTPATIENT
Start: 2024-04-09 | End: 2024-04-09

## 2024-04-09 RX ORDER — FUROSEMIDE 20 MG/1
20 TABLET ORAL
Status: DISCONTINUED | OUTPATIENT
Start: 2024-04-10 | End: 2024-04-10 | Stop reason: HOSPADM

## 2024-04-09 RX ADMIN — APIXABAN 5 MG: 5 TABLET, FILM COATED ORAL at 21:26

## 2024-04-09 RX ADMIN — Medication 10 ML: at 09:34

## 2024-04-09 RX ADMIN — DILTIAZEM HYDROCHLORIDE 90 MG: 60 TABLET, FILM COATED ORAL at 06:06

## 2024-04-09 RX ADMIN — GABAPENTIN 300 MG: 300 CAPSULE ORAL at 21:26

## 2024-04-09 RX ADMIN — HYDROXYZINE HYDROCHLORIDE 50 MG: 50 TABLET ORAL at 01:20

## 2024-04-09 RX ADMIN — COLCHICINE 0.6 MG: 0.6 TABLET ORAL at 09:33

## 2024-04-09 RX ADMIN — Medication 10 ML: at 21:27

## 2024-04-09 RX ADMIN — DILTIAZEM HYDROCHLORIDE 90 MG: 60 TABLET, FILM COATED ORAL at 21:26

## 2024-04-09 RX ADMIN — COLCHICINE 0.6 MG: 0.6 TABLET ORAL at 21:25

## 2024-04-09 RX ADMIN — APIXABAN 5 MG: 5 TABLET, FILM COATED ORAL at 09:33

## 2024-04-09 RX ADMIN — ATENOLOL 50 MG: 50 TABLET ORAL at 21:25

## 2024-04-09 RX ADMIN — BUMETANIDE 2 MG: 0.25 INJECTION INTRAMUSCULAR; INTRAVENOUS at 13:15

## 2024-04-09 RX ADMIN — DILTIAZEM HYDROCHLORIDE 90 MG: 60 TABLET, FILM COATED ORAL at 13:18

## 2024-04-09 RX ADMIN — POTASSIUM CHLORIDE 20 MEQ: 750 TABLET, EXTENDED RELEASE ORAL at 13:07

## 2024-04-09 RX ADMIN — ATENOLOL 50 MG: 50 TABLET ORAL at 09:33

## 2024-04-09 RX ADMIN — Medication 5 MG: at 22:47

## 2024-04-09 RX ADMIN — CEFTRIAXONE 2000 MG: 2 INJECTION, POWDER, FOR SOLUTION INTRAMUSCULAR; INTRAVENOUS at 18:11

## 2024-04-09 RX ADMIN — LEVOTHYROXINE SODIUM 150 MCG: 150 TABLET ORAL at 06:06

## 2024-04-09 RX ADMIN — GABAPENTIN 300 MG: 300 CAPSULE ORAL at 09:33

## 2024-04-09 RX ADMIN — ATORVASTATIN CALCIUM 10 MG: 20 TABLET, FILM COATED ORAL at 21:26

## 2024-04-09 NOTE — SIGNIFICANT NOTE
04/09/24 0754   OTHER   Discipline occupational therapist   Rehab Time/Intention   Session Not Performed other (see comments)  (Pt is reported to be up independently, [facility] score 24. No skilled OT needs anticipated at this time.)

## 2024-04-09 NOTE — SIGNIFICANT NOTE
04/09/24 0657   OTHER   Discipline physical therapist   Therapy Assessment/Plan (PT)   Criteria for Skilled Interventions Met (PT) no problems identified which require skilled intervention  (Nsg doc pt is up indep with [facility] score of 24.  No indication for acute PT necessary.  If status changes, please reorder.)

## 2024-04-09 NOTE — PROGRESS NOTES
Name: Marcus Simon ADMIT: 2024   : 1965  PCP: Jackson Vázquez MD    MRN: [id number] LOS: 2 days   AGE/SEX: 59 y.o. male  [facility]     Subjective   Subjective   Patient seen and examined this morning.  Resting in bed, having some dyspnea. Rate controlled.      Objective   Objective   Vital Signs  Temp:  [98.1 °F (36.7 °C)-98.6 °F (37 °C)] 98.1 °F (36.7 °C)  Heart Rate:  [75-92] 88  Resp:  [18] 18  BP: (115-147)/(66-90) 134/86  SpO2:  [93 %-98 %] 96 %  on  Flow (L/min):  [1] 1;   Device (Oxygen Therapy): nasal cannula  Body mass index is 42.97 kg/m².  Physical Exam  Vitals and nursing note reviewed.   Constitutional:       General: He is awake. He is not in acute distress.     Appearance: He is obese. He is ill-appearing.   Eyes:      General: No scleral icterus.  Cardiovascular:      Rate and Rhythm: Normal rate and regular rhythm.      Pulses: Normal pulses.      Heart sounds: Normal heart sounds.   Pulmonary:      Effort: Pulmonary effort is normal. No respiratory distress.      Breath sounds: Decreased breath sounds present.      Comments: On supplemental oxygen  Abdominal:      Palpations: Abdomen is soft.      Tenderness: There is no abdominal tenderness.   Musculoskeletal:      Comments: Prior left sided chest tube site with drainage, Trace bilateral lower extremity edema   Skin:     General: Skin is warm and dry.   Neurological:      Mental Status: He is alert.   Psychiatric:         Behavior: Behavior is cooperative.       Results Review     I reviewed the patient's new clinical results.  Results from last 7 days   Lab Units 24  0402 24  04324  1549   WBC 10*3/mm3 5.48 7.33 9.99   HEMOGLOBIN g/dL 12.8* 13.5 15.2   PLATELETS 10*3/mm3 180 212 233     Results from last 7 days   Lab Units 24  0402 24  0437 24  1549   SODIUM mmol/L 139 136 140   POTASSIUM mmol/L 4.2 4.3 4.7   CHLORIDE mmol/L 106 103 102   CO2 mmol/L 25.1 21.0* 26.0   BUN mg/dL 11 10 11    CREATININE mg/dL 0.58* 0.62* 0.72*   GLUCOSE mg/dL 109* 119* 170*   EGFR mL/min/1.73 113.0 110.8 105.9     Results from last 7 days   Lab Units 04/08/24  0437 04/07/24  1549   ALBUMIN g/dL 3.5 4.2   BILIRUBIN mg/dL 1.0 0.9   ALK PHOS U/L 98 124*   AST (SGOT) U/L 13 15   ALT (SGPT) U/L 19 25     Results from last 7 days   Lab Units 04/09/24  0402 04/08/24  0437 04/07/24  1549   CALCIUM mg/dL 8.7 8.3* 9.1   ALBUMIN g/dL  --  3.5 4.2   MAGNESIUM mg/dL  --  1.7  --      Results from last 7 days   Lab Units 04/07/24 2016 04/07/24  1827 04/07/24  1635   PROCALCITONIN ng/mL  --  0.04  --    LACTATE mmol/L 1.4  --  2.3*     Hemoglobin A1C   Date/Time Value Ref Range Status   04/08/2024 0437 6.20 (H) 4.80 - 5.60 % Final     Glucose   Date/Time Value Ref Range Status   04/09/2024 1053 146 (H) 70 - 130 mg/dL Final   04/09/2024 0647 124 70 - 130 mg/dL Final   04/08/2024 2028 201 (H) 70 - 130 mg/dL Final   04/08/2024 1545 135 (H) 70 - 130 mg/dL Final   04/08/2024 1230 143 (H) 70 - 130 mg/dL Final   04/08/2024 0608 127 70 - 130 mg/dL Final   04/08/2024 0150 139 (H) 70 - 130 mg/dL Final       CT Angiogram Chest Pulmonary Embolism    Result Date: 4/7/2024  1. No evidence for pulmonary thromboembolic disease. 2. New small pericardial effusion. This may be related to recent surgery. Pericarditis could be considered in the proper clinical setting. 3. New 15 x 10 mm right perihilar nodule within the right upper lobe is favored to be inflammatory or infectious in etiology. Recommend follow-up to resolution. 4. No change in 12 m left lower lobe nodule and additional sub-5 mm nodules when compared to the examination approximately 2 months ago. Continued CT surveillance recommended. 5. New small left greater than right pleural effusions.  Radiation dose reduction techniques were utilized, including automated exposure control and exposure modulation based on body size.   This report was finalized on 4/7/2024 6:12 PM by Dr. House  ROSARIO Barraza on Workstation: [id number]      XR Chest 1 View    Result Date: 4/7/2024  Cardiomegaly. No evidence for active disease in the chest.  This report was finalized on 4/7/2024 4:41 PM by Dr. Harsh Barraza M.D on Workstation: [id number]       I have personally reviewed all medications:  Scheduled Medications  apixaban, 5 mg, Oral, BID  atenolol, 50 mg, Oral, Q12H  atorvastatin, 10 mg, Oral, Nightly  cefTRIAXone, 2,000 mg, Intravenous, Q24H  colchicine, 0.6 mg, Oral, Q12H  dilTIAZem, 90 mg, Oral, Q8H  gabapentin, 300 mg, Oral, BID  insulin lispro, 2-9 Units, Subcutaneous, 4x Daily AC & at Bedtime  levothyroxine, 150 mcg, Oral, Q AM  sodium chloride, 10 mL, Intravenous, Q12H    Infusions   Diet  Diet: Cardiac, Diabetic; Healthy Heart (2-3 Na+); Consistent Carbohydrate; Fluid Consistency: Thin (IDDSI 0)    I have personally reviewed:  [x]  Laboratory   [x]  Microbiology   [x]  Radiology   [x]  EKG/Telemetry  [x]  Cardiology/Vascular   []  Pathology    []  Records       Assessment/Plan     Active Hospital Problems    Diagnosis  POA    **Atrial fibrillation with RVR [I48.91]  Yes    Chronic systolic heart failure [I50.22]  Yes    Hypertension [I10]  Yes    Hyperlipidemia [E78.5]  Yes    Hypothyroidism [E03.9]  Yes      Resolved Hospital Problems   No resolved problems to display.       59 y.o. male admitted with Atrial fibrillation with RVR.    A-fib with RVR: Cardiology following, patient on PO Diltiazem, Atenolol and Eliquis. Holding Tikosyn. Continue as prescribed.   Dyspnea/Pericardial effusion/Pleural effusions/other fluid overload: Cardiology suspect Pericarditis, started colchicine and ordered 2D echocardiogram which showed stable findings from prior echo. Discussed with Cardiology, recommending continuation of colchicine at current dose at discharge for 2 weeks and they plan to further address at follow up appointment. CT surgery consulted for drainage from prior chest tube site, they ordered x1  dose of IV Lasix due to fluid overload, home diuretic initially held due to concern for possible infection, but now hemodynamically improved. Will restart home furosemide dose in AM. Follow CT surgery plans/recommendations.  SIRS: Patient had fever and tachycardia on arrival.  He has been given Rocephin.  Procalcitonin is not elevated.  RVP negative. Blood cultures no growth thus far. Will monitor x1 more day, can likely d/c antibiotics tomorrow if no further fever and remains hemodynamically stable.   Chronic systolic heart failure: CT surgery ordering x1 IV diuretic now, plans to restart home Furosemide in AM. Closely monitor volume status. Strict I/O, daily weights, telemetry, pulse oximetry, low sodium diet.  Prediabetes: A1c 6.20% consistent with diagnosis. Monitor for now, SSI as needed.  Pulmonary nodules: Noted on imaging, new 15 x 10 mm right perihilar nodule within the right upper lobe is favored to be inflammatory or infectious in etiology, unchanged 12 m left lower lobe nodule and additional sub-5 mm nodules when compared to the examination approximately 2 months ago. Will need repeat outpatient CT scan for ongoing monitoring. Will plan for pulmonology referral at discharge.  Hypertension: BP acceptable, continue to monitor.  HLD: Statin  Hypothyroidism: Synthroid.      Eliquis (home med) for DVT prophylaxis.  Full code.  Discussed with patient, nursing staff, and consulting provider.  Anticipate discharge home tomorrow.  Expected Discharge Date: 4/9/2024; Expected Discharge Time:       Wilfred Fernandes DO  [facility]  04/09/24

## 2024-04-09 NOTE — PROGRESS NOTES
Electrophysiology Follow-Up Note      Patient Name: Marcus Simon  Age/Sex: 59 y.o. male  : 1965  MRN: [id number]      Day of Service: 24       Chief Complaint/Follow-up: persistent AF--s/p hybrid ablation, tachycardia, fever      S: having some coughing spells, CT incision with moderate drainage. No further fevers. HR is better controlled.      Temp:  [98.2 °F (36.8 °C)-98.6 °F (37 °C)] 98.5 °F (36.9 °C)  Heart Rate:  [] 84  Resp:  [18] 18  BP: (115-147)/(66-97) 115/66     PHYSICAL EXAM:    General Appearance: No acute distress, well developed and well nourished.   Eyes: Conjunctiva and lids: No erythema, swelling, or discharge. Sclera non-icteric.   HENT: Atraumatic, normocephalic. External eyes, ears, and nose normal.   Respiratory: No signs of respiratory distress. Respiration rhythm and depth normal.   Clear to auscultation. No rales, crackles, rhonchi, or wheezing auscultated.   Cardiovascular:  Heart Rate and Rhythm: Normal, Heart Sounds: Normal S1 and S2. No S3 or S4 noted.  Gastrointestinal:  Abdomen soft, non-distended, non-tender.  Musculoskeletal: Normal movement of extremities  Skin: Warm and dry.   Psychiatric: Patient alert and oriented to person, place, and time. Speech and behavior appropriate. Normal mood and affect.        Results from last 7 days   Lab Units 24  0402 24  04324  1549   SODIUM mmol/L 139 136 140   POTASSIUM mmol/L 4.2 4.3 4.7   CHLORIDE mmol/L 106 103 102   CO2 mmol/L 25.1 21.0* 26.0   BUN mg/dL 11 10 11   CREATININE mg/dL 0.58* 0.62* 0.72*   GLUCOSE mg/dL 109* 119* 170*   CALCIUM mg/dL 8.7 8.3* 9.1     Results from last 7 days   Lab Units 24  04024  04324  1549   WBC 10*3/mm3 5.48 7.33 9.99   HEMOGLOBIN g/dL 12.8* 13.5 15.2   HEMATOCRIT % 38.2 40.7 45.5   PLATELETS 10*3/mm3 180 212 233         Results from last 7 days   Lab Units 24  1827 24  1549   HSTROP T ng/L 36* 35*     Results from last 7  days   Lab Units 04/08/24  0437   TSH uIU/mL 1.290           Current Medications:   Scheduled Meds:apixaban, 5 mg, Oral, BID  atenolol, 50 mg, Oral, Q12H  atorvastatin, 10 mg, Oral, Nightly  cefTRIAXone, 2,000 mg, Intravenous, Q24H  colchicine, 0.6 mg, Oral, Q12H  dilTIAZem, 90 mg, Oral, Q8H  gabapentin, 300 mg, Oral, BID  insulin lispro, 2-9 Units, Subcutaneous, 4x Daily AC & at Bedtime  levothyroxine, 150 mcg, Oral, Q AM  sodium chloride, 10 mL, Intravenous, Q12H            Atrial fibrillation with RVR    Hypertension    Hypothyroidism    Hyperlipidemia    Chronic systolic heart failure       Plan:   AF with RVR--- changed diltiazem to 90 every 8 hours, switch to 180 mg long acting diltiazem BID at discharge. Continue atenolol. Continue apixaban.     2. Fever--- no further fever, WBC normal.    3. Pericardial effusion--- echo showed no evidence of effusion. Still having some coughing spells.           ---Diagnostic testing so far unrevealing. Will continue colchicine for percarditis.     ---Will have CT surgery assess chest tube site and drainage, if no concern, then okay for discharge.       JOESPH Mai  04/09/24  09:06 EDT

## 2024-04-09 NOTE — H&P (VIEW-ONLY)
Electrophysiology Follow-Up Note      Patient Name: Marcus Simon  Age/Sex: 59 y.o. male  : 1965  MRN: [id number]      Day of Service: 24       Chief Complaint/Follow-up: persistent AF--s/p hybrid ablation, tachycardia, fever      S: having some coughing spells, CT incision with moderate drainage. No further fevers. HR is better controlled.      Temp:  [98.2 °F (36.8 °C)-98.6 °F (37 °C)] 98.5 °F (36.9 °C)  Heart Rate:  [] 84  Resp:  [18] 18  BP: (115-147)/(66-97) 115/66     PHYSICAL EXAM:    General Appearance: No acute distress, well developed and well nourished.   Eyes: Conjunctiva and lids: No erythema, swelling, or discharge. Sclera non-icteric.   HENT: Atraumatic, normocephalic. External eyes, ears, and nose normal.   Respiratory: No signs of respiratory distress. Respiration rhythm and depth normal.   Clear to auscultation. No rales, crackles, rhonchi, or wheezing auscultated.   Cardiovascular:  Heart Rate and Rhythm: Normal, Heart Sounds: Normal S1 and S2. No S3 or S4 noted.  Gastrointestinal:  Abdomen soft, non-distended, non-tender.  Musculoskeletal: Normal movement of extremities  Skin: Warm and dry.   Psychiatric: Patient alert and oriented to person, place, and time. Speech and behavior appropriate. Normal mood and affect.        Results from last 7 days   Lab Units 24  0402 24  04324  1549   SODIUM mmol/L 139 136 140   POTASSIUM mmol/L 4.2 4.3 4.7   CHLORIDE mmol/L 106 103 102   CO2 mmol/L 25.1 21.0* 26.0   BUN mg/dL 11 10 11   CREATININE mg/dL 0.58* 0.62* 0.72*   GLUCOSE mg/dL 109* 119* 170*   CALCIUM mg/dL 8.7 8.3* 9.1     Results from last 7 days   Lab Units 24  04024  04324  1549   WBC 10*3/mm3 5.48 7.33 9.99   HEMOGLOBIN g/dL 12.8* 13.5 15.2   HEMATOCRIT % 38.2 40.7 45.5   PLATELETS 10*3/mm3 180 212 233         Results from last 7 days   Lab Units 24  1827 24  1549   HSTROP T ng/L 36* 35*     Results from last 7  days   Lab Units 04/08/24  0437   TSH uIU/mL 1.290           Current Medications:   Scheduled Meds:apixaban, 5 mg, Oral, BID  atenolol, 50 mg, Oral, Q12H  atorvastatin, 10 mg, Oral, Nightly  cefTRIAXone, 2,000 mg, Intravenous, Q24H  colchicine, 0.6 mg, Oral, Q12H  dilTIAZem, 90 mg, Oral, Q8H  gabapentin, 300 mg, Oral, BID  insulin lispro, 2-9 Units, Subcutaneous, 4x Daily AC & at Bedtime  levothyroxine, 150 mcg, Oral, Q AM  sodium chloride, 10 mL, Intravenous, Q12H            Atrial fibrillation with RVR    Hypertension    Hypothyroidism    Hyperlipidemia    Chronic systolic heart failure       Plan:   AF with RVR--- changed diltiazem to 90 every 8 hours, switch to 180 mg long acting diltiazem BID at discharge. Continue atenolol. Continue apixaban.     2. Fever--- no further fever, WBC normal.    3. Pericardial effusion--- echo showed no evidence of effusion. Still having some coughing spells.           ---Diagnostic testing so far unrevealing. Will continue colchicine for percarditis.     ---Will have CT surgery assess chest tube site and drainage, if no concern, then okay for discharge.       JOESPH Mai  04/09/24  09:06 EDT

## 2024-04-09 NOTE — PROGRESS NOTES
" LOS: 2 days   Patient Care Team:  Jackson Vázquez MD as PCP - General  Jackson Vázquez MD as PCP - Family Medicine  Ron Cowan MD as Consulting Physician (Cardiology)    Chief Complaint: Drainage from chest tube site     Subjective     Patient is s/p convergent procedure/ANA occlusion with Dr. Clifton on 3/21. Patient was discharged on 3/24. Patient presented to the ED on 4/7 with complaints of palpitations, left sided chest/rib pain, and drainage from left sided chest tube site. Patient is on Eliquis at home and is followed by EP.     Objective     Vital Signs  Temp:  [98.2 °F (36.8 °C)-98.6 °F (37 °C)] 98.5 °F (36.9 °C)  Heart Rate:  [] 84  Resp:  [18] 18  BP: (115-147)/(66-97) 115/66  Body mass index is 42.97 kg/m².    Intake/Output Summary (Last 24 hours) at 4/9/2024 0844  Last data filed at 4/9/2024 0831  Gross per 24 hour   Intake 240 ml   Output 1450 ml   Net -1210 ml     I/O this shift:  In: 240 [P.O.:240]  Out: 350 [Urine:350]      Wt Readings from Last 3 Encounters:   04/08/24 (!) 160 kg (353 lb)   04/04/24 (!) 160 kg (352 lb)   03/24/24 (!) 158 kg (348 lb 12.3 oz)       Flowsheet Rows      Flowsheet Row First Filed Value   Admission Height 193 cm (76\") Documented at 04/07/2024 1533   Admission Weight 154 kg (340 lb) Documented at 04/07/2024 1533            Objective:  Vital signs: (most recent): Blood pressure 134/86, pulse 88, temperature 98.1 °F (36.7 °C), temperature source Oral, resp. rate 18, height 193 cm (76\"), weight (!) 160 kg (353 lb), SpO2 96%.                Results Review:        Results from last 7 days   Lab Units 04/09/24  0402 04/08/24  0437 04/07/24  1549   WBC 10*3/mm3 5.48 7.33 9.99   HEMOGLOBIN g/dL 12.8* 13.5 15.2   HEMATOCRIT % 38.2 40.7 45.5   PLATELETS 10*3/mm3 180 212 233         PT/INR:  No results found for: \"PROTIME\"/No results found for: \"INR\"    Results from last 7 days   Lab Units 04/09/24  0402 04/08/24  0437 04/07/24  1549   SODIUM mmol/L 139 " 136 140   POTASSIUM mmol/L 4.2 4.3 4.7   CHLORIDE mmol/L 106 103 102   CO2 mmol/L 25.1 21.0* 26.0   BUN mg/dL 11 10 11   CREATININE mg/dL 0.58* 0.62* 0.72*   GLUCOSE mg/dL 109* 119* 170*   CALCIUM mg/dL 8.7 8.3* 9.1         Scheduled Meds:  apixaban, 5 mg, Oral, BID  atenolol, 50 mg, Oral, Q12H  atorvastatin, 10 mg, Oral, Nightly  cefTRIAXone, 2,000 mg, Intravenous, Q24H  colchicine, 0.6 mg, Oral, Q12H  dilTIAZem, 90 mg, Oral, Q8H  gabapentin, 300 mg, Oral, BID  insulin lispro, 2-9 Units, Subcutaneous, 4x Daily AC & at Bedtime  levothyroxine, 150 mcg, Oral, Q AM  sodium chloride, 10 mL, Intravenous, Q12H        Infusions:         Assessment & Plan       Atrial fibrillation with RVR    Hypertension    Hypothyroidism    Hyperlipidemia    Chronic systolic heart failure      Assessment & Plan    - Persistent atrial fibrillation with hx of PVI/multiple cardioversions--on Tikosyn; LD Eliquis 3/15 s/p convergent procedure/ANA occlusion ([place])  - Hypertension  - Hyperlipidemia--statin therapy  - Hypothyroidism  - Mild non-obstructive CAD  - DM II  - DDD  - Hx of TIA  - Obesity class 3 with previous gastric bypass in 1984; Ozempic on hold  - Leukocytosis--reactive/steroid administration    Patient currently on 1LNC, not on oxygen at home, appears fluid overload on exam -- IV diurese today, on lasix at home   Patient with serous drainage from left sided chest tube site, discuss with Dr. Clifton   Patient currently in atrial fibrillation with controlled rate, Ana Luisa Gregg PA-C  04/09/24  08:44 EDT

## 2024-04-09 NOTE — PLAN OF CARE
Goal Outcome Evaluation:              Outcome Evaluation: Pt A&Ox4, VSS, no c/o of pain, RA, ad ady to BR. Ct incision much better today, minimal drainage. Pt anticipating DC sheri. Will update POC as needed.

## 2024-04-09 NOTE — PLAN OF CARE
Problem: Adult Inpatient Plan of Care  Goal: Absence of Hospital-Acquired Illness or Injury  Outcome: Ongoing, Progressing  Intervention: Prevent and Manage VTE (Venous Thromboembolism) Risk  Recent Flowsheet Documentation  Taken 4/8/2024 1910 by Ailyn Santiago RN  VTE Prevention/Management: (eliquis) --  Range of Motion: active ROM (range of motion) encouraged   Goal Outcome Evaluation:      AOx4. PT anxious early in shift PRN one time atarax ordered per on call. VSS. No pain, denies needs at this current time. WCTM

## 2024-04-10 ENCOUNTER — READMISSION MANAGEMENT (OUTPATIENT)
Dept: CALL CENTER | Facility: HOSPITAL | Age: 59
End: 2024-04-10
Payer: COMMERCIAL

## 2024-04-10 VITALS
SYSTOLIC BLOOD PRESSURE: 139 MMHG | HEART RATE: 81 BPM | TEMPERATURE: 97.1 F | WEIGHT: 315 LBS | HEIGHT: 76 IN | DIASTOLIC BLOOD PRESSURE: 88 MMHG | OXYGEN SATURATION: 97 % | RESPIRATION RATE: 18 BRPM | BODY MASS INDEX: 38.36 KG/M2

## 2024-04-10 PROBLEM — R91.8 PULMONARY NODULES: Status: ACTIVE | Noted: 2024-04-10

## 2024-04-10 LAB
ANION GAP SERPL CALCULATED.3IONS-SCNC: 8.7 MMOL/L (ref 5–15)
BASOPHILS # BLD AUTO: 0.01 10*3/MM3 (ref 0–0.2)
BASOPHILS NFR BLD AUTO: 0.2 % (ref 0–1.5)
BUN SERPL-MCNC: 11 MG/DL (ref 6–20)
BUN/CREAT SERPL: 20.8 (ref 7–25)
CALCIUM SPEC-SCNC: 9.2 MG/DL (ref 8.6–10.5)
CHLORIDE SERPL-SCNC: 105 MMOL/L (ref 98–107)
CO2 SERPL-SCNC: 29.3 MMOL/L (ref 22–29)
CREAT SERPL-MCNC: 0.53 MG/DL (ref 0.76–1.27)
DEPRECATED RDW RBC AUTO: 46.1 FL (ref 37–54)
EGFRCR SERPLBLD CKD-EPI 2021: 116.2 ML/MIN/1.73
EOSINOPHIL # BLD AUTO: 0.24 10*3/MM3 (ref 0–0.4)
EOSINOPHIL NFR BLD AUTO: 4.9 % (ref 0.3–6.2)
ERYTHROCYTE [DISTWIDTH] IN BLOOD BY AUTOMATED COUNT: 12 % (ref 12.3–15.4)
GLUCOSE BLDC GLUCOMTR-MCNC: 129 MG/DL (ref 70–130)
GLUCOSE SERPL-MCNC: 115 MG/DL (ref 65–99)
HCT VFR BLD AUTO: 41 % (ref 37.5–51)
HGB BLD-MCNC: 13.7 G/DL (ref 13–17.7)
IMM GRANULOCYTES # BLD AUTO: 0.01 10*3/MM3 (ref 0–0.05)
IMM GRANULOCYTES NFR BLD AUTO: 0.2 % (ref 0–0.5)
LYMPHOCYTES # BLD AUTO: 1.02 10*3/MM3 (ref 0.7–3.1)
LYMPHOCYTES NFR BLD AUTO: 20.9 % (ref 19.6–45.3)
MCH RBC QN AUTO: 34.3 PG (ref 26.6–33)
MCHC RBC AUTO-ENTMCNC: 33.4 G/DL (ref 31.5–35.7)
MCV RBC AUTO: 102.5 FL (ref 79–97)
MONOCYTES # BLD AUTO: 0.55 10*3/MM3 (ref 0.1–0.9)
MONOCYTES NFR BLD AUTO: 11.2 % (ref 5–12)
NEUTROPHILS NFR BLD AUTO: 3.06 10*3/MM3 (ref 1.7–7)
NEUTROPHILS NFR BLD AUTO: 62.6 % (ref 42.7–76)
NRBC BLD AUTO-RTO: 0 /100 WBC (ref 0–0.2)
PLATELET # BLD AUTO: 175 10*3/MM3 (ref 140–450)
PMV BLD AUTO: 9.3 FL (ref 6–12)
POTASSIUM SERPL-SCNC: 4.1 MMOL/L (ref 3.5–5.2)
RBC # BLD AUTO: 4 10*6/MM3 (ref 4.14–5.8)
SODIUM SERPL-SCNC: 143 MMOL/L (ref 136–145)
WBC NRBC COR # BLD AUTO: 4.89 10*3/MM3 (ref 3.4–10.8)

## 2024-04-10 PROCEDURE — 94799 UNLISTED PULMONARY SVC/PX: CPT

## 2024-04-10 PROCEDURE — 80048 BASIC METABOLIC PNL TOTAL CA: CPT | Performed by: STUDENT IN AN ORGANIZED HEALTH CARE EDUCATION/TRAINING PROGRAM

## 2024-04-10 PROCEDURE — 94618 PULMONARY STRESS TESTING: CPT

## 2024-04-10 PROCEDURE — 94760 N-INVAS EAR/PLS OXIMETRY 1: CPT

## 2024-04-10 PROCEDURE — 82948 REAGENT STRIP/BLOOD GLUCOSE: CPT

## 2024-04-10 PROCEDURE — 94761 N-INVAS EAR/PLS OXIMETRY MLT: CPT

## 2024-04-10 PROCEDURE — 85025 COMPLETE CBC W/AUTO DIFF WBC: CPT | Performed by: STUDENT IN AN ORGANIZED HEALTH CARE EDUCATION/TRAINING PROGRAM

## 2024-04-10 RX ORDER — ATENOLOL 50 MG/1
50 TABLET ORAL EVERY 12 HOURS SCHEDULED
Qty: 60 TABLET | Refills: 1 | Status: SHIPPED | OUTPATIENT
Start: 2024-04-10

## 2024-04-10 RX ORDER — CEFDINIR 300 MG/1
300 CAPSULE ORAL 2 TIMES DAILY
Qty: 4 CAPSULE | Refills: 0 | Status: SHIPPED | OUTPATIENT
Start: 2024-04-10 | End: 2024-04-12

## 2024-04-10 RX ORDER — COLCHICINE 0.6 MG/1
0.6 TABLET ORAL EVERY 12 HOURS SCHEDULED
Qty: 28 TABLET | Refills: 0 | Status: SHIPPED | OUTPATIENT
Start: 2024-04-10 | End: 2024-04-24

## 2024-04-10 RX ORDER — DILTIAZEM HYDROCHLORIDE 180 MG/1
180 CAPSULE, EXTENDED RELEASE ORAL 2 TIMES DAILY
Qty: 60 CAPSULE | Refills: 0 | Status: SHIPPED | OUTPATIENT
Start: 2024-04-10 | End: 2024-04-22 | Stop reason: HOSPADM

## 2024-04-10 RX ADMIN — HYDROCODONE BITARTRATE AND ACETAMINOPHEN 1 TABLET: 5; 325 TABLET ORAL at 10:21

## 2024-04-10 RX ADMIN — APIXABAN 5 MG: 5 TABLET, FILM COATED ORAL at 08:54

## 2024-04-10 RX ADMIN — DILTIAZEM HYDROCHLORIDE 90 MG: 60 TABLET, FILM COATED ORAL at 06:41

## 2024-04-10 RX ADMIN — ATENOLOL 50 MG: 50 TABLET ORAL at 08:54

## 2024-04-10 RX ADMIN — COLCHICINE 0.6 MG: 0.6 TABLET ORAL at 08:54

## 2024-04-10 RX ADMIN — FUROSEMIDE 20 MG: 20 TABLET ORAL at 08:54

## 2024-04-10 RX ADMIN — LEVOTHYROXINE SODIUM 150 MCG: 150 TABLET ORAL at 06:41

## 2024-04-10 RX ADMIN — GABAPENTIN 300 MG: 300 CAPSULE ORAL at 08:54

## 2024-04-10 NOTE — PROGRESS NOTES
" LOS: 3 days   Patient Care Team:  Jackson Vázquez MD as PCP - General  Jackson Vázquez MD as PCP - Family Medicine  Ron Cowan MD as Consulting Physician (Cardiology)    Chief Complaint: Drainage from chest tube site     Subjective     Patient is s/p convergent procedure/ANA occlusion with Dr. Clifton on 3/21. Patient was discharged on 3/24. Patient presented to the ED on 4/7 with complaints of palpitations, left sided chest/rib pain, and drainage from left sided chest tube site. Patient is on Eliquis at home and is followed by EP.     Leave chest tube site open to air, paint with betadine, drainage improved     Objective     Vital Signs  Temp:  [97 °F (36.1 °C)-98.5 °F (36.9 °C)] 97.2 °F (36.2 °C)  Heart Rate:  [] 86  Resp:  [16-18] 16  BP: (115-148)/(66-86) 128/81  Body mass index is 42.97 kg/m².    Intake/Output Summary (Last 24 hours) at 4/10/2024 0819  Last data filed at 4/10/2024 0630  Gross per 24 hour   Intake 1080 ml   Output 3550 ml   Net -2470 ml     No intake/output data recorded.      Wt Readings from Last 3 Encounters:   04/08/24 (!) 160 kg (353 lb)   04/04/24 (!) 160 kg (352 lb)   03/24/24 (!) 158 kg (348 lb 12.3 oz)       Flowsheet Rows      Flowsheet Row First Filed Value   Admission Height 193 cm (76\") Documented at 04/07/2024 1533   Admission Weight 154 kg (340 lb) Documented at 04/07/2024 1533            Objective:  General Appearance:  Comfortable and in no acute distress.    Vital signs: (most recent): Blood pressure 128/81, pulse 79, temperature 97.2 °F (36.2 °C), resp. rate 18, height 193 cm (76\"), weight (!) 160 kg (353 lb), SpO2 96%.  Vital signs are normal.  No fever.    Lungs:  Normal effort and normal respiratory rate.  Breath sounds clear to auscultation.    Heart: Normal rate.  Irregular rhythm.    Neurological: Patient is alert and oriented to person, place and time.    Skin:  Warm and dry.                Results Review:        Results from last 7 days   Lab " "Units 04/10/24  0408 04/09/24  0402 04/08/24 0437   WBC 10*3/mm3 4.89 5.48 7.33   HEMOGLOBIN g/dL 13.7 12.8* 13.5   HEMATOCRIT % 41.0 38.2 40.7   PLATELETS 10*3/mm3 175 180 212         PT/INR:  No results found for: \"PROTIME\"/No results found for: \"INR\"    Results from last 7 days   Lab Units 04/10/24  0408 04/09/24  0402 04/08/24 0437   SODIUM mmol/L 143 139 136   POTASSIUM mmol/L 4.1 4.2 4.3   CHLORIDE mmol/L 105 106 103   CO2 mmol/L 29.3* 25.1 21.0*   BUN mg/dL 11 11 10   CREATININE mg/dL 0.53* 0.58* 0.62*   GLUCOSE mg/dL 115* 109* 119*   CALCIUM mg/dL 9.2 8.7 8.3*         Scheduled Meds:  apixaban, 5 mg, Oral, BID  atenolol, 50 mg, Oral, Q12H  atorvastatin, 10 mg, Oral, Nightly  cefTRIAXone, 2,000 mg, Intravenous, Q24H  colchicine, 0.6 mg, Oral, Q12H  dilTIAZem, 90 mg, Oral, Q8H  furosemide, 20 mg, Oral, BID  gabapentin, 300 mg, Oral, BID  insulin lispro, 2-9 Units, Subcutaneous, 4x Daily AC & at Bedtime  levothyroxine, 150 mcg, Oral, Q AM  sodium chloride, 10 mL, Intravenous, Q12H        Infusions:         Assessment & Plan       Atrial fibrillation with RVR    Hypertension    Hypothyroidism    Hyperlipidemia    Chronic systolic heart failure    Pulmonary nodules      Assessment & Plan    - Persistent atrial fibrillation with hx of PVI/multiple cardioversions--on Tikosyn; LD Eliquis 3/15 s/p convergent procedure/ANA occlusion ([place])  - Hypertension  - Hyperlipidemia--statin therapy  - Hypothyroidism  - Mild non-obstructive CAD  - DM II  - DDD  - Hx of TIA  - Obesity class 3 with previous gastric bypass in 1984; Ozempic on hold  - Leukocytosis--reactive/steroid administration    Patient currently on 1LNC, not on oxygen at home -- walking oximetry today and patient having sleep study in the future   Patient had good urine output yesterday, on PO lasix at home   Serous drainage from chest tube site has improved -- leave open to air and paint with betadine   Patient currently in atrial fibrillation with " controlled rate, Eliquis, scheduled for ablation 4/22  Ok for discharge from our standpoint     Bruna Gregg PA-C  04/10/24  08:19 EDT

## 2024-04-10 NOTE — CASE MANAGEMENT/SOCIAL WORK
Case Management Discharge Note      Final Note: home no needs    Provided Post Acute Provider List?: Refused  Refused Provider List Comment: Pt declined need for list    Selected Continued Care - Discharged on 4/10/2024 Admission date: 4/7/2024 - Discharge disposition: Home or Self Care      Destination    No services have been selected for the patient.                Durable Medical Equipment    No services have been selected for the patient.                Dialysis/Infusion    No services have been selected for the patient.                Home Medical Care    No services have been selected for the patient.                Therapy    No services have been selected for the patient.                Community Resources    No services have been selected for the patient.                Community & DME    No services have been selected for the patient.                    Transportation Services  Private: Car    Final Discharge Disposition Code: 01 - home or self-care

## 2024-04-10 NOTE — DISCHARGE SUMMARY
Patient Name: Marcus Simon  : 1965  MRN: [id number]    Date of Admission: 2024  Date of Discharge:  4/10/2024  Primary Care Physician: Jackson Vázquez MD      Chief Complaint:   Chest Pain      Discharge Diagnoses     Active Hospital Problems    Diagnosis  POA    **Atrial fibrillation with RVR [I48.91]  Yes    Pulmonary nodules [R91.8]  Yes    Chronic systolic heart failure [I50.22]  Yes    Hypertension [I10]  Yes    Hyperlipidemia [E78.5]  Yes    Hypothyroidism [E03.9]  Yes      Resolved Hospital Problems   No resolved problems to display.        Hospital Course     Mr. Simon is a 59 y.o. male with a history of Atrial fibrillation  s/p convergent procedure/ANA occlusion with Dr. Clifton, HFrEF, CAD, HTN, HLD, hypothyroidism who presented to [facility] initially complaining of tachycardia, palpitations, left sided chest/rib pain.  Please see the admitting history and physical for further details.  He was admitted to the hospital for further evaluation and treatment.     Atrial fibrillation with RVR: Noted on arrival, Cardiology followed, he was placed on Diltiazem drip that was later transitioned to PO Diltiazem, along with treatment with Atenolol and Eliquis. Patient previously on Tikosyn but this was discontinued. Rate later improved and stable by discharge, patient cleared by Cardiology with recommendation to Continue Diltiazem, Atenolol and Eliquis as prescribed. Continue to hold Tikosyn. Cardiology has plans for clsoe outpatient follow up.     Dyspnea/Pericardial effusion/Pleural effusions/other fluid overload/Chronic HFrEF: Cardiology suspected Pericarditis, started colchicine and ordered 2D echocardiogram which showed Left ventricular systolic function is mildly decreased. Left ventricular ejection fraction appears to be 41 - 45%. Moderately reduced right ventricular systolic function noted. Left and right atrial dilatation. Discussed with Cardiology, recommending  continuation of colchicine at current dose at discharge for 2 weeks and they plan to further address at follow up appointment. CT surgery consulted for drainage from prior chest tube site, they ordered x1 dose of IV Lasix due to fluid overload, home diuretic initially held due to concern for possible infection, but later resumed, with plans to continue dose as previously prescribed after discharge. Dyspnea improved by time of discharge. Continue medications as prescribed. Patient provided discharge instructions on weight monitoring and when to return to hospital. Recommend low sodium diet. Follow up with Cardiology as scheduled.    SIRS: Patient had fever and tachycardia on arrival.  Infectious workup unremarkable, fever resolved, he was treated with Ceftriaxone. Will give a few days of oral antibiotics to complete a 5 day course at discharge.     Pulmonary nodules: Noted on imaging, new 15 x 10 mm right perihilar nodule within the right upper lobe is favored to be inflammatory or infectious in etiology, unchanged 12 m left lower lobe nodule and additional sub-5 mm nodules when compared to the examination approximately 2 months ago. Will need repeat outpatient CT scan for ongoing monitoring. Place Ambulatory referral to Pulmonology at discharge. Also recommend attention to this by PCP at follow up to ensure follow up imaging is ordered.    Nocturnal hypoxemia: Noted on vitals, patient could have undiagnosed sleep apnea, denies prior testing. Walking oximetry performed--does not require O2. Will place ambulatory referral to sleep medicine for evaluation.    Hypertension: Blood pressure appears stable and acceptable acutely at this time. No indications to warrant acute changes/intervention at this time. Continue current medications as prescribed.  Recommend that patient check his blood pressure 2-3 times daily and record those values in log book and take with him to next PCP visit to allow for greater insight into  treatment efficacy to guide further management decisions. Recommend heart healthy/low sodium diet.    Prediabetes: A1c 6.20% consistent with diagnosis. Recommend that patient check his blood glucose 2-3 times daily and record those values in log book and take with him to next PCP visit to allow for greater insight into treatment efficacy to guide further management decisions. Recommend consistent carbohydrate/diabetic diet.    HLD: Statin    Hypothyroidism: Synthroid.      Day of Discharge     Subjective:  Patient seen and examined this morning.  Awake and resting comfortably, states that he feels better overall, cleared for discharge by consultants with plans for outpatient follow-up.    Physical Exam:  Temp:  [97 °F (36.1 °C)-98.5 °F (36.9 °C)] 97.2 °F (36.2 °C)  Heart Rate:  [] 86  Resp:  [16-18] 16  BP: (115-148)/(66-86) 128/81  Body mass index is 42.97 kg/m².  Physical Exam  Vitals and nursing note reviewed.   Constitutional:       General: He is awake. He is not in acute distress.     Appearance: He is obese.   Eyes:      General: No scleral icterus.  Cardiovascular:      Rate and Rhythm: Normal rate.      Pulses: Normal pulses.      Heart sounds: Normal heart sounds.   Pulmonary:      Effort: Pulmonary effort is normal. No respiratory distress.      Breath sounds: No wheezing or rhonchi.   Abdominal:      Palpations: Abdomen is soft.      Tenderness: There is no abdominal tenderness.   Skin:     General: Skin is warm and dry.   Neurological:      Mental Status: He is alert.   Psychiatric:         Behavior: Behavior is cooperative.         Consultants     Consult Orders (all) (From admission, onward)       Start     Ordered    04/07/24 2026  Inpatient Cardiology Consult  Once        Specialty:  Cardiology  Provider:  Melba Lawrence MD    04/07/24 2026 04/07/24 1846  [facility] (on-call MD unless specified) Details  Once        Specialty:  Hospitalist  Provider:  Onel Watt MD    04/07/24 3930     04/07/24 1822  Cardiology (on-call MD unless specified)  Once        Specialty:  Cardiology  Provider:  Melba Lawrence MD    04/07/24 1823                  Procedures     * Surgery not found *    Imaging Results (All)       Procedure Component Value Units Date/Time    CT Angiogram Chest Pulmonary Embolism [[id number]] Collected: 04/07/24 1757     Updated: 04/07/24 1815    Narrative:      CT ANGIOGRAM OF THE CHEST. MULTIPLE CORONAL, SAGITTAL, AND 3-D  RECONSTRUCTIONS.     HISTORY: Shortness of air. Possible pulmonary embolism.. On 03/21/2024,  patient underwent open heart conversion hybrid ablation procedure with  left atrial appendage clipping.     TECHNIQUE: Radiation dose reduction techniques were utilized, including  automated exposure control and exposure modulation based on body size.   CT angiogram of the chest was performed following the administration of  IV contrast. Coronal, sagittal, and 3-D reconstruction images were  obtained.      COMPARISON: CT chest 02/08/2024, AP chest 04/07/2024     FINDINGS:  There are extensive coronary arterial calcifications.  There is no intrapulmonary arterial filling defect to diagnose a  pulmonary embolus.     There is a new small pericardial effusion. There is stranding extending  within the presternal and subxiphoid subcutaneous fat and anterior  mediastinal fat. Thoracic aorta appears within normal limits. Small left  greater than right pleural effusions are present.     1.2 cm noncalcified left lower lobe pulmonary nodule is without change  compared to exam 2 months ago. Additional sub-5 mm nodules are without  change. There has developed a new right upper lobe nodule extending  posterior to the right hilum measuring 15 x 10 mm on image 66 and this  is likely inflammatory or infectious. There are additional smaller  adjacent micronodules. There is evidence for previous sleeve  gastrectomy. There is multilevel bridging endplate spur formation within  the thoracic spine  compatible with DISH.       Impression:      1. No evidence for pulmonary thromboembolic disease.  2. New small pericardial effusion. This may be related to recent  surgery. Pericarditis could be considered in the proper clinical  setting.  3. New 15 x 10 mm right perihilar nodule within the right upper lobe is  favored to be inflammatory or infectious in etiology. Recommend  follow-up to resolution.  4. No change in 12 m left lower lobe nodule and additional sub-5 mm  nodules when compared to the examination approximately 2 months ago.  Continued CT surveillance recommended.  5. New small left greater than right pleural effusions.     Radiation dose reduction techniques were utilized, including automated  exposure control and exposure modulation based on body size.        This report was finalized on 4/7/2024 6:12 PM by Dr. Harsh Barraza M.D on Workstation: [id number]       XR Chest 1 View [[id number]] Collected: 04/07/24 1624     Updated: 04/07/24 1644    Narrative:      CHEST SINGLE VIEW     HISTORY: Chest pain     COMPARISON: AP chest 03/24/2024, 03/23/2024     FINDINGS: Heart size is enlarged. Defibrillator pads and cardiac monitor  and leads are present. Lungs appear clear and there is no evidence for  pulmonary edema or pleural effusion  .    Impression:      Cardiomegaly. No evidence for active disease in the chest.     This report was finalized on 4/7/2024 4:41 PM by Dr. Harsh Barraza M.D on Workstation: [id number]               Results for orders placed during the hospital encounter of 04/07/24    Adult Transthoracic Echo Complete W/ Cont if Necessary Per Protocol    Interpretation Summary    Left ventricular systolic function is mildly decreased. Left ventricular ejection fraction appears to be 41 - 45%.    Moderately reduced right ventricular systolic function noted.    The right ventricular cavity is mild to moderately dilated.    The left atrial cavity is mildly dilated.    The right atrial  "cavity is moderately  dilated.    There is mild calcification of the aortic valve.  No significant stenosis or regurgitation noted.    Estimated right ventricular systolic pressure from tricuspid regurgitation is normal (<35 mmHg).    Pertinent Labs     Results from last 7 days   Lab Units 04/10/24  0408 04/09/24  0402 04/08/24  0437 04/07/24  1549   WBC 10*3/mm3 4.89 5.48 7.33 9.99   HEMOGLOBIN g/dL 13.7 12.8* 13.5 15.2   PLATELETS 10*3/mm3 175 180 212 233     Results from last 7 days   Lab Units 04/10/24  0408 04/09/24  0402 04/08/24  0437 04/07/24  1549   SODIUM mmol/L 143 139 136 140   POTASSIUM mmol/L 4.1 4.2 4.3 4.7   CHLORIDE mmol/L 105 106 103 102   CO2 mmol/L 29.3* 25.1 21.0* 26.0   BUN mg/dL 11 11 10 11   CREATININE mg/dL 0.53* 0.58* 0.62* 0.72*   GLUCOSE mg/dL 115* 109* 119* 170*   EGFR mL/min/1.73 116.2 113.0 110.8 105.9     Results from last 7 days   Lab Units 04/08/24 0437 04/07/24  1549   ALBUMIN g/dL 3.5 4.2   BILIRUBIN mg/dL 1.0 0.9   ALK PHOS U/L 98 124*   AST (SGOT) U/L 13 15   ALT (SGPT) U/L 19 25     Results from last 7 days   Lab Units 04/10/24  0408 04/09/24  0402 04/08/24  0437 04/07/24  1549   CALCIUM mg/dL 9.2 8.7 8.3* 9.1   ALBUMIN g/dL  --   --  3.5 4.2   MAGNESIUM mg/dL  --   --  1.7  --        Results from last 7 days   Lab Units 04/07/24  1827 04/07/24  1549   HSTROP T ng/L 36* 35*   PROBNP pg/mL 2,075.0*  --            Invalid input(s): \"LDLCALC\"  Results from last 7 days   Lab Units 04/07/24  1704   BLOODCX  No growth at 2 days  No growth at 2 days     Results from last 7 days   Lab Units 04/07/24  1606   COVID19  Not Detected       Test Results Pending at Discharge     Pending Labs       Order Current Status    Blood Culture - Blood, Arm, Left Preliminary result    Blood Culture - Blood, Hand, Left Preliminary result            Discharge Details        Discharge Medications        New Medications        Instructions Start Date   cefdinir 300 MG capsule  Commonly known as: " OMNICEF   300 mg, Oral, 2 Times Daily      dilTIAZem  MG 24 hr capsule  Commonly known as: DILACOR XR   180 mg, Oral, 2 Times Daily             Changes to Medications        Instructions Start Date   atenolol 50 MG tablet  Commonly known as: TENORMIN  What changed:   medication strength  how much to take  when to take this   50 mg, Oral, Every 12 Hours Scheduled      colchicine 0.6 MG tablet  What changed: Another medication with the same name was added. Make sure you understand how and when to take each.   Daily PRN      colchicine 0.6 MG tablet  What changed: You were already taking a medication with the same name, and this prescription was added. Make sure you understand how and when to take each.   0.6 mg, Oral, Every 12 Hours Scheduled             Continue These Medications        Instructions Start Date   atorvastatin 10 MG tablet  Commonly known as: LIPITOR   10 mg, Oral, Nightly      baclofen 10 MG tablet  Commonly known as: LIORESAL   10 mg, Oral, 3 Times Daily PRN      benzonatate 200 MG capsule  Commonly known as: TESSALON   2 Times Daily PRN      bisacodyl 5 MG EC tablet  Commonly known as: DULCOLAX   5 mg, Oral, Daily PRN      Eliquis 5 MG tablet tablet  Generic drug: apixaban   5 mg, Oral, 2 Times Daily      ferrous gluconate 324 MG tablet  Commonly known as: FERGON   324 mg, Oral, 2 Times Daily      furosemide 20 MG tablet  Commonly known as: Lasix   20 mg, Oral, 2 Times Daily      gabapentin 300 MG capsule  Commonly known as: NEURONTIN   300 mg, Oral, 2 Times Daily      HYDROcodone-acetaminophen 5-325 MG per tablet  Commonly known as: NORCO   1 tablet, Oral, Every 6 Hours PRN      levothyroxine 150 MCG tablet  Commonly known as: SYNTHROID, LEVOTHROID   150 mcg, Oral, Daily      metFORMIN 500 MG tablet  Commonly known as: GLUCOPHAGE   500 mg, Oral, Daily With Breakfast      Ozempic (0.25 or 0.5 MG/DOSE) 2 MG/1.5ML solution pen-injector  Generic drug: Semaglutide(0.25 or 0.5MG/DOS)    Subcutaneous, Weekly      Vitamin D (Cholecalciferol) 10 MCG (400 UNIT) tablet  Commonly known as: CHOLECALCIFEROL   400 Units, Oral, Daily      vitamin E 100 UNIT capsule   100 Units, Oral, Daily, HOLD PER MD INSTR             Stop These Medications      dofetilide 500 MCG capsule  Commonly known as: TIKOSYN              Allergies   Allergen Reactions    Sulfa Antibiotics Unknown - High Severity     AS A CHILD    Penicillins Rash       Discharge Disposition:  Home or Self Care      Discharge Diet:  Diet Order   Procedures    Diet: Cardiac, Diabetic; Healthy Heart (2-3 Na+); Consistent Carbohydrate; Fluid Consistency: Thin (IDDSI 0)       Discharge Activity:   Activity Instructions       Gradually Increase Activity Until at Pre-Hospitalization Level              CODE STATUS:    Code Status and Medical Interventions:   Ordered at: 04/07/24 2026     Code Status (Patient has no pulse and is not breathing):    CPR (Attempt to Resuscitate)     Medical Interventions (Patient has pulse or is breathing):    Full Support       No future appointments.  Additional Instructions for the Follow-ups that You Need to Schedule       Discharge Follow-up with PCP   As directed       Currently Documented PCP:    Jackson Vázquez MD    PCP Phone Number:    [phone]     Follow Up Details: Within 1 week after discharge for reassessment, medication and symptom review, blood pressure and blood glucose check, BMP and CBC        Discharge Follow-up with Specialty: Cardiology, CT surgery   As directed      Specialty: Cardiology, CT surgery   Follow Up Details: within 1-2 weeks or as scheduled. Please call to ensure follow up appointment is made.               Follow-up Information       Jackson Vázquez MD .    Specialty: Family Medicine  Why: Within 1 week after discharge for reassessment, medication and symptom review, blood pressure and blood glucose check, BMP and CBC  Contact information:  [id number]  [facility]  [address]  [phone]                             Additional Instructions for the Follow-ups that You Need to Schedule       Discharge Follow-up with PCP   As directed       Currently Documented PCP:    Jackson Vázquez MD    PCP Phone Number:    [phone]     Follow Up Details: Within 1 week after discharge for reassessment, medication and symptom review, blood pressure and blood glucose check, BMP and CBC        Discharge Follow-up with Specialty: Cardiology, CT surgery   As directed      Specialty: Cardiology, CT surgery   Follow Up Details: within 1-2 weeks or as scheduled. Please call to ensure follow up appointment is made.            Time Spent on Discharge:  Greater than 30 minutes      Wilfred Fernandes DO  [facility]  04/10/24  08:28 EDT

## 2024-04-10 NOTE — PROGRESS NOTES
Exercise Oximetry    Patient Name:Marcus Simon   MRN: [id number]   Date: 04/10/24             ROOM AIR BASELINE   SpO2% 94   Heart Rate 82   Blood Pressure      EXERCISE ON ROOM AIR SpO2% EXERCISE ON O2 @  LPM SpO2%   1 MINUTE 94 1 MINUTE    2 MINUTES 96 2 MINUTES    3 MINUTES 97 3 MINUTES    4 MINUTES 95 4 MINUTES    5 MINUTES 95 5 MINUTES    6 MINUTES 96 6 MINUTES               Distance Walked   Distance Walked   Dyspnea (Kenney Scale)   Dyspnea (Kenney Scale)   Fatigue (Kenney Scale)   Fatigue (Kenney Scale)   SpO2% Post Exercise   SpO2% Post Exercise   HR Post Exercise   HR Post Exercise   Time to Recovery   Time to Recovery     Comments: Patient walked around the nurses station 2x, did not experience desaturations for the duration of the walking oximetry, does not require home O2 at this time.

## 2024-04-11 NOTE — OUTREACH NOTE
Prep Survey      Flowsheet Row Responses   [facility] facility patient discharged from? [facility]   Is LACE score < 7 ? No   Eligibility Readm Mgmt   Discharge diagnosis Atrial fibrillatino with RVR   Does the patient have one of the following disease processes/diagnoses(primary or secondary)? Other   Does the patient have Home health ordered? No   Is there a DME ordered? No   Prep survey completed? Yes            ADITYA GONZALEZ - Registered Nurse

## 2024-04-12 LAB
BACTERIA SPEC AEROBE CULT: NORMAL
BACTERIA SPEC AEROBE CULT: NORMAL

## 2024-04-15 ENCOUNTER — READMISSION MANAGEMENT (OUTPATIENT)
Dept: CALL CENTER | Facility: HOSPITAL | Age: 59
End: 2024-04-15
Payer: COMMERCIAL

## 2024-04-15 NOTE — OUTREACH NOTE
Medical Week 1 Survey      Flowsheet Row Responses   [facility] patient discharged from? [facility]   Does the patient have one of the following disease processes/diagnoses(primary or secondary)? Other   Week 1 attempt successful? Yes   Call start time 1813   Call end time 1819   Discharge diagnosis Atrial fibrillatino with RVR   Person spoke with today (if not patient) and relationship pt   Meds reviewed with patient/caregiver? Yes   Is the patient having any side effects they believe may be caused by any medication additions or changes? No   Does the patient have all medications ordered at discharge? Yes   Is the patient taking all medications as directed (includes completed medication regime)? Yes   Does the patient have a primary care provider?  Yes   Does the patient have an appointment with their PCP within 7 days of discharge? Yes   Has the patient kept scheduled appointments due by today? N/A   Psychosocial issues? No   Did the patient receive a copy of their discharge instructions? Yes   Nursing interventions Reviewed instructions with patient   What is the patient's perception of their health status since discharge? Improving   Is the patient/caregiver able to teach back signs and symptoms related to disease process for when to call PCP? Yes   Is the patient/caregiver able to teach back signs and symptoms related to disease process for when to call 911? Yes   Is the patient/caregiver able to teach back the hierarchy of who to call/visit for symptoms/problems? PCP, Specialist, Home health nurse, Urgent Care, ED, 911 Yes   If the patient is a current smoker, are they able to teach back resources for cessation? Not a smoker   Week 1 call completed? Yes   Would this patient benefit from a Referral to Amb Social Work? No   Is the patient interested in additional calls from an ambulatory ? No   Wrap up additional comments Pt states he is doing better, and denies any irregular heartbeats, chest  pain since hospital dc. Reviewed AVS/meds with pt. Pt advised to make a PCP fu appt, and pt verified upcoming echo on 4/22/24.   Call end time 1819            Clarisa FLANAGAN - Registered Nurse

## 2024-04-16 ENCOUNTER — TELEPHONE (OUTPATIENT)
Dept: CARDIOLOGY | Facility: CLINIC | Age: 59
End: 2024-04-16
Payer: COMMERCIAL

## 2024-04-16 NOTE — TELEPHONE ENCOUNTER
Caller: Marcus Simon    Relationship: Self    Best call back number: [phone]    What is the best time to reach you: ANYTIME    Who are you requesting to speak with (clinical staff, provider,  specific staff member): CLINICAL    What was the call regarding: PT WANTED TO KNOW HOW SOON HE NEEDED TO HAVE LABS DONE BEFORE PROCEDURE ON 4/22/24

## 2024-04-22 ENCOUNTER — ANESTHESIA (OUTPATIENT)
Dept: CARDIOLOGY | Facility: HOSPITAL | Age: 59
End: 2024-04-22
Payer: COMMERCIAL

## 2024-04-22 ENCOUNTER — HOSPITAL ENCOUNTER (OUTPATIENT)
Dept: CARDIOLOGY | Facility: HOSPITAL | Age: 59
Discharge: HOME | End: 2024-04-22
Payer: COMMERCIAL

## 2024-04-22 ENCOUNTER — HOSPITAL ENCOUNTER (OUTPATIENT)
Facility: HOSPITAL | Age: 59
Setting detail: HOSPITAL OUTPATIENT SURGERY
Discharge: HOME | End: 2024-04-22
Attending: INTERNAL MEDICINE | Admitting: INTERNAL MEDICINE
Payer: COMMERCIAL

## 2024-04-22 ENCOUNTER — ANESTHESIA EVENT (OUTPATIENT)
Dept: CARDIOLOGY | Facility: HOSPITAL | Age: 59
End: 2024-04-22
Payer: COMMERCIAL

## 2024-04-22 VITALS
WEIGHT: 315 LBS | RESPIRATION RATE: 18 BRPM | OXYGEN SATURATION: 92 % | BODY MASS INDEX: 38.36 KG/M2 | HEIGHT: 76 IN | DIASTOLIC BLOOD PRESSURE: 71 MMHG | HEART RATE: 77 BPM | SYSTOLIC BLOOD PRESSURE: 120 MMHG | TEMPERATURE: 98.4 F

## 2024-04-22 DIAGNOSIS — I48.19 ATRIAL FIBRILLATION, PERSISTENT: ICD-10-CM

## 2024-04-22 LAB
ACT BLD: 320 SECONDS (ref 82–152)
ACT BLD: 331 SECONDS (ref 82–152)
QT INTERVAL: 407 MS
QT INTERVAL: 600 MS
QTC INTERVAL: 470 MS
QTC INTERVAL: 653 MS

## 2024-04-22 PROCEDURE — 25810000003 SODIUM CHLORIDE 0.9 % SOLUTION: Performed by: INTERNAL MEDICINE

## 2024-04-22 PROCEDURE — C1732 CATH, EP, DIAG/ABL, 3D/VECT: HCPCS | Performed by: INTERNAL MEDICINE

## 2024-04-22 PROCEDURE — 25010000002 PROPOFOL 10 MG/ML EMULSION: Performed by: NURSE ANESTHETIST, CERTIFIED REGISTERED

## 2024-04-22 PROCEDURE — C1894 INTRO/SHEATH, NON-LASER: HCPCS | Performed by: INTERNAL MEDICINE

## 2024-04-22 PROCEDURE — 93320 DOPPLER ECHO COMPLETE: CPT

## 2024-04-22 PROCEDURE — 93005 ELECTROCARDIOGRAM TRACING: CPT | Performed by: INTERNAL MEDICINE

## 2024-04-22 PROCEDURE — 93655 ICAR CATH ABLTJ DSCRT ARRHYT: CPT | Performed by: INTERNAL MEDICINE

## 2024-04-22 PROCEDURE — 25010000002 ONDANSETRON PER 1 MG: Performed by: NURSE ANESTHETIST, CERTIFIED REGISTERED

## 2024-04-22 PROCEDURE — C1759 CATH, INTRA ECHOCARDIOGRAPHY: HCPCS | Performed by: INTERNAL MEDICINE

## 2024-04-22 PROCEDURE — 93312 ECHO TRANSESOPHAGEAL: CPT | Performed by: INTERNAL MEDICINE

## 2024-04-22 PROCEDURE — 25010000002 FENTANYL CITRATE (PF) 50 MCG/ML SOLUTION: Performed by: INTERNAL MEDICINE

## 2024-04-22 PROCEDURE — 93656 COMPRE EP EVAL ABLTJ ATR FIB: CPT | Performed by: INTERNAL MEDICINE

## 2024-04-22 PROCEDURE — 85347 COAGULATION TIME ACTIVATED: CPT

## 2024-04-22 PROCEDURE — C1893 INTRO/SHEATH, FIXED,NON-PEEL: HCPCS | Performed by: INTERNAL MEDICINE

## 2024-04-22 PROCEDURE — 25010000002 PROTAMINE SULFATE PER 10 MG: Performed by: INTERNAL MEDICINE

## 2024-04-22 PROCEDURE — 25010000002 HEPARIN (PORCINE) PER 1000 UNITS: Performed by: INTERNAL MEDICINE

## 2024-04-22 PROCEDURE — 93320 DOPPLER ECHO COMPLETE: CPT | Performed by: INTERNAL MEDICINE

## 2024-04-22 PROCEDURE — 25010000002 SUGAMMADEX 200 MG/2ML SOLUTION: Performed by: NURSE ANESTHETIST, CERTIFIED REGISTERED

## 2024-04-22 PROCEDURE — 25010000002 DEXAMETHASONE PER 1 MG: Performed by: NURSE ANESTHETIST, CERTIFIED REGISTERED

## 2024-04-22 PROCEDURE — C1730 CATH, EP, 19 OR FEW ELECT: HCPCS | Performed by: INTERNAL MEDICINE

## 2024-04-22 PROCEDURE — C1760 CLOSURE DEV, VASC: HCPCS | Performed by: INTERNAL MEDICINE

## 2024-04-22 PROCEDURE — 93325 DOPPLER ECHO COLOR FLOW MAPG: CPT

## 2024-04-22 PROCEDURE — 25010000002 PHENYLEPHRINE 10 MG/ML SOLUTION 5 ML VIAL: Performed by: NURSE ANESTHETIST, CERTIFIED REGISTERED

## 2024-04-22 PROCEDURE — 93010 ELECTROCARDIOGRAM REPORT: CPT | Performed by: INTERNAL MEDICINE

## 2024-04-22 PROCEDURE — 25810000003 SODIUM CHLORIDE 0.9 % SOLUTION 250 ML FLEX CONT: Performed by: NURSE ANESTHETIST, CERTIFIED REGISTERED

## 2024-04-22 PROCEDURE — 93312 ECHO TRANSESOPHAGEAL: CPT

## 2024-04-22 PROCEDURE — 93325 DOPPLER ECHO COLOR FLOW MAPG: CPT | Performed by: INTERNAL MEDICINE

## 2024-04-22 RX ORDER — SODIUM CHLORIDE 0.9 % (FLUSH) 0.9 %
10 SYRINGE (ML) INJECTION EVERY 12 HOURS SCHEDULED
Status: DISCONTINUED | OUTPATIENT
Start: 2024-04-22 | End: 2024-04-22 | Stop reason: HOSPADM

## 2024-04-22 RX ORDER — PROMETHAZINE HYDROCHLORIDE 12.5 MG/1
25 TABLET ORAL ONCE AS NEEDED
Status: DISCONTINUED | OUTPATIENT
Start: 2024-04-22 | End: 2024-04-22 | Stop reason: HOSPADM

## 2024-04-22 RX ORDER — PROPOFOL 10 MG/ML
VIAL (ML) INTRAVENOUS AS NEEDED
Status: DISCONTINUED | OUTPATIENT
Start: 2024-04-22 | End: 2024-04-22 | Stop reason: SURG

## 2024-04-22 RX ORDER — FLUMAZENIL 0.1 MG/ML
0.2 INJECTION INTRAVENOUS AS NEEDED
Status: DISCONTINUED | OUTPATIENT
Start: 2024-04-22 | End: 2024-04-22 | Stop reason: HOSPADM

## 2024-04-22 RX ORDER — DROPERIDOL 2.5 MG/ML
0.62 INJECTION, SOLUTION INTRAMUSCULAR; INTRAVENOUS
Status: DISCONTINUED | OUTPATIENT
Start: 2024-04-22 | End: 2024-04-22 | Stop reason: HOSPADM

## 2024-04-22 RX ORDER — ONDANSETRON 2 MG/ML
4 INJECTION INTRAMUSCULAR; INTRAVENOUS ONCE AS NEEDED
Status: DISCONTINUED | OUTPATIENT
Start: 2024-04-22 | End: 2024-04-22 | Stop reason: HOSPADM

## 2024-04-22 RX ORDER — PROTAMINE SULFATE 10 MG/ML
INJECTION, SOLUTION INTRAVENOUS
Status: DISCONTINUED | OUTPATIENT
Start: 2024-04-22 | End: 2024-04-22 | Stop reason: HOSPADM

## 2024-04-22 RX ORDER — ROCURONIUM BROMIDE 10 MG/ML
INJECTION, SOLUTION INTRAVENOUS AS NEEDED
Status: DISCONTINUED | OUTPATIENT
Start: 2024-04-22 | End: 2024-04-22 | Stop reason: SURG

## 2024-04-22 RX ORDER — NITROGLYCERIN 0.4 MG/1
0.4 TABLET SUBLINGUAL
Status: DISCONTINUED | OUTPATIENT
Start: 2024-04-22 | End: 2024-04-22 | Stop reason: HOSPADM

## 2024-04-22 RX ORDER — FENTANYL CITRATE 50 UG/ML
50 INJECTION, SOLUTION INTRAMUSCULAR; INTRAVENOUS ONCE
Status: DISCONTINUED | OUTPATIENT
Start: 2024-04-22 | End: 2024-04-22 | Stop reason: HOSPADM

## 2024-04-22 RX ORDER — SODIUM CHLORIDE 0.9 % (FLUSH) 0.9 %
10 SYRINGE (ML) INJECTION AS NEEDED
Status: DISCONTINUED | OUTPATIENT
Start: 2024-04-22 | End: 2024-04-22 | Stop reason: HOSPADM

## 2024-04-22 RX ORDER — DEXAMETHASONE SODIUM PHOSPHATE 4 MG/ML
INJECTION, SOLUTION INTRA-ARTICULAR; INTRALESIONAL; INTRAMUSCULAR; INTRAVENOUS; SOFT TISSUE AS NEEDED
Status: DISCONTINUED | OUTPATIENT
Start: 2024-04-22 | End: 2024-04-22 | Stop reason: SURG

## 2024-04-22 RX ORDER — FAMOTIDINE 10 MG/ML
INJECTION, SOLUTION INTRAVENOUS AS NEEDED
Status: DISCONTINUED | OUTPATIENT
Start: 2024-04-22 | End: 2024-04-22 | Stop reason: SURG

## 2024-04-22 RX ORDER — ONDANSETRON 2 MG/ML
INJECTION INTRAMUSCULAR; INTRAVENOUS AS NEEDED
Status: DISCONTINUED | OUTPATIENT
Start: 2024-04-22 | End: 2024-04-22 | Stop reason: SURG

## 2024-04-22 RX ORDER — ONDANSETRON 2 MG/ML
4 INJECTION INTRAMUSCULAR; INTRAVENOUS EVERY 6 HOURS PRN
Status: DISCONTINUED | OUTPATIENT
Start: 2024-04-22 | End: 2024-04-22 | Stop reason: HOSPADM

## 2024-04-22 RX ORDER — ACETAMINOPHEN 650 MG/1
650 SUPPOSITORY RECTAL EVERY 4 HOURS PRN
Status: DISCONTINUED | OUTPATIENT
Start: 2024-04-22 | End: 2024-04-22 | Stop reason: HOSPADM

## 2024-04-22 RX ORDER — DIPHENHYDRAMINE HYDROCHLORIDE 50 MG/ML
12.5 INJECTION INTRAMUSCULAR; INTRAVENOUS
Status: DISCONTINUED | OUTPATIENT
Start: 2024-04-22 | End: 2024-04-22 | Stop reason: HOSPADM

## 2024-04-22 RX ORDER — ACETAMINOPHEN 325 MG/1
650 TABLET ORAL EVERY 4 HOURS PRN
Status: DISCONTINUED | OUTPATIENT
Start: 2024-04-22 | End: 2024-04-22 | Stop reason: HOSPADM

## 2024-04-22 RX ORDER — LIDOCAINE HYDROCHLORIDE 20 MG/ML
INJECTION, SOLUTION INFILTRATION; PERINEURAL AS NEEDED
Status: DISCONTINUED | OUTPATIENT
Start: 2024-04-22 | End: 2024-04-22 | Stop reason: SURG

## 2024-04-22 RX ORDER — PROMETHAZINE HYDROCHLORIDE 25 MG/1
25 SUPPOSITORY RECTAL ONCE AS NEEDED
Status: DISCONTINUED | OUTPATIENT
Start: 2024-04-22 | End: 2024-04-22 | Stop reason: HOSPADM

## 2024-04-22 RX ORDER — HEPARIN SODIUM 1000 [USP'U]/ML
INJECTION, SOLUTION INTRAVENOUS; SUBCUTANEOUS
Status: DISCONTINUED | OUTPATIENT
Start: 2024-04-22 | End: 2024-04-22 | Stop reason: HOSPADM

## 2024-04-22 RX ORDER — NALOXONE HCL 0.4 MG/ML
0.2 VIAL (ML) INJECTION AS NEEDED
Status: DISCONTINUED | OUTPATIENT
Start: 2024-04-22 | End: 2024-04-22 | Stop reason: HOSPADM

## 2024-04-22 RX ORDER — SODIUM CHLORIDE 9 MG/ML
75 INJECTION, SOLUTION INTRAVENOUS CONTINUOUS
Status: DISCONTINUED | OUTPATIENT
Start: 2024-04-22 | End: 2024-04-22 | Stop reason: HOSPADM

## 2024-04-22 RX ORDER — SODIUM CHLORIDE 9 MG/ML
40 INJECTION, SOLUTION INTRAVENOUS AS NEEDED
Status: DISCONTINUED | OUTPATIENT
Start: 2024-04-22 | End: 2024-04-22 | Stop reason: HOSPADM

## 2024-04-22 RX ADMIN — FAMOTIDINE 20 MG: 10 INJECTION INTRAVENOUS at 11:40

## 2024-04-22 RX ADMIN — ROCURONIUM BROMIDE 60 MG: 10 INJECTION, SOLUTION INTRAVENOUS at 11:47

## 2024-04-22 RX ADMIN — ONDANSETRON 4 MG: 2 INJECTION INTRAMUSCULAR; INTRAVENOUS at 13:51

## 2024-04-22 RX ADMIN — ROCURONIUM BROMIDE 20 MG: 10 INJECTION, SOLUTION INTRAVENOUS at 12:47

## 2024-04-22 RX ADMIN — FENTANYL CITRATE 50 MCG: 50 INJECTION, SOLUTION INTRAMUSCULAR; INTRAVENOUS at 14:47

## 2024-04-22 RX ADMIN — SUGAMMADEX 350 MG: 100 INJECTION, SOLUTION INTRAVENOUS at 14:01

## 2024-04-22 RX ADMIN — SODIUM CHLORIDE 75 ML/HR: 9 INJECTION, SOLUTION INTRAVENOUS at 09:49

## 2024-04-22 RX ADMIN — DEXAMETHASONE SODIUM PHOSPHATE 8 MG: 4 INJECTION, SOLUTION INTRA-ARTICULAR; INTRALESIONAL; INTRAMUSCULAR; INTRAVENOUS; SOFT TISSUE at 11:54

## 2024-04-22 RX ADMIN — ROCURONIUM BROMIDE 20 MG: 10 INJECTION, SOLUTION INTRAVENOUS at 13:17

## 2024-04-22 RX ADMIN — PHENYLEPHRINE HYDROCHLORIDE 0.5 MCG/KG/MIN: 10 INJECTION, SOLUTION INTRAVENOUS at 12:18

## 2024-04-22 RX ADMIN — PROPOFOL 200 MG: 10 INJECTION, EMULSION INTRAVENOUS at 11:47

## 2024-04-22 RX ADMIN — LIDOCAINE HYDROCHLORIDE 100 MG: 20 INJECTION, SOLUTION INFILTRATION; PERINEURAL at 11:47

## 2024-04-22 NOTE — Clinical Note
Prepped: groin. Prepped with: ChloraPrep. The site was clipped. Clipped prior to entering procedure room.

## 2024-04-22 NOTE — DISCHARGE INSTRUCTIONS
[facility]  Cardiology  [address] [place] [address]  [phone]    Coronary Ablation After Care    Refer to this sheet in the next few weeks. These instructions provide you with information on caring for yourself after your procedure. Your health care provider may also give you more specific instructions. Your treatment has been planned according to current medical practices, but problems sometimes occur. Call your health care provider if you have any problems or questions after your procedure.      What to Expect After the Procedure:  After your procedure, it is typical to have the following sensations:  Minor discomfort or tenderness and a small bump at the catheter insertion site(s). The bump(s) should usually decrease in size and tenderness within 1 to 2 weeks.  Any bruising will usually fade within 2 to 4 weeks.  Home Care Instructions:  Do not apply powder or lotion to the site.  Do not take baths, swim, or use a hot tub until your health care provider approves and the site is completely healed.  Do not bend, squat, or lift anything over 20 lb (9 kg) or as directed by your health care provider. However, we recommend lifting nothing heavier than a gallon of milk.    You may shower 24 hours after the procedure. Remove the bandage (dressing) and gently wash the site with plain soap and water. Gently pat the site dry. You may apply a band aid daily for 2 days if desired.    Inspect the site at least twice daily.  Increase your fluid intake for the next 2 days.    Limit your activity for the first 48 hours.   Avoid strenuous activity for 1 week or as advised by your physician.    Follow instructions about when you can drive or return to work as directed by your physician.    Hold direct pressure over the site when you cough, sneeze, laugh or change positions.  Do this for the next 2 days.    Call Your Doctor If:  You have drainage (other than a small amount of blood on the dressing).  You  have chills or a fever > 101.  You have redness, warmth, swelling (larger than a walnut), or pain at the insertion   You develop palpitations, chest pain or shortness of breath, feel faint, or pass out.  You develop pain, discoloration, coldness, numbness, tingling, or severe bruising in the leg that held the catheter.  You develop bleeding from any other place, such as the bowels.  You have heavy bleeding from the site.  If this happens, hold pressure on the site and call 911.        Make Sure You:  Understand these instructions.  Will watch your condition.  Will get help right away if you are not doing well or get worse.

## 2024-04-22 NOTE — Clinical Note
The DP pulses are +1 bilaterally. The PT pulses are +1 bilaterally. Heel assessment: Skin purple, dry.  No other signs of breakdown.  Heels elevated on foam pillow to relieve pressure. Cocoon on at 43° C.

## 2024-04-22 NOTE — Clinical Note
Hemostasis started on the left femoral vein. Vascade MVP was used in achieving hemostasis. Closure device deployed in the vessel and manual pressure applied to vessel. Manual pressure was held by MARCELLE Ashley RTR. Manual pressure was held for 10 min. Hemostasis achieved successfully. Closure device additional comment: Vascade #4, pressure held, gauze and tegaderm

## 2024-04-22 NOTE — Clinical Note
Hemostasis started on the right femoral vein. Vascade MVP was used in achieving hemostasis. Closure device deployed in the vessel and manual pressure applied to vessel. Manual pressure was held by MARCELLE Ashley RTR. Manual pressure was held for 10 min. Hemostasis achieved successfully. Closure device additional comment: Vascade #2, pressure held, gauze and tegaderm

## 2024-04-22 NOTE — Clinical Note
Hemostasis started on the right femoral vein. Vascade MVP was used in achieving hemostasis. Closure device deployed in the vessel and manual pressure applied to vessel. Manual pressure was held by MARCELLE Ashley RTR. Manual pressure was held for 10 min. Hemostasis achieved successfully. Closure device additional comment: Vascade #1, pressure held, gauze and tegaderm

## 2024-04-22 NOTE — ANESTHESIA PREPROCEDURE EVALUATION
Anesthesia Evaluation     Patient summary reviewed and Nursing notes reviewed   NPO Solid Status: > 8 hours  NPO Liquid Status: > 2 hours           Airway   Mallampati: II  TM distance: >3 FB  Neck ROM: full  No difficulty expected  Dental - normal exam     Pulmonary - normal exam   (+) ,shortness of breath, sleep apnea on CPAP  Cardiovascular - normal exam  Exercise tolerance: good (4-7 METS)    ECG reviewed  Patient on routine beta blocker and Beta blocker given within 24 hours of surgery    (+) hypertension, past MI , CAD, dysrhythmias Atrial Fib, CHF Systolic <55%, hyperlipidemia      Neuro/Psych- negative ROS  GI/Hepatic/Renal/Endo    (+) morbid obesity, diabetes mellitus type 2, thyroid problem hypothyroidism    Musculoskeletal     Abdominal    Substance History - negative use     OB/GYN          Other   arthritis,         Phys Exam Other: goatee            Anesthesia Plan    ASA 3     general     intravenous induction     Anesthetic plan, risks, benefits, and alternatives have been provided, discussed and informed consent has been obtained with: patient.    CODE STATUS:

## 2024-04-22 NOTE — ANESTHESIA PROCEDURE NOTES
Airway  Urgency: elective    Date/Time: 4/22/2024 11:50 AM  Airway not difficult    General Information and Staff    Patient location during procedure: OR  Anesthesiologist: Luis Angel Coyle MD  CRNA/CAA: Amy Medina CRNA    Indications and Patient Condition  Indications for airway management: airway protection    Preoxygenated: yes  MILS not maintained throughout  Mask difficulty assessment: 2 - vent by mask + OA or adjuvant +/- NMBA    Final Airway Details  Final airway type: endotracheal airway      Successful airway: ETT  Cuffed: yes   Successful intubation technique: direct laryngoscopy  Facilitating devices/methods: intubating stylet and anterior pressure/BURP  Endotracheal tube insertion site: oral  Blade: Cruz  Blade size: 2  ETT size (mm): 7.5  Cormack-Lehane Classification: grade I - full view of glottis  Placement verified by: chest auscultation and capnometry   Cuff volume (mL): 7  Measured from: teeth  ETT/EBT  to teeth (cm): 23  Number of attempts at approach: 1  Assessment: lips, teeth, and gum same as pre-op and atraumatic intubation    Additional Comments  Airway exam prior to DL, teeth/lips inspected. Preoxygenated with 100% O2; sniffing position, IV induction, eyes taped. Easy mask ventilation w/ OA. Atraumatic intubation.Grade I view noted w/ anterior pressure, Grade IIa without pressure. ETT connected to vent. Confirmed EBBS, +EtCO2. Lips and teeth inspected, intact, no damage.

## 2024-04-22 NOTE — ANESTHESIA POSTPROCEDURE EVALUATION
"Patient: Marcus Simon    Procedure Summary       Date: 04/22/24 Room / Location: [facility] /  [facility]    Anesthesia Start: 1130 Anesthesia Stop: 1422    Procedures:       Ablation atrial fibrillation      3D Mapping EP Diagnosis:       Atrial fibrillation, persistent      (persistent AF)    Providers: Ben Adams MD Provider: Luis Angel Coyle MD    Anesthesia Type: general ASA Status: 3            Anesthesia Type: general    Vitals  Vitals Value Taken Time   /77 04/22/24 1445   Temp     Pulse 72 04/22/24 1456   Resp     SpO2 95 % 04/22/24 1456   Vitals shown include unfiled device data.        Post Anesthesia Care and Evaluation    Patient location during evaluation: PACU  Level of consciousness: awake  Pain management: adequate    Airway patency: patent  Anesthetic complications: No anesthetic complications  PONV Status: controlled  Cardiovascular status: acceptable  Respiratory status: acceptable  Hydration status: acceptable    Comments: /91 (BP Location: Right arm, Patient Position: Sitting)   Pulse 98   Temp 36.8 °C (98.3 °F) (Temporal)   Resp 18   Ht 193 cm (76\")   Wt (!) 154 kg (340 lb)   SpO2 95%   BMI 41.39 kg/m²       "

## 2024-04-22 NOTE — Clinical Note
Hemostasis started on the left femoral vein. Vascade MVP was used in achieving hemostasis. Closure device deployed in the vessel and manual pressure applied to vessel. Manual pressure was held by MARCELLE Ashley RTR. Manual pressure was held for 10 min. Hemostasis achieved successfully. Closure device additional comment: Vascade #3, pressure held, gauze and tegaderm

## 2024-04-23 ENCOUNTER — TELEPHONE (OUTPATIENT)
Dept: CARDIOLOGY | Facility: CLINIC | Age: 59
End: 2024-04-23
Payer: COMMERCIAL

## 2024-04-23 NOTE — TELEPHONE ENCOUNTER
Caller: Marcus Simon    Relationship: Self    Best call back number: [phone]    What form or medical record are you requesting: RETURN TO WORK AFTER PROCEDURE ON 4.22.24    Who is requesting this form or medical record from you: PATIENT AND EMPLOYER    How would you like to receive the form or medical records (pick-up, mail, fax): EMAIL TO PT, HE STATES WE DID THIS LAST TIME. EMAIL IS [email or web address]      Timeframe paperwork needed: ASAP    Additional notes: PT WAS SUPPOSED TO GET THE RETURN TO WORK YESTERDAY BEFORE LEAVING BUT REALIZED HE NEVER DID. RETURN TO WORK WAS SUPPOSED TO BE FORE THIS WEDNESDAY 4.24.24 SO PT IS NEEDING THIS ASAP TO GIVE TO HIS EMPLOYER.

## 2024-04-24 ENCOUNTER — TELEPHONE (OUTPATIENT)
Dept: CARDIOLOGY | Facility: CLINIC | Age: 59
End: 2024-04-24
Payer: COMMERCIAL

## 2024-04-24 NOTE — TELEPHONE ENCOUNTER
"  Caller: BRENDA    Relationship: SELF    Best call back number: [phone]    What is the best time to reach you: ANY        What was the call regarding: PT HAD A NOTE  DONE TO RETURN TO WORK FOR \"[employer]\"  THAT IS NOT AN OPTION AT HIS EMPLOYMENT SO THEY ARE REQUESTING ANOTHER NOTE.      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     SET TO HIGH PRIORITY BECAUSE THERE IS A CHANCE HE COULD GET SENT HOME TODAY WITHOUT IT.    "

## 2024-04-24 NOTE — TELEPHONE ENCOUNTER
Patient called back stating that he needs a RTW letter with NO restrictions.    Noah spoke with patient on the phone and is agreeable.    RTW letter with NO restrictions generated in Epic and emailed to patient per his request.

## 2024-05-15 ENCOUNTER — OFFICE VISIT (OUTPATIENT)
Dept: SLEEP MEDICINE | Facility: HOSPITAL | Age: 59
End: 2024-05-15
Payer: COMMERCIAL

## 2024-05-15 VITALS — HEART RATE: 98 BPM | WEIGHT: 315 LBS | OXYGEN SATURATION: 94 % | HEIGHT: 76 IN | BODY MASS INDEX: 38.36 KG/M2

## 2024-05-15 DIAGNOSIS — G47.34 NOCTURNAL HYPOXIA: ICD-10-CM

## 2024-05-15 DIAGNOSIS — R29.818 SUSPECTED SLEEP APNEA: Primary | ICD-10-CM

## 2024-05-15 DIAGNOSIS — I48.91 ATRIAL FIBRILLATION, UNSPECIFIED TYPE: ICD-10-CM

## 2024-05-15 DIAGNOSIS — E66.01 SEVERE OBESITY (BMI >= 40): ICD-10-CM

## 2024-05-15 DIAGNOSIS — I50.20 HEART FAILURE WITH REDUCED EJECTION FRACTION: ICD-10-CM

## 2024-05-15 PROCEDURE — G0463 HOSPITAL OUTPT CLINIC VISIT: HCPCS

## 2024-05-15 NOTE — PROGRESS NOTES
[facility]  [address]  [place] [address]  Phone   Fax       Marcus Simon  [id number]   1965  59 y.o.  male      Referring Provider: Wilfred Fernandes DO  PCP: Jackson Vázquez MD    Type of service: Initial Sleep Medicine Consult  Date of service: 5/15/2024          CHIEF COMPLAINT: Suspected sleep apnea      HISTORY OF PRESENT ILLNESS:  Thank you for asking us to see Marcus Simon.  Marcus Simon 59 y.o. was seen today on 5/15/2024 at [facility].  Patient presents today for suspected sleep apnea with history of nocturnal hypoxia, waking up gasping for breath at times, daytime fatigue.  The symptoms are chronic in nature.  Patient has history of tonsillectomy around 1971.  He has been following with cardiology for atrial fibrillation and cardiomyopathy.  He was noted to have nocturnal hypoxia during recent hospitalization.  Was not felt to require oxygen at discharge and does not use any nocturnal oxygen at home.  However outpatient sleep study was recommended to rule out sleep apnea.          SLEEP HISTORY:  Sleep schedule:  Bedtime: 10 to 10:30 PM  Wake time: 6:05 AM weekdays, 9 AM weekends  Time it takes to fall asleep: 15 to 60 minutes  Average hours of sleep: 7-8 weeknights, 8-10 weekends  Number of naps per day: 1 on weekdays, 2 on weekends    Symptoms:   In addition to the above, patient reports the following associated symptoms:  Have you ever awakened gasping for breath, coughing, choking: Yes   Change in weight:  No  Morning headaches:  No   Awaken with a sore throat or dry mouth:  Yes   Leg jerking at night:  No   Creepy crawly feeling in legs/urge to move legs: No   Teeth grinding: No   Have you ever awakened at night with a sour taste or burning sensation in your chest:  No   Do you have muscle weakness with laughing or anger:  No   Have you ever felt paralyzed while going to sleep or waking up:  No   Sleepwalking: No  "  Nightmares: No   Nocturia (urination at night): 2 times per night  Memory Problem:      Medical Conditions (PMH):   Heart failure/cardiomyopathy  A-fib  Hypothyroidism  Diabetes    Social history:  Do you drive a commercial vehicle:  No   Shift work:  No   Tobacco use:  No   Alcohol use: 0 per week  Caffeinated drinks: 0 per day  Occupation:     Family History (parents and siblings) (pertaining to sleep medicine):  Obesity    Medications: reviewed    Allergies:  Sulfa antibiotics and Penicillins      REVIEW OF SYSTEMS:  Pertinent positive symptoms are:  [place] Sleepiness Scale of Total score: 5   Fatigue  Irregular heartbeat  Anxiety  Postnasal drip        PHYSICAL EXAM:  CONSTITUTINONAL:   Vitals:    05/15/24 1100   Pulse: 98   SpO2: 94%   Weight: (!) 156 kg (343 lb)   Height: 193 cm (76\")    Body mass index is 41.75 kg/m².   HR 98 bpm (ranging  bpm), asymptomatic, he will monitor at home and follow with cardiology.  Reports at home HR is staying below 100 bpm.      HEAD: atraumatic, normocephalic   NECK: Neck Circumference: 19.5 inches, trachea is midline  RESPIRATORY SYSTEM: Respirations even, unlabored, normal rate  CARDIOVASULAR SYSTEM: Normal rate  NEUROLOGICAL SYSTEM: Alert and oriented x 3  PSYCHIATRIC SYSTEM: Mood is normal/ appropriate     Office note(s) from care team reviewed. Office note(s) reviewed: 4/4/2024 cardiology note, 4/10/2024 discharge summary    Labs/ Test Results Reviewed:  TSH          4/8/2024    04:37   TSH   TSH 1.290       Most Recent A1C          4/8/2024    04:37   HGBA1C Most Recent   Hemoglobin A1C 6.20               ASSESSMENT AND PLAN:   Nocturnal hypoxia, suspected sleep apnea: patient's symptoms and physical examination are concerning for possible sleep apnea.   I discussed the signs, symptoms, and pathophysiology of sleep apnea with this patient.  I also discussed the possible complications of untreated sleep apnea including but not limited to " potential risk of resistant hypertension, insulin resistance, pulmonary hypertension, atrial fibrillation or other arrhythmias, heart attack, stroke, nonrestorative sleep with hypersomnia which can increase risk for motor vehicle accidents, etc.   Different testing methods including home-based and lab based sleep studies were discussed with this patient.   Recommended in lab polysomnogram, split-night protocol if patient meets criteria for it.  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  Patient aware of potential limitations of home study however we will start with home study and see if we can get him treated for sleep apnea this way.  If no evidence of sleep apnea on home study, he would be amenable to trying to see if one of his kids can come stay with his wife for night though they do not live close by and have other things going on right now.  The order for the sleep study is placed in [facility].  The test will be scheduled after prior authorization has been obtained through patient's insurance.  Discussed overview of treatment options for sleep apnea in the office today including PAP therapy, and treatment/ management will be discussed in more detail with this patient after the test is completed.  All questions were answered to patient's satisfaction.   Daytime fatigue/ sleepiness:  [place] Sleepiness Scale of Total score: 5.  There are many causes of daytime sleepiness and/or fatigue.  Rule out sleep apnea as a contributing factor, as above.  Do not drive, operate heavy machinery, or do activities that require high concentration if feeling tired/drowsy.  Severe obesity: Body mass index is 41.75 kg/m².. Patients who are overweight or obese are at increased risk of sleep apnea/ sleep disordered breathing. Weight reduction and healthy lifestyle are encouraged in overweight/ obese patients as part of a comprehensive approach to  sleep apnea treatment.    Atrial fibrillation status post PVI, status post hybrid ablation 3/2024  HFrEF  Hypertension  Prediabetes  Hyperlipidemia  Hypothyroidism    I have also discussed with the patient the following  Sleep hygiene: try to maintain a regular bed time and wake time, avoid watching TV/ using electronic devices in bed (including cell phones), limit caffeinated and alcoholic beverages before bed, try to maintain a cool and quiet sleep environment, avoid daytime naps  Adequate amount of sleep: most people need around 7 to 8 hours of sleep each night        Patient will follow-up after study, 31 to 90 days after PAP therapy initiated if applicable, or contact the office sooner for questions or concerns. Patient's questions were answered.            Thank you again for asking me to consult on this patient.  Please do not hesitate to call me if you have additional questions or concerns.       Yris Cruz DNP, APRN  [facility]

## 2024-05-22 ENCOUNTER — OFFICE VISIT (OUTPATIENT)
Age: 59
End: 2024-05-22
Payer: COMMERCIAL

## 2024-05-22 VITALS
SYSTOLIC BLOOD PRESSURE: 124 MMHG | WEIGHT: 315 LBS | DIASTOLIC BLOOD PRESSURE: 72 MMHG | HEART RATE: 115 BPM | BODY MASS INDEX: 38.36 KG/M2 | HEIGHT: 76 IN

## 2024-05-22 DIAGNOSIS — Z98.890 H/O CARDIAC RADIOFREQUENCY ABLATION: ICD-10-CM

## 2024-05-22 DIAGNOSIS — E78.00 PURE HYPERCHOLESTEROLEMIA: ICD-10-CM

## 2024-05-22 DIAGNOSIS — I48.19 PERSISTENT ATRIAL FIBRILLATION: Primary | ICD-10-CM

## 2024-05-22 DIAGNOSIS — I50.20 HEART FAILURE WITH REDUCED EJECTION FRACTION, NYHA CLASS II: ICD-10-CM

## 2024-05-22 PROBLEM — I50.23 ACUTE ON CHRONIC SYSTOLIC HEART FAILURE: Status: RESOLVED | Noted: 2023-03-10 | Resolved: 2024-05-22

## 2024-05-22 PROBLEM — R94.39 ABNORMAL NUCLEAR STRESS TEST: Status: RESOLVED | Noted: 2022-10-06 | Resolved: 2024-05-22

## 2024-05-22 PROBLEM — I48.21 PERMANENT ATRIAL FIBRILLATION: Status: RESOLVED | Noted: 2021-03-11 | Resolved: 2024-05-22

## 2024-05-22 PROBLEM — I50.21 ACUTE SYSTOLIC HEART FAILURE: Status: RESOLVED | Noted: 2023-03-10 | Resolved: 2024-05-22

## 2024-05-22 PROBLEM — I48.91 ATRIAL FIBRILLATION WITH RVR: Status: RESOLVED | Noted: 2024-04-07 | Resolved: 2024-05-22

## 2024-05-22 PROBLEM — I48.91 ATRIAL FIBRILLATION: Status: RESOLVED | Noted: 2024-03-21 | Resolved: 2024-05-22

## 2024-05-22 NOTE — PROGRESS NOTES
ELECTROPHYSIOLOGY   Date of Office Visit: 2024  Patient Name: Marcus Simon  : 1965  Encounter Provider: Austin Baxter PA-C  Primary Cardiologist: Dr. Cowan  Electrophysiologist: Dr. Adams  CHIEF COMPLAINT / REASON FOR OFFICE VISIT     persistent AFIB and s/p ablation   Hospital Follow up    HISTORY OF PRESENT ILLNESS     This is a 59 y.o. year old male who presents to [facility] for a for  follow up from their recent inpatient hospitalization.    He has a long standing hx of persistent atrial fibrillation and was admitted to start dofetlide 2023. At that time he had a CV with ERAF and needed repeat CV.     He had a subsequent PVI ablation 2023 and post ablation had recurrent AF. He had a CV while on dofetilide 2023.     He was referred for a hybrid ablation 2023 after have recurrent AF despite ADD tx.     S/p epicardial ablation of the posterior wall of the LA and ANA occlusion with atrial clip on 3/21/2024.     Post procedure, he had increasing tachycardia and a small pericardial effusion with fatigue and was treated in the hospital. He was started on short acting cardizem and atenolol with plans to bridge to OP ablation. He was also suspected to have possible pericarditis and placed on colchicine.     S/p PVI ablation with posterior wall isolation and ablation of typical flutter 2024.     Today the patient reports he has been feeling ok overall. He did note that around 3 weeks after the ablation he started feeling tired and fatigued. He has noticed when going up flights of stairs he is getting breathless.     He denies any overt palpitations, dizziness or near syncope.     He had not been taking home BP or Hrs.    He is back in AF today on EKG with HR of 115, on auscultation he was down in the 90s after sitting for a while.     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     He has been following with sleep medicine  "and had a sleep study planned for 5/28/2024.    PMHx:  Persistent atrial fibrillation, HTN, HL, GIB, DM type II, obesity, TIA, CAD    PHYSICAL EXAMINATION     Vital Signs:  /72   Pulse 115   Ht 193 cm (76\")   Wt (!) 157 kg (346 lb)   BMI 42.12 kg/m²   Estimated body mass index is 42.12 kg/m² as calculated from the following:    Height as of this encounter: 193 cm (76\").    Weight as of this encounter: 157 kg (346 lb).               Physical Exam  Constitutional:       Appearance: Normal appearance. He is obese.   HENT:      Head: Normocephalic and atraumatic.   Cardiovascular:      Rate and Rhythm: Normal rate. Rhythm irregular.      Heart sounds: No murmur heard.  Pulmonary:      Effort: Pulmonary effort is normal.      Breath sounds: Normal breath sounds.   Musculoskeletal:      Right lower leg: No edema.      Left lower leg: No edema.   Skin:     General: Skin is warm and dry.   Neurological:      General: No focal deficit present.      Mental Status: He is alert and oriented to person, place, and time.          Cardiac Testing/Results     Cardiac Testing:   - Echo 4/8/2024: EF 41-45% with mildly decreased LV function. Mild to moderate dilation of RV. Mild LA and RA dilation. Mild calcification on AV. RVSP < 35 mmHg    Result Review :  The following data was reviewed by: Austin Baxter PA-C on 05/22/2024:       Lab Results   Component Value Date     04/10/2024     04/09/2024    K 4.1 04/10/2024    K 4.2 04/09/2024     04/10/2024     04/09/2024    CO2 29.3 (H) 04/10/2024    CO2 25.1 04/09/2024    BUN 11 04/10/2024    BUN 11 04/09/2024    CREATININE 0.53 (L) 04/10/2024    CREATININE 0.58 (L) 04/09/2024    EGFRIFAFRI >60 11/25/2020    GLUCOSE 115 (H) 04/10/2024    GLUCOSE 109 (H) 04/09/2024    CALCIUM 9.2 04/10/2024    CALCIUM 8.7 04/09/2024    ALBUMIN 3.5 04/08/2024    ALBUMIN 4.2 04/07/2024    AST 13 04/08/2024    AST 15 04/07/2024    ALT 19 04/08/2024    ALT 25 04/07/2024 "     Lab Results   Component Value Date    WBC 4.89 04/10/2024    WBC 5.48 04/09/2024    HGB 13.7 04/10/2024    HGB 12.8 (L) 04/09/2024    HCT 41.0 04/10/2024    HCT 38.2 04/09/2024    .5 (H) 04/10/2024    .5 (H) 04/09/2024     04/10/2024     04/09/2024     Lab Results   Component Value Date    PROBNP 2,075.0 (H) 04/07/2024     Lab Results   Component Value Date    TROPONINT 36 (H) 04/07/2024     Lab Results   Component Value Date    TSH 1.290 04/08/2024    TSH 2.450 11/25/2020                 ECG 12 Lead    Date/Time: 5/22/2024 10:25 AM  Performed by: Austin Michel PA-C    Authorized by: Austin Michel PA-C  Comparison: compared with previous ECG from 4/22/2024  Rhythm: atrial fibrillation  Rate: tachycardic  BPM: 115  QRS axis: normal  Comments: Qtc 504                ASSESSMENT & PLAN       Diagnoses and all orders for this visit:    1. Persistent atrial fibrillation (Primary)  Seen to be in atrial fibrillation today with HR into the 110s. He did not around 1 week ago starting to have increasing fatigue and dyspnea on exertion   Hx of multiple failed CV in the past and he was on Tikosyn. This is d/c after ablation as well as Cardizem  Continue atenolol 50 mg twice daily  Continue Eliquis 5 mg twice daily, denies bleeding complications  Discussed weight loss and him following with sleep clinic to evaluate for GLADYS next week  Will plan on a 7 day zio patch to evaluate atrial fibrillation burden. Will discuss further plans with Dr. Adams post monitor.   2. s/p converent abalation 3/2024 with PVI/posterior wall islation 4/2024  S/p epicardial ablation of the posterior wall of the LA and ANA occlusion with atrial clip on 3/21/2024.  S/pPVI ablation with posterior wall isolation and ablation of typical flutter 4/22/2024.  3. Heart failure with reduced ejection fraction, NYHA class II  Evuolemic on exam today, continue furosemide and potassium  Continue atenolol   4. Pure  hypercholesterolemia  Remote hx of CAD, he will follow with primary cardiologist in this regard      Follow Up:  Return in about 4 weeks (around 6/19/2024) for Dr. Berumen.  Patient was given instructions and counseling regarding his condition or for health maintenance advice. Please contact office if worsening symptoms or proceed to ER when appropriate.      Austin Baxter PA-C  05/22/24  11:24 EDT    MEDICATIONS         Discharge Medications            Accurate as of May 22, 2024 11:24 AM. If you have any questions, ask your nurse or doctor.                Continue These Medications        Instructions Start Date   atenolol 50 MG tablet  Commonly known as: TENORMIN   50 mg, Oral, Every 12 Hours Scheduled      atorvastatin 10 MG tablet  Commonly known as: LIPITOR   10 mg, Oral, Nightly      Eliquis 5 MG tablet tablet  Generic drug: apixaban   5 mg, Oral, 2 Times Daily      ferrous gluconate 324 MG tablet  Commonly known as: FERGON   324 mg, Oral, 2 Times Daily      furosemide 20 MG tablet  Commonly known as: Lasix   20 mg, Oral, 2 Times Daily      gabapentin 300 MG capsule  Commonly known as: NEURONTIN   300 mg, Oral, 2 Times Daily      levothyroxine 150 MCG tablet  Commonly known as: SYNTHROID, LEVOTHROID   150 mcg, Oral, Daily      metFORMIN 500 MG tablet  Commonly known as: GLUCOPHAGE   500 mg, Oral, Daily With Breakfast      Ozempic (0.25 or 0.5 MG/DOSE) 2 MG/1.5ML solution pen-injector  Generic drug: Semaglutide(0.25 or 0.5MG/DOS)   Subcutaneous, Weekly      Vitamin D (Cholecalciferol) 10 MCG (400 UNIT) tablet  Commonly known as: CHOLECALCIFEROL   400 Units, Oral, Daily      vitamin E 100 UNIT capsule   100 Units, Oral, Daily, HOLD PER MD INSTR                   **Katherine Disclaimer: This note was dictated using an electronic transcription. The electronic translation of spoken language may permit erroneous, or at times, nonsensical words or phrases to be inadvertently transcribed. Although I have  reviewed the note for such errors, some may still exist.

## 2024-05-28 ENCOUNTER — HOSPITAL ENCOUNTER (OUTPATIENT)
Dept: SLEEP MEDICINE | Facility: HOSPITAL | Age: 59
End: 2024-05-28
Payer: COMMERCIAL

## 2024-05-28 DIAGNOSIS — I50.20 HEART FAILURE WITH REDUCED EJECTION FRACTION: ICD-10-CM

## 2024-05-28 DIAGNOSIS — G47.34 NOCTURNAL HYPOXIA: ICD-10-CM

## 2024-05-28 DIAGNOSIS — I48.91 ATRIAL FIBRILLATION, UNSPECIFIED TYPE: ICD-10-CM

## 2024-05-28 DIAGNOSIS — E66.01 SEVERE OBESITY (BMI >= 40): ICD-10-CM

## 2024-05-28 DIAGNOSIS — R29.818 SUSPECTED SLEEP APNEA: ICD-10-CM

## 2024-05-28 PROCEDURE — 95806 SLEEP STUDY UNATT&RESP EFFT: CPT

## 2024-06-05 ENCOUNTER — CLINICAL SUPPORT (OUTPATIENT)
Dept: CARDIOLOGY | Facility: CLINIC | Age: 59
End: 2024-06-05
Payer: COMMERCIAL

## 2024-06-05 DIAGNOSIS — I50.20 HEART FAILURE WITH REDUCED EJECTION FRACTION, NYHA CLASS II: ICD-10-CM

## 2024-06-05 DIAGNOSIS — I48.19 PERSISTENT ATRIAL FIBRILLATION: Primary | ICD-10-CM

## 2024-06-05 PROCEDURE — 93000 ELECTROCARDIOGRAM COMPLETE: CPT

## 2024-06-05 RX ORDER — FUROSEMIDE 20 MG/1
40 TABLET ORAL 2 TIMES DAILY
Start: 2024-06-05

## 2024-06-05 RX ORDER — AMIODARONE HYDROCHLORIDE 200 MG/1
200 TABLET ORAL 2 TIMES DAILY
Qty: 60 TABLET | Refills: 2 | Status: SHIPPED | OUTPATIENT
Start: 2024-06-05

## 2024-06-05 NOTE — H&P (VIEW-ONLY)
Procedure     ECG 12 Lead    Date/Time: 6/5/2024 12:04 PM  Performed by: Noah Aggarwal APRN    Authorized by: Noah Aggarwal APRN  Comparison: compared with previous ECG   Similar to previous ECG  Rhythm: atrial fibrillation  Comments: He came in for EKG with complaints of dyspnea on exertion. He is back in AF and appears to have been for about the past month. Suspect he is having some HF due to tachycardia. He does not have any edema. Biggest complaint is dyspnea which is similar to his AF in the past. Will increase lasix 40mg in AM and 20mg in PM.     Will add amiodarone 200mg BID and cardiovert him next week. We discussed risks, benefits, and alternatives.

## 2024-06-06 DIAGNOSIS — G47.33 OSA (OBSTRUCTIVE SLEEP APNEA): Primary | ICD-10-CM

## 2024-06-06 DIAGNOSIS — R06.83 SNORING: ICD-10-CM

## 2024-06-07 ENCOUNTER — TRANSCRIBE ORDERS (OUTPATIENT)
Dept: CARDIOLOGY | Facility: CLINIC | Age: 59
End: 2024-06-07
Payer: COMMERCIAL

## 2024-06-07 ENCOUNTER — TELEPHONE (OUTPATIENT)
Dept: SLEEP MEDICINE | Facility: HOSPITAL | Age: 59
End: 2024-06-07
Payer: COMMERCIAL

## 2024-06-07 DIAGNOSIS — Z13.6 SCREENING FOR ISCHEMIC HEART DISEASE: ICD-10-CM

## 2024-06-07 DIAGNOSIS — Z01.810 PRE-OPERATIVE CARDIOVASCULAR EXAMINATION: Primary | ICD-10-CM

## 2024-06-07 NOTE — TELEPHONE ENCOUNTER
Spoke with pt about results and faxed order to [facility] per pts request. Pt will schedule a f/u once he gets his machine.

## 2024-06-18 ENCOUNTER — LAB (OUTPATIENT)
Dept: LAB | Facility: HOSPITAL | Age: 59
End: 2024-06-18
Payer: COMMERCIAL

## 2024-06-18 DIAGNOSIS — Z01.810 PRE-OPERATIVE CARDIOVASCULAR EXAMINATION: ICD-10-CM

## 2024-06-18 DIAGNOSIS — Z13.6 SCREENING FOR ISCHEMIC HEART DISEASE: ICD-10-CM

## 2024-06-18 LAB
ANION GAP SERPL CALCULATED.3IONS-SCNC: 15.1 MMOL/L (ref 5–15)
BASOPHILS # BLD AUTO: 0.04 10*3/MM3 (ref 0–0.2)
BASOPHILS NFR BLD AUTO: 0.7 % (ref 0–1.5)
BUN SERPL-MCNC: 14 MG/DL (ref 6–20)
BUN/CREAT SERPL: 15.9 (ref 7–25)
CALCIUM SPEC-SCNC: 9.2 MG/DL (ref 8.6–10.5)
CHLORIDE SERPL-SCNC: 103 MMOL/L (ref 98–107)
CO2 SERPL-SCNC: 24.9 MMOL/L (ref 22–29)
CREAT SERPL-MCNC: 0.88 MG/DL (ref 0.76–1.27)
DEPRECATED RDW RBC AUTO: 45.8 FL (ref 37–54)
EGFRCR SERPLBLD CKD-EPI 2021: 99.7 ML/MIN/1.73
EOSINOPHIL # BLD AUTO: 0.14 10*3/MM3 (ref 0–0.4)
EOSINOPHIL NFR BLD AUTO: 2.3 % (ref 0.3–6.2)
ERYTHROCYTE [DISTWIDTH] IN BLOOD BY AUTOMATED COUNT: 12.2 % (ref 12.3–15.4)
GLUCOSE SERPL-MCNC: 146 MG/DL (ref 65–99)
HCT VFR BLD AUTO: 48 % (ref 37.5–51)
HGB BLD-MCNC: 15.3 G/DL (ref 13–17.7)
IMM GRANULOCYTES # BLD AUTO: 0.01 10*3/MM3 (ref 0–0.05)
IMM GRANULOCYTES NFR BLD AUTO: 0.2 % (ref 0–0.5)
LYMPHOCYTES # BLD AUTO: 1.36 10*3/MM3 (ref 0.7–3.1)
LYMPHOCYTES NFR BLD AUTO: 22.8 % (ref 19.6–45.3)
MCH RBC QN AUTO: 32.6 PG (ref 26.6–33)
MCHC RBC AUTO-ENTMCNC: 31.9 G/DL (ref 31.5–35.7)
MCV RBC AUTO: 102.1 FL (ref 79–97)
MONOCYTES # BLD AUTO: 0.65 10*3/MM3 (ref 0.1–0.9)
MONOCYTES NFR BLD AUTO: 10.9 % (ref 5–12)
NEUTROPHILS NFR BLD AUTO: 3.77 10*3/MM3 (ref 1.7–7)
NEUTROPHILS NFR BLD AUTO: 63.1 % (ref 42.7–76)
NRBC BLD AUTO-RTO: 0 /100 WBC (ref 0–0.2)
PLATELET # BLD AUTO: 192 10*3/MM3 (ref 140–450)
PMV BLD AUTO: 9.9 FL (ref 6–12)
POTASSIUM SERPL-SCNC: 3.8 MMOL/L (ref 3.5–5.2)
RBC # BLD AUTO: 4.7 10*6/MM3 (ref 4.14–5.8)
SODIUM SERPL-SCNC: 143 MMOL/L (ref 136–145)
WBC NRBC COR # BLD AUTO: 5.97 10*3/MM3 (ref 3.4–10.8)

## 2024-06-18 PROCEDURE — 36415 COLL VENOUS BLD VENIPUNCTURE: CPT

## 2024-06-18 PROCEDURE — 80048 BASIC METABOLIC PNL TOTAL CA: CPT

## 2024-06-18 PROCEDURE — 85025 COMPLETE CBC W/AUTO DIFF WBC: CPT

## 2024-06-19 ENCOUNTER — HOSPITAL ENCOUNTER (OUTPATIENT)
Dept: CARDIOLOGY | Facility: HOSPITAL | Age: 59
Discharge: HOME | End: 2024-06-19
Payer: COMMERCIAL

## 2024-06-19 VITALS
SYSTOLIC BLOOD PRESSURE: 146 MMHG | HEIGHT: 76 IN | DIASTOLIC BLOOD PRESSURE: 103 MMHG | WEIGHT: 315 LBS | BODY MASS INDEX: 38.36 KG/M2 | TEMPERATURE: 96.1 F | RESPIRATION RATE: 18 BRPM | HEART RATE: 87 BPM | OXYGEN SATURATION: 93 %

## 2024-06-19 DIAGNOSIS — I48.19 PERSISTENT ATRIAL FIBRILLATION: ICD-10-CM

## 2024-06-19 DIAGNOSIS — I48.19 PERSISTENT ATRIAL FIBRILLATION: Primary | ICD-10-CM

## 2024-06-19 DIAGNOSIS — I50.20 HEART FAILURE WITH REDUCED EJECTION FRACTION, NYHA CLASS II: ICD-10-CM

## 2024-06-19 LAB
GLUCOSE BLDC GLUCOMTR-MCNC: 125 MG/DL (ref 70–130)
QT INTERVAL: 383 MS
QTC INTERVAL: 537 MS

## 2024-06-19 PROCEDURE — 25810000003 SODIUM CHLORIDE 0.9 % SOLUTION: Performed by: INTERNAL MEDICINE

## 2024-06-19 PROCEDURE — 93005 ELECTROCARDIOGRAM TRACING: CPT | Performed by: INTERNAL MEDICINE

## 2024-06-19 PROCEDURE — 82948 REAGENT STRIP/BLOOD GLUCOSE: CPT

## 2024-06-19 PROCEDURE — 92960 CARDIOVERSION ELECTRIC EXT: CPT

## 2024-06-19 RX ORDER — METHOHEXITAL IN WATER/PF 100MG/10ML
SYRINGE (ML) INTRAVENOUS
Status: COMPLETED | OUTPATIENT
Start: 2024-06-19 | End: 2024-06-19

## 2024-06-19 RX ORDER — SODIUM CHLORIDE 0.9 % (FLUSH) 0.9 %
10 SYRINGE (ML) INJECTION EVERY 12 HOURS SCHEDULED
Status: DISCONTINUED | OUTPATIENT
Start: 2024-06-19 | End: 2024-06-20 | Stop reason: HOSPADM

## 2024-06-19 RX ORDER — SODIUM CHLORIDE 9 MG/ML
75 INJECTION, SOLUTION INTRAVENOUS CONTINUOUS
Status: DISCONTINUED | OUTPATIENT
Start: 2024-06-19 | End: 2024-06-20 | Stop reason: HOSPADM

## 2024-06-19 RX ORDER — AMIODARONE HYDROCHLORIDE 200 MG/1
400 TABLET ORAL 3 TIMES DAILY
Qty: 180 TABLET | Refills: 0 | Status: SHIPPED | OUTPATIENT
Start: 2024-06-19

## 2024-06-19 RX ADMIN — SODIUM CHLORIDE 75 ML/HR: 9 INJECTION, SOLUTION INTRAVENOUS at 10:40

## 2024-06-19 RX ADMIN — Medication 20 MG: at 11:04

## 2024-06-19 RX ADMIN — Medication 60 MG: at 11:01

## 2024-06-19 NOTE — LETTER
June 19, 2024        Please excuse Marcus Simon from work today, June 19, 2024.  He was at [facility] undergoing a cardiac procedure.  Due to the nature of the procedure he will need to be off of work on June 20, 2024.  He is cleared to return to work without restrictions on Friday, June 21, 2024.      Thank you,      [facility] Cardiac [facility]

## 2024-06-26 ENCOUNTER — HOSPITAL ENCOUNTER (OUTPATIENT)
Dept: CARDIOLOGY | Facility: HOSPITAL | Age: 59
Discharge: HOME | End: 2024-06-26
Admitting: INTERNAL MEDICINE
Payer: COMMERCIAL

## 2024-06-26 VITALS
BODY MASS INDEX: 38.36 KG/M2 | WEIGHT: 315 LBS | SYSTOLIC BLOOD PRESSURE: 122 MMHG | HEIGHT: 76 IN | RESPIRATION RATE: 16 BRPM | TEMPERATURE: 96.7 F | DIASTOLIC BLOOD PRESSURE: 80 MMHG | HEART RATE: 67 BPM | OXYGEN SATURATION: 90 %

## 2024-06-26 DIAGNOSIS — I48.19 PERSISTENT ATRIAL FIBRILLATION: ICD-10-CM

## 2024-06-26 LAB — GLUCOSE BLDC GLUCOMTR-MCNC: 110 MG/DL (ref 70–130)

## 2024-06-26 PROCEDURE — 92960 CARDIOVERSION ELECTRIC EXT: CPT

## 2024-06-26 PROCEDURE — 82948 REAGENT STRIP/BLOOD GLUCOSE: CPT

## 2024-06-26 PROCEDURE — 25810000003 SODIUM CHLORIDE 0.9 % SOLUTION: Performed by: INTERNAL MEDICINE

## 2024-06-26 PROCEDURE — 93005 ELECTROCARDIOGRAM TRACING: CPT | Performed by: INTERNAL MEDICINE

## 2024-06-26 RX ORDER — FLUTICASONE FUROATE, UMECLIDINIUM BROMIDE AND VILANTEROL TRIFENATATE 100; 62.5; 25 UG/1; UG/1; UG/1
1 POWDER RESPIRATORY (INHALATION)
COMMUNITY

## 2024-06-26 RX ORDER — METHOHEXITAL IN WATER/PF 100MG/10ML
SYRINGE (ML) INTRAVENOUS
Status: COMPLETED | OUTPATIENT
Start: 2024-06-26 | End: 2024-06-26

## 2024-06-26 RX ORDER — SODIUM CHLORIDE 9 MG/ML
50 INJECTION, SOLUTION INTRAVENOUS CONTINUOUS
Status: DISCONTINUED | OUTPATIENT
Start: 2024-06-26 | End: 2024-06-27 | Stop reason: HOSPADM

## 2024-06-26 RX ORDER — AMIODARONE HYDROCHLORIDE 200 MG/1
400 TABLET ORAL 3 TIMES DAILY
Qty: 180 TABLET | Refills: 2 | Status: SHIPPED | OUTPATIENT
Start: 2024-06-26

## 2024-06-26 RX ORDER — SODIUM CHLORIDE 0.9 % (FLUSH) 0.9 %
10 SYRINGE (ML) INJECTION EVERY 12 HOURS SCHEDULED
Status: DISCONTINUED | OUTPATIENT
Start: 2024-06-26 | End: 2024-06-27 | Stop reason: HOSPADM

## 2024-06-26 RX ADMIN — Medication 10 MG: at 16:14

## 2024-06-26 RX ADMIN — Medication 10 MG: at 16:13

## 2024-06-26 RX ADMIN — SODIUM CHLORIDE 50 ML/HR: 9 INJECTION, SOLUTION INTRAVENOUS at 13:39

## 2024-06-26 RX ADMIN — Medication 60 MG: at 16:06

## 2024-06-26 NOTE — LETTER
June 26, 2024     Patient: Marcus Simon   YOB: 1965   Date of Visit: 6/26/2024       To Whom It May Concern:    It is my medical opinion that Marcus Simon may return to work on 6/28/24 .           Sincerely,        Ben Adams MD  [facility]

## 2024-06-27 LAB
QT INTERVAL: 407 MS
QT INTERVAL: 571 MS
QTC INTERVAL: 551 MS
QTC INTERVAL: 599 MS

## 2024-06-27 RX ORDER — ATENOLOL 50 MG/1
50 TABLET ORAL EVERY 12 HOURS SCHEDULED
Qty: 180 TABLET | Refills: 3 | Status: SHIPPED | OUTPATIENT
Start: 2024-06-27

## 2024-07-12 ENCOUNTER — OFFICE VISIT (OUTPATIENT)
Age: 59
End: 2024-07-12
Payer: MEDICARE

## 2024-07-12 VITALS
HEART RATE: 73 BPM | HEIGHT: 76 IN | WEIGHT: 315 LBS | SYSTOLIC BLOOD PRESSURE: 148 MMHG | DIASTOLIC BLOOD PRESSURE: 98 MMHG | BODY MASS INDEX: 38.36 KG/M2

## 2024-07-12 DIAGNOSIS — I48.19 PERSISTENT ATRIAL FIBRILLATION: Primary | ICD-10-CM

## 2024-07-12 DIAGNOSIS — Z98.890 H/O CARDIAC RADIOFREQUENCY ABLATION: ICD-10-CM

## 2024-07-12 RX ORDER — COLCHICINE 0.6 MG/1
TABLET ORAL AS NEEDED
COMMUNITY
Start: 2024-05-20
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
## 2024-07-25 ENCOUNTER — TELEPHONE (OUTPATIENT)
Age: 59
End: 2024-07-25
Payer: MEDICARE

## 2024-07-25 NOTE — TELEPHONE ENCOUNTER
PA for amiodarone completed over the phone.    Documentation faxed to [phone], confirmation received.    Awaiting decision.

## 2024-07-29 ENCOUNTER — TELEPHONE (OUTPATIENT)
Dept: CARDIOLOGY | Facility: CLINIC | Age: 59
End: 2024-07-29

## 2024-07-29 NOTE — TELEPHONE ENCOUNTER
Caller: Marcus Simon    Relationship to patient: Self    Best call back number: [phone]    Patient is needing: PATIENT'S INSURANCE NEEDS AN AUTHORIZATION TO REFILL THE MEDICATIONS AT THE PHARMACY. PLEASE CALL PATIENT TO ADVISE. PLEASE FAX AUTHORIZATION TO PHARMACY       [facility] [id number] - [place], [place] - [address] [place] [address] PK[address] - [phone]  - [phone] [address] [address]   Phone: [phone] Fax: [phone]     FOR:    amiodarone (PACERONE) 200 MG tablet   atenolol (TENORMIN) 50 MG tablet

## 2024-08-02 DIAGNOSIS — I48.19 PERSISTENT ATRIAL FIBRILLATION: ICD-10-CM

## 2024-08-02 RX ORDER — ATENOLOL 50 MG/1
50 TABLET ORAL EVERY 12 HOURS SCHEDULED
Qty: 180 TABLET | Refills: 3 | Status: SHIPPED | OUTPATIENT
Start: 2024-08-02

## 2024-08-02 RX ORDER — AMIODARONE HYDROCHLORIDE 200 MG/1
400 TABLET ORAL 3 TIMES DAILY
Qty: 180 TABLET | Refills: 2 | Status: SHIPPED | OUTPATIENT
Start: 2024-08-02

## 2024-08-07 DIAGNOSIS — I50.20 HEART FAILURE WITH REDUCED EJECTION FRACTION, NYHA CLASS II: ICD-10-CM

## 2024-08-07 RX ORDER — FUROSEMIDE 20 MG/1
40 TABLET ORAL 2 TIMES DAILY
Start: 2024-08-07

## 2024-08-07 NOTE — TELEPHONE ENCOUNTER
Caller: Marcus Simon    Relationship: Self    Best call back number: [phone]     Requested Prescriptions:   Requested Prescriptions     Pending Prescriptions Disp Refills    furosemide (Lasix) 20 MG tablet       Sig: Take 2 tablets by mouth 2 (Two) Times a Day. TAKES 40 MG IN AM & 20 MG IN PM        Pharmacy where request should be sent: [facility] [id number] - [address] - [phone]  - [phone] FX     Last office visit with prescribing clinician: 12/27/2022   Last telemedicine visit with prescribing clinician: Visit date not found   Next office visit with prescribing clinician: Visit date not found     Additional details provided by patient: PT WAS TOLD TO START TAKING 2 TABLETS IN THE MORNING AND 1 AT NIGHT OF HIS LASIX INSTEAD OF THE 1 TABLET MORNING AND NIGHT - PT STATES HE IS NEEDING AN UPDATED PRESCRIPTION SENT TO PHARMACY AS THEY ARE UNABLE TO FILL MEDICATION WITHOUT IT BEING UPDATED - PT STATES HE SWITCHED INS AND ISNT SURE WHAT MONTHLY SUPPLY HE IS QUALIFIED FOR BUT WOULD PREFER AS MANY AS POSSIBLE      Does the patient have less than a 3 day supply:  [x] Yes  [] No    Paulo Abdi Rep   08/07/24 10:24 EDT

## 2024-11-11 ENCOUNTER — TELEPHONE (OUTPATIENT)
Dept: CARDIOLOGY | Facility: CLINIC | Age: 59
End: 2024-11-11

## 2024-11-11 NOTE — TELEPHONE ENCOUNTER
Caller: Marcus Simon    Relationship: Self    Best call back number: [phone]     What was the call regarding: PT STATES HE BELIEVES HE MAY BE SLIPPING IN AND OUT OF AFIB AGAIN - PT STATES HIS WORK PROVIDER IS CONSISTENTLY POINTING OUT PT IS IN AFIB AND ASKING WHY ITS NOT BEEN LOOKED INTO MORE - PT WANTING TO GET SOME ADVISEMENT - HE STATES THERE IS NO REAL PROBLEM AS HE IS USED TO BEING IN AFIB BUT WANTING TO SEE OPTIONS - PT MONITORS HIS AFIB AND OVERALL SEEMS TO BE OKAY - THE [facility] THROUGH HIS WORK COMES OF INSISTENT AND WANTS TO MAKE PROVIDER AWARE JUST IN CASE -

## 2024-11-12 ENCOUNTER — CLINICAL SUPPORT (OUTPATIENT)
Dept: CARDIOLOGY | Facility: CLINIC | Age: 59
End: 2024-11-12
Payer: COMMERCIAL

## 2024-11-12 DIAGNOSIS — I48.19 PERSISTENT ATRIAL FIBRILLATION: Primary | ICD-10-CM

## 2024-11-12 PROCEDURE — 93000 ELECTROCARDIOGRAM COMPLETE: CPT

## 2024-11-12 NOTE — PROGRESS NOTES
Procedure     ECG 12 Lead    Date/Time: 11/12/2024 11:54 AM  Performed by: Noah Aggarwal APRN    Authorized by: Noah Aggarwal APRN  Comparison: compared with previous ECG   Similar to previous ECG  Rhythm: atrial fibrillation  Comments: Patient back in symptomatic AF refractory to amiodarone. He has history of hybrid ablation and PVI, posterior wall. We discussed options and prefers ablation.

## 2024-12-23 ENCOUNTER — LAB (OUTPATIENT)
Dept: LAB | Facility: HOSPITAL | Age: 59
End: 2024-12-23
Payer: COMMERCIAL

## 2024-12-23 DIAGNOSIS — I48.19 PERSISTENT ATRIAL FIBRILLATION: ICD-10-CM

## 2024-12-23 LAB
ANION GAP SERPL CALCULATED.3IONS-SCNC: 8.3 MMOL/L (ref 5–15)
BUN SERPL-MCNC: 15 MG/DL (ref 6–20)
BUN/CREAT SERPL: 16.3 (ref 7–25)
CALCIUM SPEC-SCNC: 9.1 MG/DL (ref 8.6–10.5)
CHLORIDE SERPL-SCNC: 100 MMOL/L (ref 98–107)
CO2 SERPL-SCNC: 29.7 MMOL/L (ref 22–29)
CREAT SERPL-MCNC: 0.92 MG/DL (ref 0.76–1.27)
DEPRECATED RDW RBC AUTO: 49.9 FL (ref 37–54)
EGFRCR SERPLBLD CKD-EPI 2021: 96.4 ML/MIN/1.73
ERYTHROCYTE [DISTWIDTH] IN BLOOD BY AUTOMATED COUNT: 13.1 % (ref 12.3–15.4)
GLUCOSE SERPL-MCNC: 152 MG/DL (ref 65–99)
HCT VFR BLD AUTO: 43.7 % (ref 37.5–51)
HGB BLD-MCNC: 14.9 G/DL (ref 13–17.7)
MCH RBC QN AUTO: 35.5 PG (ref 26.6–33)
MCHC RBC AUTO-ENTMCNC: 34.1 G/DL (ref 31.5–35.7)
MCV RBC AUTO: 104 FL (ref 79–97)
PLATELET # BLD AUTO: 196 10*3/MM3 (ref 140–450)
PMV BLD AUTO: 9.5 FL (ref 6–12)
POTASSIUM SERPL-SCNC: 4.3 MMOL/L (ref 3.5–5.2)
RBC # BLD AUTO: 4.2 10*6/MM3 (ref 4.14–5.8)
SODIUM SERPL-SCNC: 138 MMOL/L (ref 136–145)
WBC NRBC COR # BLD AUTO: 4.68 10*3/MM3 (ref 3.4–10.8)

## 2024-12-23 PROCEDURE — 36415 COLL VENOUS BLD VENIPUNCTURE: CPT

## 2024-12-23 PROCEDURE — 80048 BASIC METABOLIC PNL TOTAL CA: CPT

## 2024-12-23 PROCEDURE — 85027 COMPLETE CBC AUTOMATED: CPT

## 2024-12-29 DIAGNOSIS — I48.19 PERSISTENT ATRIAL FIBRILLATION: ICD-10-CM

## 2024-12-30 ENCOUNTER — HOSPITAL ENCOUNTER (OUTPATIENT)
Facility: HOSPITAL | Age: 59
Setting detail: HOSPITAL OUTPATIENT SURGERY
Discharge: HOME | End: 2024-12-30
Attending: INTERNAL MEDICINE
Payer: COMMERCIAL

## 2024-12-30 ENCOUNTER — ANESTHESIA (OUTPATIENT)
Dept: CARDIOLOGY | Facility: HOSPITAL | Age: 59
End: 2024-12-30
Payer: COMMERCIAL

## 2024-12-30 ENCOUNTER — ANESTHESIA EVENT (OUTPATIENT)
Dept: CARDIOLOGY | Facility: HOSPITAL | Age: 59
End: 2024-12-30
Payer: COMMERCIAL

## 2024-12-30 VITALS
TEMPERATURE: 96.8 F | WEIGHT: 315 LBS | HEART RATE: 51 BPM | BODY MASS INDEX: 38.36 KG/M2 | SYSTOLIC BLOOD PRESSURE: 104 MMHG | HEIGHT: 76 IN | RESPIRATION RATE: 22 BRPM | DIASTOLIC BLOOD PRESSURE: 68 MMHG | OXYGEN SATURATION: 95 %

## 2024-12-30 DIAGNOSIS — I48.19 PERSISTENT ATRIAL FIBRILLATION: ICD-10-CM

## 2024-12-30 LAB
ACT BLD: 285 SECONDS (ref 82–152)
GLUCOSE BLDC GLUCOMTR-MCNC: 178 MG/DL (ref 70–130)
QT INTERVAL: 410 MS
QT INTERVAL: 583 MS
QTC INTERVAL: 545 MS
QTC INTERVAL: 578 MS

## 2024-12-30 PROCEDURE — 93653 COMPRE EP EVAL TX SVT: CPT | Performed by: INTERNAL MEDICINE

## 2024-12-30 PROCEDURE — C1893 INTRO/SHEATH, FIXED,NON-PEEL: HCPCS | Performed by: INTERNAL MEDICINE

## 2024-12-30 PROCEDURE — 25010000002 HEPARIN (PORCINE) PER 1000 UNITS: Performed by: INTERNAL MEDICINE

## 2024-12-30 PROCEDURE — 93005 ELECTROCARDIOGRAM TRACING: CPT | Performed by: INTERNAL MEDICINE

## 2024-12-30 PROCEDURE — 25010000002 PHENYLEPHRINE 10 MG/ML SOLUTION 5 ML VIAL

## 2024-12-30 PROCEDURE — C1766 INTRO/SHEATH,STRBLE,NON-PEEL: HCPCS | Performed by: INTERNAL MEDICINE

## 2024-12-30 PROCEDURE — 85347 COAGULATION TIME ACTIVATED: CPT

## 2024-12-30 PROCEDURE — 25010000002 GLYCOPYRROLATE 0.2 MG/ML SOLUTION

## 2024-12-30 PROCEDURE — C1894 INTRO/SHEATH, NON-LASER: HCPCS | Performed by: INTERNAL MEDICINE

## 2024-12-30 PROCEDURE — 93462 L HRT CATH TRNSPTL PUNCTURE: CPT | Performed by: INTERNAL MEDICINE

## 2024-12-30 PROCEDURE — C1769 GUIDE WIRE: HCPCS | Performed by: INTERNAL MEDICINE

## 2024-12-30 PROCEDURE — 25010000002 LIDOCAINE 2% SOLUTION

## 2024-12-30 PROCEDURE — C1759 CATH, INTRA ECHOCARDIOGRAPHY: HCPCS | Performed by: INTERNAL MEDICINE

## 2024-12-30 PROCEDURE — 25810000003 SODIUM CHLORIDE 0.9 % SOLUTION 250 ML FLEX CONT

## 2024-12-30 PROCEDURE — 25010000002 PROPOFOL 10 MG/ML EMULSION

## 2024-12-30 PROCEDURE — 93662 INTRACARDIAC ECG (ICE): CPT | Performed by: INTERNAL MEDICINE

## 2024-12-30 PROCEDURE — C1760 CLOSURE DEV, VASC: HCPCS | Performed by: INTERNAL MEDICINE

## 2024-12-30 PROCEDURE — 25010000002 ONDANSETRON PER 1 MG

## 2024-12-30 PROCEDURE — 93655 ICAR CATH ABLTJ DSCRT ARRHYT: CPT | Performed by: INTERNAL MEDICINE

## 2024-12-30 PROCEDURE — C1730 CATH, EP, 19 OR FEW ELECT: HCPCS | Performed by: INTERNAL MEDICINE

## 2024-12-30 PROCEDURE — 25010000002 SUGAMMADEX 200 MG/2ML SOLUTION

## 2024-12-30 PROCEDURE — 25010000002 PHENYLEPHRINE 10 MG/ML SOLUTION

## 2024-12-30 PROCEDURE — 25010000002 PROTAMINE SULFATE PER 10 MG: Performed by: INTERNAL MEDICINE

## 2024-12-30 PROCEDURE — 82948 REAGENT STRIP/BLOOD GLUCOSE: CPT

## 2024-12-30 PROCEDURE — C1733 CATH, EP, OTHR THAN COOL-TIP: HCPCS | Performed by: INTERNAL MEDICINE

## 2024-12-30 PROCEDURE — C1732 CATH, EP, DIAG/ABL, 3D/VECT: HCPCS | Performed by: INTERNAL MEDICINE

## 2024-12-30 RX ORDER — NALOXONE HCL 0.4 MG/ML
0.2 VIAL (ML) INJECTION AS NEEDED
Status: DISCONTINUED | OUTPATIENT
Start: 2024-12-30 | End: 2024-12-30 | Stop reason: HOSPADM

## 2024-12-30 RX ORDER — NALOXONE HCL 0.4 MG/ML
0.4 VIAL (ML) INJECTION
Status: DISCONTINUED | OUTPATIENT
Start: 2024-12-30 | End: 2024-12-30 | Stop reason: HOSPADM

## 2024-12-30 RX ORDER — LABETALOL HYDROCHLORIDE 5 MG/ML
5 INJECTION, SOLUTION INTRAVENOUS
Status: DISCONTINUED | OUTPATIENT
Start: 2024-12-30 | End: 2024-12-30 | Stop reason: HOSPADM

## 2024-12-30 RX ORDER — PROCHLORPERAZINE EDISYLATE 5 MG/ML
10 INJECTION INTRAMUSCULAR; INTRAVENOUS EVERY 6 HOURS PRN
Status: DISCONTINUED | OUTPATIENT
Start: 2024-12-30 | End: 2024-12-30 | Stop reason: HOSPADM

## 2024-12-30 RX ORDER — SODIUM CHLORIDE 0.9 % (FLUSH) 0.9 %
10 SYRINGE (ML) INJECTION EVERY 12 HOURS SCHEDULED
Status: DISCONTINUED | OUTPATIENT
Start: 2024-12-30 | End: 2024-12-30 | Stop reason: HOSPADM

## 2024-12-30 RX ORDER — LIDOCAINE HYDROCHLORIDE 10 MG/ML
0.5 INJECTION, SOLUTION INFILTRATION; PERINEURAL ONCE AS NEEDED
Status: DISCONTINUED | OUTPATIENT
Start: 2024-12-30 | End: 2024-12-30 | Stop reason: HOSPADM

## 2024-12-30 RX ORDER — FAMOTIDINE 10 MG/ML
20 INJECTION, SOLUTION INTRAVENOUS ONCE
Status: COMPLETED | OUTPATIENT
Start: 2024-12-30 | End: 2024-12-30

## 2024-12-30 RX ORDER — OXYCODONE AND ACETAMINOPHEN 7.5; 325 MG/1; MG/1
1 TABLET ORAL EVERY 4 HOURS PRN
Status: DISCONTINUED | OUTPATIENT
Start: 2024-12-30 | End: 2024-12-30 | Stop reason: HOSPADM

## 2024-12-30 RX ORDER — LIDOCAINE HYDROCHLORIDE 20 MG/ML
INJECTION, SOLUTION INFILTRATION; PERINEURAL AS NEEDED
Status: DISCONTINUED | OUTPATIENT
Start: 2024-12-30 | End: 2024-12-30 | Stop reason: SURG

## 2024-12-30 RX ORDER — DIPHENHYDRAMINE HYDROCHLORIDE 50 MG/ML
12.5 INJECTION INTRAMUSCULAR; INTRAVENOUS
Status: DISCONTINUED | OUTPATIENT
Start: 2024-12-30 | End: 2024-12-30 | Stop reason: HOSPADM

## 2024-12-30 RX ORDER — PROMETHAZINE HYDROCHLORIDE 12.5 MG/1
25 TABLET ORAL ONCE AS NEEDED
Status: DISCONTINUED | OUTPATIENT
Start: 2024-12-30 | End: 2024-12-30 | Stop reason: HOSPADM

## 2024-12-30 RX ORDER — FENTANYL CITRATE 50 UG/ML
50 INJECTION, SOLUTION INTRAMUSCULAR; INTRAVENOUS ONCE AS NEEDED
Status: DISCONTINUED | OUTPATIENT
Start: 2024-12-30 | End: 2024-12-30 | Stop reason: HOSPADM

## 2024-12-30 RX ORDER — SODIUM CHLORIDE 0.9 % (FLUSH) 0.9 %
3-10 SYRINGE (ML) INJECTION AS NEEDED
Status: DISCONTINUED | OUTPATIENT
Start: 2024-12-30 | End: 2024-12-30 | Stop reason: HOSPADM

## 2024-12-30 RX ORDER — GLYCOPYRROLATE 0.2 MG/ML
INJECTION INTRAMUSCULAR; INTRAVENOUS AS NEEDED
Status: DISCONTINUED | OUTPATIENT
Start: 2024-12-30 | End: 2024-12-30 | Stop reason: SURG

## 2024-12-30 RX ORDER — ROCURONIUM BROMIDE 10 MG/ML
INJECTION, SOLUTION INTRAVENOUS AS NEEDED
Status: DISCONTINUED | OUTPATIENT
Start: 2024-12-30 | End: 2024-12-30 | Stop reason: SURG

## 2024-12-30 RX ORDER — HYDROCODONE BITARTRATE AND ACETAMINOPHEN 5; 325 MG/1; MG/1
1 TABLET ORAL ONCE AS NEEDED
Status: DISCONTINUED | OUTPATIENT
Start: 2024-12-30 | End: 2024-12-30 | Stop reason: HOSPADM

## 2024-12-30 RX ORDER — SODIUM CHLORIDE, SODIUM LACTATE, POTASSIUM CHLORIDE, CALCIUM CHLORIDE 600; 310; 30; 20 MG/100ML; MG/100ML; MG/100ML; MG/100ML
9 INJECTION, SOLUTION INTRAVENOUS CONTINUOUS
Status: DISCONTINUED | OUTPATIENT
Start: 2024-12-30 | End: 2024-12-30 | Stop reason: HOSPADM

## 2024-12-30 RX ORDER — SODIUM CHLORIDE 0.9 % (FLUSH) 0.9 %
10 SYRINGE (ML) INJECTION AS NEEDED
Status: DISCONTINUED | OUTPATIENT
Start: 2024-12-30 | End: 2024-12-30 | Stop reason: HOSPADM

## 2024-12-30 RX ORDER — APIXABAN 5 MG/1
5 TABLET, FILM COATED ORAL 2 TIMES DAILY
Qty: 60 TABLET | Refills: 11 | Status: SHIPPED | OUTPATIENT
Start: 2024-12-30

## 2024-12-30 RX ORDER — EPHEDRINE SULFATE 50 MG/ML
5 INJECTION, SOLUTION INTRAVENOUS ONCE AS NEEDED
Status: DISCONTINUED | OUTPATIENT
Start: 2024-12-30 | End: 2024-12-30 | Stop reason: HOSPADM

## 2024-12-30 RX ORDER — ACETAMINOPHEN 650 MG/1
650 SUPPOSITORY RECTAL EVERY 4 HOURS PRN
Status: DISCONTINUED | OUTPATIENT
Start: 2024-12-30 | End: 2024-12-30 | Stop reason: HOSPADM

## 2024-12-30 RX ORDER — ONDANSETRON 2 MG/ML
INJECTION INTRAMUSCULAR; INTRAVENOUS AS NEEDED
Status: DISCONTINUED | OUTPATIENT
Start: 2024-12-30 | End: 2024-12-30 | Stop reason: SURG

## 2024-12-30 RX ORDER — ONDANSETRON 2 MG/ML
4 INJECTION INTRAMUSCULAR; INTRAVENOUS ONCE AS NEEDED
Status: DISCONTINUED | OUTPATIENT
Start: 2024-12-30 | End: 2024-12-30 | Stop reason: HOSPADM

## 2024-12-30 RX ORDER — SODIUM CHLORIDE 9 MG/ML
50 INJECTION, SOLUTION INTRAVENOUS CONTINUOUS
Status: DISCONTINUED | OUTPATIENT
Start: 2024-12-30 | End: 2024-12-30 | Stop reason: HOSPADM

## 2024-12-30 RX ORDER — PHENYLEPHRINE HYDROCHLORIDE 10 MG/ML
INJECTION INTRAVENOUS AS NEEDED
Status: DISCONTINUED | OUTPATIENT
Start: 2024-12-30 | End: 2024-12-30 | Stop reason: SURG

## 2024-12-30 RX ORDER — PROPOFOL 10 MG/ML
VIAL (ML) INTRAVENOUS AS NEEDED
Status: DISCONTINUED | OUTPATIENT
Start: 2024-12-30 | End: 2024-12-30 | Stop reason: SURG

## 2024-12-30 RX ORDER — HYDROMORPHONE HYDROCHLORIDE 1 MG/ML
0.5 INJECTION, SOLUTION INTRAMUSCULAR; INTRAVENOUS; SUBCUTANEOUS
Status: DISCONTINUED | OUTPATIENT
Start: 2024-12-30 | End: 2024-12-30 | Stop reason: HOSPADM

## 2024-12-30 RX ORDER — IPRATROPIUM BROMIDE AND ALBUTEROL SULFATE 2.5; .5 MG/3ML; MG/3ML
3 SOLUTION RESPIRATORY (INHALATION) ONCE AS NEEDED
Status: DISCONTINUED | OUTPATIENT
Start: 2024-12-30 | End: 2024-12-30 | Stop reason: HOSPADM

## 2024-12-30 RX ORDER — HEPARIN SODIUM 1000 [USP'U]/ML
INJECTION, SOLUTION INTRAVENOUS; SUBCUTANEOUS
Status: DISCONTINUED | OUTPATIENT
Start: 2024-12-30 | End: 2024-12-30 | Stop reason: HOSPADM

## 2024-12-30 RX ORDER — PROTAMINE SULFATE 10 MG/ML
INJECTION, SOLUTION INTRAVENOUS
Status: DISCONTINUED | OUTPATIENT
Start: 2024-12-30 | End: 2024-12-30 | Stop reason: HOSPADM

## 2024-12-30 RX ORDER — FENTANYL CITRATE 50 UG/ML
50 INJECTION, SOLUTION INTRAMUSCULAR; INTRAVENOUS
Status: DISCONTINUED | OUTPATIENT
Start: 2024-12-30 | End: 2024-12-30 | Stop reason: HOSPADM

## 2024-12-30 RX ORDER — PROMETHAZINE HYDROCHLORIDE 25 MG/1
25 SUPPOSITORY RECTAL ONCE AS NEEDED
Status: DISCONTINUED | OUTPATIENT
Start: 2024-12-30 | End: 2024-12-30 | Stop reason: HOSPADM

## 2024-12-30 RX ORDER — ATROPINE SULFATE 0.4 MG/ML
0.4 INJECTION, SOLUTION INTRAMUSCULAR; INTRAVENOUS; SUBCUTANEOUS ONCE AS NEEDED
Status: DISCONTINUED | OUTPATIENT
Start: 2024-12-30 | End: 2024-12-30 | Stop reason: HOSPADM

## 2024-12-30 RX ORDER — ONDANSETRON 2 MG/ML
4 INJECTION INTRAMUSCULAR; INTRAVENOUS EVERY 6 HOURS PRN
Status: DISCONTINUED | OUTPATIENT
Start: 2024-12-30 | End: 2024-12-30 | Stop reason: HOSPADM

## 2024-12-30 RX ORDER — HYDRALAZINE HYDROCHLORIDE 20 MG/ML
5 INJECTION INTRAMUSCULAR; INTRAVENOUS
Status: DISCONTINUED | OUTPATIENT
Start: 2024-12-30 | End: 2024-12-30 | Stop reason: HOSPADM

## 2024-12-30 RX ORDER — SODIUM CHLORIDE 0.9 % (FLUSH) 0.9 %
3 SYRINGE (ML) INJECTION EVERY 12 HOURS SCHEDULED
Status: DISCONTINUED | OUTPATIENT
Start: 2024-12-30 | End: 2024-12-30 | Stop reason: HOSPADM

## 2024-12-30 RX ORDER — MIDAZOLAM HYDROCHLORIDE 1 MG/ML
1 INJECTION, SOLUTION INTRAMUSCULAR; INTRAVENOUS
Status: DISCONTINUED | OUTPATIENT
Start: 2024-12-30 | End: 2024-12-30 | Stop reason: HOSPADM

## 2024-12-30 RX ORDER — FLUMAZENIL 0.1 MG/ML
0.2 INJECTION INTRAVENOUS AS NEEDED
Status: DISCONTINUED | OUTPATIENT
Start: 2024-12-30 | End: 2024-12-30 | Stop reason: HOSPADM

## 2024-12-30 RX ORDER — ACETAMINOPHEN 325 MG/1
650 TABLET ORAL EVERY 4 HOURS PRN
Status: DISCONTINUED | OUTPATIENT
Start: 2024-12-30 | End: 2024-12-30 | Stop reason: HOSPADM

## 2024-12-30 RX ADMIN — ROCURONIUM BROMIDE 20 MG: 10 INJECTION, SOLUTION INTRAVENOUS at 09:26

## 2024-12-30 RX ADMIN — ONDANSETRON 4 MG: 2 INJECTION INTRAMUSCULAR; INTRAVENOUS at 08:22

## 2024-12-30 RX ADMIN — PROPOFOL 240 MG: 10 INJECTION, EMULSION INTRAVENOUS at 08:15

## 2024-12-30 RX ADMIN — PHENYLEPHRINE HYDROCHLORIDE 100 MCG: 10 INJECTION INTRAVENOUS at 08:38

## 2024-12-30 RX ADMIN — LIDOCAINE HYDROCHLORIDE 100 MG: 20 INJECTION, SOLUTION INFILTRATION; PERINEURAL at 08:15

## 2024-12-30 RX ADMIN — PHENYLEPHRINE HYDROCHLORIDE 0.5 MCG/KG/MIN: 10 INJECTION, SOLUTION INTRAVENOUS at 08:40

## 2024-12-30 RX ADMIN — GLYCOPYRROLATE 0.2 MG: 0.2 INJECTION INTRAMUSCULAR; INTRAVENOUS at 09:31

## 2024-12-30 RX ADMIN — SODIUM CHLORIDE 50 ML/HR: 9 INJECTION, SOLUTION INTRAVENOUS at 07:24

## 2024-12-30 RX ADMIN — ROCURONIUM BROMIDE 80 MG: 10 INJECTION, SOLUTION INTRAVENOUS at 08:16

## 2024-12-30 RX ADMIN — ROCURONIUM BROMIDE 20 MG: 10 INJECTION, SOLUTION INTRAVENOUS at 09:07

## 2024-12-30 RX ADMIN — ROCURONIUM BROMIDE 20 MG: 10 INJECTION, SOLUTION INTRAVENOUS at 09:46

## 2024-12-30 RX ADMIN — SUGAMMADEX 400 MG: 100 INJECTION, SOLUTION INTRAVENOUS at 09:55

## 2024-12-30 RX ADMIN — FAMOTIDINE 20 MG: 10 INJECTION INTRAVENOUS at 07:52
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
## 2024-12-30 NOTE — ANESTHESIA PREPROCEDURE EVALUATION
Anesthesia Evaluation     Patient summary reviewed and Nursing notes reviewed   NPO Solid Status: > 8 hours  NPO Liquid Status: > 2 hours           Airway   Mallampati: II  TM distance: >3 FB  Neck ROM: full  Comment: Cormack-Lehane Classification: grade I - full view of glottis  Technique Used For Successful Placement: direct laryngoscopy  Devices/Methods Used in Placement: intubating stylet, anterior pressure/BURP  Insertion Site: oral  Blade Type: Cruz  Blade Size: 2  Dental - normal exam     Pulmonary    (+) COPD,shortness of breath, sleep apnea on CPAP  Cardiovascular   Exercise tolerance: good (4-7 METS)    ECG reviewed  Patient on routine beta blocker and Beta blocker given within 24 hours of surgery    (+) hypertension, past MI , CAD, dysrhythmias Atrial Fib, CHF Systolic <55%, hyperlipidemia      Neuro/Psych- negative ROS  GI/Hepatic/Renal/Endo    (+) morbid obesity, diabetes mellitus type 2, thyroid problem hypothyroidism    Musculoskeletal     Abdominal    Substance History - negative use     OB/GYN          Other   arthritis,         Phys Exam Other: goatee                Anesthesia Plan    ASA 3     general     (I have reviewed the patient's history with the patient and the chart, including all pertinent laboratory results and imaging. I have explained the risks of anesthesia including but not limited to dental damage, corneal abrasion, nerve injury, MI, stroke, and death. Questions asked and answered. Anesthetic plan discussed with patient and team as indicated. Patient expressed understanding of the above.  )  intravenous induction     Anesthetic plan, risks, benefits, and alternatives have been provided, discussed and informed consent has been obtained with: patient.      CODE STATUS:

## 2024-12-30 NOTE — Clinical Note
Hemostasis started on the right femoral vein. Perclose was used in achieving hemostasis. Closure device deployed in the vessel and manual pressure applied to vessel. Manual pressure was held by LYNSEY Verduzco. Manual pressure was held for 10 min. Hemostasis achieved successfully.

## 2024-12-30 NOTE — Clinical Note
Replaced previous sheath in the right femoral vein. Long 8fr out, versacross sheath with Chesaning wire in

## 2024-12-30 NOTE — DISCHARGE INSTRUCTIONS
[facility]  Cardiology  [address] [place] [address]  [phone]    Coronary Ablation After Care    Refer to this sheet in the next few weeks. These instructions provide you with information on caring for yourself after your procedure. Your health care provider may also give you more specific instructions. Your treatment has been planned according to current medical practices, but problems sometimes occur. Call your health care provider if you have any problems or questions after your procedure.      What to Expect After the Procedure:  After your procedure, it is typical to have the following sensations:  Minor discomfort or tenderness and a small bump at the catheter insertion site(s). The bump(s) should usually decrease in size and tenderness within 1 to 2 weeks.  Any bruising will usually fade within 2 to 4 weeks.  Home Care Instructions:  Do not apply powder or lotion to the site.  Do not take baths, swim, or use a hot tub until your health care provider approves and the site is completely healed.  Do not bend, squat, or lift anything over 20 lb (9 kg) or as directed by your health care provider. However, we recommend lifting nothing heavier than a gallon of milk.    You may shower 24 hours after the procedure. Remove the bandage (dressing) and gently wash the site with plain soap and water. Gently pat the site dry. You may apply a band aid daily for 2 days if desired.    Inspect the site at least twice daily.  Increase your fluid intake for the next 2 days.    Limit your activity for the first 48 hours.   Avoid strenuous activity for 1 week or as advised by your physician.    Follow instructions about when you can drive or return to work as directed by your physician.    Hold direct pressure over the site when you cough, sneeze, laugh or change positions.  Do this for the next 2 days.    Call Your Doctor If:  You have drainage (other than a small amount of blood on the dressing).  You  have chills or a fever > 101.  You have redness, warmth, swelling (larger than a walnut), or pain at the insertion   You develop palpitations, chest pain or shortness of breath, feel faint, or pass out.  You develop pain, discoloration, coldness, numbness, tingling, or severe bruising in the leg that held the catheter.  You develop bleeding from any other place, such as the bowels.  You have heavy bleeding from the site.  If this happens, hold pressure on the site and call 911.        Make Sure You:  Understand these instructions.  Will watch your condition.  Will get help right away if you are not doing well or get worse.

## 2024-12-30 NOTE — Clinical Note
Sheath inserted into accessed vessel. RFV #2, long 8fr. (Attempted to use short 8fr #2, switched to long 8fr sheath)

## 2024-12-30 NOTE — Clinical Note
2 sets of defib pads, and both sets of 3-leads attached to pt r/t size and Hx of needing 2 sets in past.

## 2024-12-30 NOTE — ANESTHESIA PROCEDURE NOTES
Airway  Urgency: elective    Date/Time: 12/30/2024 8:19 AM  Airway not difficult    General Information and Staff    Patient location during procedure: OR  CRNA/CAA: Cassius Leiva CRNA    Indications and Patient Condition  Indications for airway management: airway protection    Preoxygenated: yes  Mask difficulty assessment: 3 - difficult mask (inadequate, unstable or two providers) +/- NMBA    Final Airway Details  Final airway type: endotracheal airway      Successful airway: ETT  Cuffed: yes   Successful intubation technique: video laryngoscopy  Facilitating devices/methods: intubating stylet and cricoid pressure  Endotracheal tube insertion site: oral  Blade: CMAC  Blade size: D  ETT size (mm): 7.5  Cormack-Lehane Classification: grade I - full view of glottis  Placement verified by: chest auscultation and capnometry   Cuff volume (mL): 8  Measured from: teeth  ETT/EBT  to teeth (cm): 22  Number of attempts at approach: 1  Assessment: lips, teeth, and gum same as pre-op and atraumatic intubation

## 2024-12-30 NOTE — Clinical Note
Hemostasis started on the right femoral vein. Closure device deployed in the vessel and manual pressure applied to vessel. Manual pressure was held by LYNSEY Verduzco. Manual pressure was held for 10 min. Hemostasis achieved successfully.

## 2024-12-30 NOTE — Clinical Note
The DP pulses are +1 bilaterally. The PT pulses are +1 bilaterally. ITZ heels very dry, intact, and elevated; cocoon warmer on at 46°C.

## 2024-12-30 NOTE — ANESTHESIA POSTPROCEDURE EVALUATION
"Patient: Marcus Simon    Procedure Summary       Date: 12/30/24 Room / Location: [facility] /  [facility]    Anesthesia Start: 0805 Anesthesia Stop: 1018    Procedures:       Ablation atrial fibrillation  PFA      3D Mapping EP Diagnosis:       Persistent atrial fibrillation      (persistent atrial fibrillation)    Providers: Ben Adams MD Provider: Randall Black MD    Anesthesia Type: general ASA Status: 3            Anesthesia Type: general    Vitals  Vitals Value Taken Time   /70 12/30/24 1239   Temp     Pulse 47 12/30/24 1242   Resp 18 12/30/24 1130   SpO2 91 % 12/30/24 1242   Vitals shown include unfiled device data.        Post Anesthesia Care and Evaluation    Patient location during evaluation: PACU  Patient participation: complete - patient participated  Level of consciousness: awake and alert  Pain management: adequate    Airway patency: patent  Anesthetic complications: No anesthetic complications  PONV Status: controlled  Cardiovascular status: acceptable and hemodynamically stable  Respiratory status: acceptable  Hydration status: acceptable    Comments: /64   Pulse (!) 48   Temp 36 °C (96.8 °F) (Temporal)   Resp 20   Ht 193 cm (76\")   Wt (!) 159 kg (350 lb)   SpO2 92%   BMI 42.60 kg/m²     "

## 2025-01-03 ENCOUNTER — TELEPHONE (OUTPATIENT)
Dept: CARDIOLOGY | Facility: CLINIC | Age: 60
End: 2025-01-03
Payer: COMMERCIAL

## 2025-01-03 NOTE — TELEPHONE ENCOUNTER
"I tried to call Marcus Simon' wife, Yadi (okay per VELASQUEZ) but there was no answer.  Left a voicemail asking her to call back.    HUB- if pt or Yadi calls back, please transfer through to triage.    I called and s/w pt, updating him that I was unsuccessful in reaching Yadi.  I asked him if he remembered the HR/BP readings.  Pt said, \"I'll have to double check, let me go in here and look and see... I'm going to have to dig through her stuff to try to find it.  Give me about 15 minutes I'll find it and call you back\".    Thank you,    Opal STILL RN  Triage [facility]  01/03/25 11:02 EST  "

## 2025-01-03 NOTE — TELEPHONE ENCOUNTER
"Pt called the office this morning wanting to verify that his medications are correct.  I pulled up his discharge summary which states on the first page to stop atenolol.  Pt says that he didn't stop this medicine but he will now.        Pt shares that he's been getting quite dyspneic on exertion, and it's affecting his job \"really bad\".  Pt went up 2 flights of stairs and was \"ready to pass out\" because he was so SOA.  I asked pt if he's been checking his BP/HR at home, which he has.  \"Everything seems to be okay\".  BP at home- pt says his wife has been writing this down; \"she's been keeping track of everything\".  He's not able to recall any numbers to provide at this time.    Pt will stop atenolol.  Additionally, pt needs his FU appt with CR scheduled.  Can someone please set this up for him?        Thank you,    Opal STILL RN  Triage [facility] [place]  01/03/25 10:40 EST      "

## 2025-01-03 NOTE — TELEPHONE ENCOUNTER
I have also called and left a voicemail for the patient about scheduling one month hospital follow up.

## 2025-01-10 DIAGNOSIS — I48.19 PERSISTENT ATRIAL FIBRILLATION: ICD-10-CM

## 2025-01-13 ENCOUNTER — TELEPHONE (OUTPATIENT)
Dept: CARDIOLOGY | Facility: CLINIC | Age: 60
End: 2025-01-13
Payer: COMMERCIAL

## 2025-01-13 RX ORDER — AMIODARONE HYDROCHLORIDE 200 MG/1
200 TABLET ORAL 2 TIMES DAILY
Qty: 180 TABLET | Refills: 1 | Status: SHIPPED | OUTPATIENT
Start: 2025-01-13

## 2025-01-13 NOTE — TELEPHONE ENCOUNTER
Caller: Marcus Simon    Relationship to patient: Self    Best call back number: [phone]     Patient is needing: PT IS RETURNING JORDAN'S CALL, DID NOT SEE ANYTHING IN CHART IN REGARDS TO ANYONE CALLING PT. PLS CALLBACK IF ANYONE TRIED REACHING OUT.

## 2025-01-14 NOTE — TELEPHONE ENCOUNTER
Spoke with patient.    I needed to speak with him about amio dose.    Also, got him scheduled for 1 month f/u after ablation.

## 2025-02-04 ENCOUNTER — OFFICE VISIT (OUTPATIENT)
Age: 60
End: 2025-02-04
Payer: COMMERCIAL

## 2025-02-04 VITALS
HEART RATE: 124 BPM | SYSTOLIC BLOOD PRESSURE: 128 MMHG | DIASTOLIC BLOOD PRESSURE: 86 MMHG | WEIGHT: 315 LBS | BODY MASS INDEX: 38.36 KG/M2 | HEIGHT: 76 IN

## 2025-02-04 DIAGNOSIS — I48.19 PERSISTENT ATRIAL FIBRILLATION: Primary | ICD-10-CM

## 2025-02-04 DIAGNOSIS — Z98.890 H/O CARDIAC RADIOFREQUENCY ABLATION: ICD-10-CM

## 2025-02-04 PROCEDURE — 99214 OFFICE O/P EST MOD 30 MIN: CPT

## 2025-02-04 PROCEDURE — 93000 ELECTROCARDIOGRAM COMPLETE: CPT

## 2025-02-04 RX ORDER — ATENOLOL 100 MG/1
100 TABLET ORAL 2 TIMES DAILY
Qty: 60 TABLET | Refills: 2 | Status: SHIPPED | OUTPATIENT
Start: 2025-02-04
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
## 2025-03-11 ENCOUNTER — HOSPITAL ENCOUNTER (OUTPATIENT)
Dept: CARDIOLOGY | Facility: HOSPITAL | Age: 60
Discharge: HOME | End: 2025-03-11
Payer: COMMERCIAL

## 2025-03-11 ENCOUNTER — OFFICE VISIT (OUTPATIENT)
Age: 60
End: 2025-03-11
Payer: COMMERCIAL

## 2025-03-11 VITALS
SYSTOLIC BLOOD PRESSURE: 132 MMHG | DIASTOLIC BLOOD PRESSURE: 80 MMHG | BODY MASS INDEX: 38.36 KG/M2 | HEIGHT: 76 IN | WEIGHT: 315 LBS | HEART RATE: 81 BPM

## 2025-03-11 VITALS
SYSTOLIC BLOOD PRESSURE: 128 MMHG | DIASTOLIC BLOOD PRESSURE: 80 MMHG | WEIGHT: 315 LBS | BODY MASS INDEX: 38.36 KG/M2 | HEIGHT: 76 IN | HEART RATE: 82 BPM

## 2025-03-11 DIAGNOSIS — Z98.890 H/O CARDIAC RADIOFREQUENCY ABLATION: Primary | ICD-10-CM

## 2025-03-11 DIAGNOSIS — I48.19 PERSISTENT ATRIAL FIBRILLATION: ICD-10-CM

## 2025-03-11 LAB
AORTIC ARCH: 2.7 CM
ASCENDING AORTA: 3.7 CM
AV MEAN PRESS GRAD SYS DOP V1V2: 2 MMHG
AV VMAX SYS DOP: 85.1 CM/SEC
BH CV ECHO MEAS - ACS: 2.31 CM
BH CV ECHO MEAS - AO MAX PG: 2.9 MMHG
BH CV ECHO MEAS - AO ROOT DIAM: 3.4 CM
BH CV ECHO MEAS - AO V2 VTI: 15.8 CM
BH CV ECHO MEAS - AVA(I,D): 3 CM2
BH CV ECHO MEAS - EDV(CUBED): 117.6 ML
BH CV ECHO MEAS - EDV(MOD-SP2): 216 ML
BH CV ECHO MEAS - EDV(MOD-SP4): 118 ML
BH CV ECHO MEAS - EF(MOD-SP2): 59.7 %
BH CV ECHO MEAS - EF(MOD-SP4): 60.2 %
BH CV ECHO MEAS - ESV(CUBED): 41.7 ML
BH CV ECHO MEAS - ESV(MOD-SP2): 87 ML
BH CV ECHO MEAS - ESV(MOD-SP4): 47 ML
BH CV ECHO MEAS - FS: 29.2 %
BH CV ECHO MEAS - IVS/LVPW: 1 CM
BH CV ECHO MEAS - IVSD: 1.4 CM
BH CV ECHO MEAS - LAT PEAK E' VEL: 11 CM/SEC
BH CV ECHO MEAS - LV DIASTOLIC VOL/BSA (35-75): 42.6 CM2
BH CV ECHO MEAS - LV MASS(C)D: 282.6 GRAMS
BH CV ECHO MEAS - LV MAX PG: 1.5 MMHG
BH CV ECHO MEAS - LV MEAN PG: 1 MMHG
BH CV ECHO MEAS - LV SYSTOLIC VOL/BSA (12-30): 17 CM2
BH CV ECHO MEAS - LV V1 MAX: 61.3 CM/SEC
BH CV ECHO MEAS - LV V1 VTI: 12.1 CM
BH CV ECHO MEAS - LVIDD: 4.9 CM
BH CV ECHO MEAS - LVIDS: 3.5 CM
BH CV ECHO MEAS - LVOT AREA: 3.9 CM2
BH CV ECHO MEAS - LVOT DIAM: 2.23 CM
BH CV ECHO MEAS - LVPWD: 1.4 CM
BH CV ECHO MEAS - MED PEAK E' VEL: 7.2 CM/SEC
BH CV ECHO MEAS - MR MAX PG: 98.1 MMHG
BH CV ECHO MEAS - MR MAX VEL: 495.2 CM/SEC
BH CV ECHO MEAS - MV DEC SLOPE: 361.5 CM/SEC2
BH CV ECHO MEAS - MV DEC TIME: 0.16 SEC
BH CV ECHO MEAS - MV E MAX VEL: 89.1 CM/SEC
BH CV ECHO MEAS - MV MAX PG: 3.2 MMHG
BH CV ECHO MEAS - MV MEAN PG: 1.25 MMHG
BH CV ECHO MEAS - MV P1/2T: 72.3 MSEC
BH CV ECHO MEAS - MV V2 VTI: 18.5 CM
BH CV ECHO MEAS - MVA(P1/2T): 3 CM2
BH CV ECHO MEAS - MVA(VTI): 2.6 CM2
BH CV ECHO MEAS - PA ACC TIME: 0.1 SEC
BH CV ECHO MEAS - PA V2 MAX: 50.4 CM/SEC
BH CV ECHO MEAS - QP/QS: 0.73
BH CV ECHO MEAS - RAP SYSTOLE: 15 MMHG
BH CV ECHO MEAS - RV MAX PG: 0.92 MMHG
BH CV ECHO MEAS - RV V1 MAX: 48 CM/SEC
BH CV ECHO MEAS - RV V1 VTI: 8.2 CM
BH CV ECHO MEAS - RVOT DIAM: 2.32 CM
BH CV ECHO MEAS - RVSP: 67 MMHG
BH CV ECHO MEAS - SUP REN AO DIAM: 2.6 CM
BH CV ECHO MEAS - SV(LVOT): 47.4 ML
BH CV ECHO MEAS - SV(MOD-SP2): 129 ML
BH CV ECHO MEAS - SV(MOD-SP4): 71 ML
BH CV ECHO MEAS - SV(RVOT): 34.6 ML
BH CV ECHO MEAS - SVI(LVOT): 17.1 ML/M2
BH CV ECHO MEAS - SVI(MOD-SP2): 46.6 ML/M2
BH CV ECHO MEAS - SVI(MOD-SP4): 25.6 ML/M2
BH CV ECHO MEAS - TAPSE (>1.6): 1.22 CM
BH CV ECHO MEAS - TR MAX PG: 52.4 MMHG
BH CV ECHO MEAS - TR MAX VEL: 362 CM/SEC
BH CV ECHO MEASUREMENTS AVERAGE E/E' RATIO: 9.79
BH CV XLRA - RV BASE: 4.8 CM
BH CV XLRA - RV LENGTH: 7.6 CM
BH CV XLRA - RV MID: 4 CM
BH CV XLRA - TDI S': 7.5 CM/SEC
LEFT ATRIUM VOLUME INDEX: 35.2 ML/M2
LV EF BIPLANE MOD: 58.2 %
SINUS: 3.6 CM
STJ: 3.1 CM

## 2025-03-11 PROCEDURE — 25510000001 PERFLUTREN 6.52 MG/ML SUSPENSION 2 ML VIAL

## 2025-03-11 PROCEDURE — 93306 TTE W/DOPPLER COMPLETE: CPT

## 2025-03-11 RX ORDER — ALBUTEROL SULFATE 90 UG/1
INHALANT RESPIRATORY (INHALATION)
COMMUNITY
Start: 2025-01-07

## 2025-03-11 RX ORDER — ALBUTEROL SULFATE 90 UG/1
2 INHALANT RESPIRATORY (INHALATION)
COMMUNITY
Start: 2024-10-15

## 2025-03-11 RX ADMIN — PERFLUTREN 1.5 ML: 6.52 INJECTION, SUSPENSION INTRAVENOUS at 12:44
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
## 2025-05-04 DIAGNOSIS — I48.19 PERSISTENT ATRIAL FIBRILLATION: ICD-10-CM

## 2025-05-05 RX ORDER — ATENOLOL 100 MG/1
100 TABLET ORAL 2 TIMES DAILY
Qty: 180 TABLET | Refills: 1 | Status: SHIPPED | OUTPATIENT
Start: 2025-05-05

## 2025-07-07 ENCOUNTER — TELEPHONE (OUTPATIENT)
Dept: CARDIOLOGY | Age: 60
End: 2025-07-07

## (undated) DEVICE — OCTA,PERSEID,2-2-2-2-2,F-CURVE: Brand: OCTARAY MAPPING CATHETER

## (undated) DEVICE — SOUNDSTAR ECO 8F G CATHETER: Brand: SOUNDSTAR

## (undated) DEVICE — ENDOPATH XCEL BLADELESS TROCARS WITH STABILITY SLEEVES: Brand: ENDOPATH XCEL

## (undated) DEVICE — SHEATH GUIDE EP CARTO VIZIGO BIDIR 8.5F MD/CRV/22MM 71CM IDE

## (undated) DEVICE — IRRIGATOR BULB ASEPTO 60CC STRL

## (undated) DEVICE — GLV SURG BIOGEL LTX PF 7

## (undated) DEVICE — Device: Brand: REFERENCE PATCH CARTO 3

## (undated) DEVICE — GLV SURG BIOGEL LTX PF 6

## (undated) DEVICE — CATH DIAG IMPULSE FL3.5 5F 100CM

## (undated) DEVICE — CLEARSIGHT FINGER CUFF LARGE MULTI PACK: Brand: CLEARSIGHT

## (undated) DEVICE — SYS CLS VASC/VENI VASCADE MVP 6TO12F

## (undated) DEVICE — CATH DIAG IMPULSE FR4 5F 100CM

## (undated) DEVICE — GLIDESHEATH SLENDER STAINLESS STEEL KIT: Brand: GLIDESHEATH SLENDER

## (undated) DEVICE — TUBING, SUCTION, 1/4" X 20', STRAIGHT: Brand: MEDLINE INDUSTRIES, INC.

## (undated) DEVICE — ENSEAL TRIO TEMPERATURE CONTOLLED TISSUE SEALING TECHNOLOGY DISPOSABLE TISSUE SEALING DEVICE TAPTRONIC TRIGGER ACTIVATED POWER 3MM CURVED JAW: Brand: ENSEAL

## (undated) DEVICE — PINNACLE INTRODUCER SHEATH: Brand: PINNACLE

## (undated) DEVICE — 24 FR STRAIGHT – SOFT PVC CATHETER: Brand: PVC THORACIC CATHETERS

## (undated) DEVICE — EXTENSION SET, MALE LUER LOCK ADAPTER WITH RETRACTABLE COLLAR

## (undated) DEVICE — 3M™ IOBAN™ 2 ANTIMICROBIAL INCISE DRAPE 6650EZ: Brand: IOBAN™ 2

## (undated) DEVICE — PERCLOSE™ PROSTYLE™ SUTURE-MEDIATED CLOSURE AND REPAIR SYSTEM: Brand: PERCLOSE™ PROSTYLE™

## (undated) DEVICE — GW EMR FIX EXCHG J STD .035 3MM 260CM

## (undated) DEVICE — BI-DIRECTIONAL NAVIGATION CATHETER, NAV, D-F: Brand: QDOT MICRO

## (undated) DEVICE — APPL CHLORAPREP HI/LITE 26ML ORNG

## (undated) DEVICE — DEV COAG EPISENSE GUIDED 6130 W/VISITRAX 3CM SGL

## (undated) DEVICE — Device: Brand: SMARTABLATE

## (undated) DEVICE — ELECTRD BLD EZ CLN STD 6.5IN

## (undated) DEVICE — MEDI-VAC YANKAUER SUCTION HANDLE W/BULBOUS TIP: Brand: CARDINAL HEALTH

## (undated) DEVICE — GUIDE SELECT ATRICLIP

## (undated) DEVICE — BLD SAW STERN L 32.0MM H 6.0MM

## (undated) DEVICE — Device: Brand: WEBSTER CS

## (undated) DEVICE — PENCL ES MEGADINE EZ/CLEAN BUTN W/HOLSTR 10FT

## (undated) DEVICE — ENDOCUT SCISSOR TIP, DISPOSABLE: Brand: RENEW

## (undated) DEVICE — LAPAROSCOPIC DISSECTOR: Brand: DEROYAL

## (undated) DEVICE — SET PRIMARY GRVTY 10DP MALE LL 104IN

## (undated) DEVICE — 1000ML,PRESSURE INFUSER W/STOPCOCK: Brand: MEDLINE

## (undated) DEVICE — SOL ISO/ALC 70PCT 4OZ

## (undated) DEVICE — TB PROSHIELD PROTECT PLSTC CLR

## (undated) DEVICE — CATH BRONCHO 39F LT

## (undated) DEVICE — LAPAROVUE VISIBILITY SYSTEM LAPAROSCOPIC SOLUTIONS: Brand: LAPAROVUE

## (undated) DEVICE — SKIN PREP TRAY W/CHG: Brand: MEDLINE INDUSTRIES, INC.

## (undated) DEVICE — DRSNG WND GZ PAD BORDERED 4X8IN STRL

## (undated) DEVICE — ESOPHAGEAL STETHOSCOPE W/TEMPERATURE SENSOR: Brand: DEROYAL

## (undated) DEVICE — GW AMPLATZ  XSTIFF CVD .032 3MM 180CM

## (undated) DEVICE — 1 X VERSACROSS TRANSSEPTAL SHEATH (INCLUDING  1 X J-TIP GUIDEWIRE); 1 X VERSACROSS RF WIRE (INCLUDING 1 X CONNECTOR CABLE (SINGLE USE)); 1 X DISPERSIVE ELECTRODE: Brand: VERSACROSS ACCESS SOLUTION

## (undated) DEVICE — LOU EP: Brand: MEDLINE INDUSTRIES, INC.

## (undated) DEVICE — TTL1LYR 16FR10ML 100%SIL TMPST TR: Brand: MEDLINE

## (undated) DEVICE — 3M™ IOBAN™ 2 ANTIMICROBIAL INCISE DRAPE 6651EZ: Brand: IOBAN™ 2

## (undated) DEVICE — PK CATH CARD 40

## (undated) DEVICE — DRP INCISE CARDIO W/ADHS 115X85X151IN LG STRL

## (undated) DEVICE — DRAPE,REIN 53X77,STERILE: Brand: MEDLINE

## (undated) DEVICE — SHEATH STEER ABLAT PULSESELECT/PFA FLEXCATH/CONTOUR 10F 13MM

## (undated) DEVICE — Device

## (undated) DEVICE — ELECTRD DEFIB M/FUNC STATPADZ PK/2

## (undated) DEVICE — OASIS DRAIN, SINGLE, INLINE & ATS COMPATIBLE: Brand: OASIS

## (undated) DEVICE — CATH ABLAT PULSESELECT/PFA OTW 9/ELECTRD 145CM

## (undated) DEVICE — GLV SURG BIOGEL LTX PF 7 1/2

## (undated) DEVICE — CVR PROB 96IN LF STRL

## (undated) DEVICE — LOU LAP CHOLE: Brand: MEDLINE INDUSTRIES, INC.

## (undated) DEVICE — KT MANIFLD CARDIAC

## (undated) DEVICE — SPNG GZ WOVN 4X4IN 12PLY 10/BX STRL

## (undated) DEVICE — INTENDED FOR TISSUE SEPARATION, AND OTHER PROCEDURES THAT REQUIRE A SHARP SURGICAL BLADE TO PUNCTURE OR CUT.: Brand: BARD-PARKER ® CARBON RIB-BACK BLADES

## (undated) DEVICE — TOWEL,OR,DSP,ST,BLUE,STD,4/PK,20PK/CS: Brand: MEDLINE

## (undated) DEVICE — SUT SILK 0 CT1 CR8 18IN C021D

## (undated) DEVICE — SENSR CERBRL O2 PK/2

## (undated) DEVICE — SUT PDS 0 CT1 36IN Z346H

## (undated) DEVICE — ADHS SKIN SURG TISS VISC PREMIERPRO EXOFIN HI/VISC FAST/DRY